# Patient Record
Sex: FEMALE | Race: WHITE | NOT HISPANIC OR LATINO | Employment: UNEMPLOYED | URBAN - NONMETROPOLITAN AREA
[De-identification: names, ages, dates, MRNs, and addresses within clinical notes are randomized per-mention and may not be internally consistent; named-entity substitution may affect disease eponyms.]

---

## 2017-01-10 ENCOUNTER — OFFICE VISIT (OUTPATIENT)
Dept: OBSTETRICS AND GYNECOLOGY | Facility: CLINIC | Age: 27
End: 2017-01-10

## 2017-01-10 VITALS
SYSTOLIC BLOOD PRESSURE: 124 MMHG | HEIGHT: 65 IN | DIASTOLIC BLOOD PRESSURE: 82 MMHG | WEIGHT: 249 LBS | BODY MASS INDEX: 41.48 KG/M2

## 2017-01-10 DIAGNOSIS — N91.1 AMENORRHEA, SECONDARY: Primary | ICD-10-CM

## 2017-01-10 PROCEDURE — 99202 OFFICE O/P NEW SF 15 MIN: CPT | Performed by: OBSTETRICS & GYNECOLOGY

## 2017-01-10 RX ORDER — MEDROXYPROGESTERONE ACETATE 10 MG/1
10 TABLET ORAL DAILY
Qty: 10 TABLET | Refills: 0 | Status: SHIPPED | OUTPATIENT
Start: 2017-01-10 | End: 2017-02-14 | Stop reason: SDUPTHER

## 2017-01-10 NOTE — LETTER
January 10, 2017     Patient: Jerrica Marquez   YOB: 1990   Date of Visit: 1/10/2017       To Whom It May Concern:    It is my medical opinion that Jerrica Marquez may return to work on 1/11/2017.           Sincerely,        Jayne Thibodeaux MD

## 2017-01-10 NOTE — PROGRESS NOTES
"Subjective     Chief Complaint   Patient presents with   • Gynecologic Exam     irregular bleeding       Jerrica Marquez is a 26 y.o. G0 presents today for irregular bleeding and desire for fertility.  Patient states she has always had irregular periods and at times goes several months without one.  She states that she went from May until November without a period, followed by a heavy period in the month of November.  She states she was seen in the ER for bleeding and was started on Provera that stopped her bleeding.  Patient states that she was told \"she might have PCOS.\"  Never been pregnant but has been trying for almost a full year without success.     No history of abnl paps or STDs.   has never fathered a child.      Past Medical History   Diagnosis Date   • Abdominal pain      generalized cramping after eating wheat type products   • Acquired hypothyroidism      Secondary to Hashimoto's thyroiditis   • Dysmenorrhea    • Endogenous obesity    • Excessive or frequent menstruation    • Hashimoto's thyroiditis    • Headache    • Myopia    • Vitamin D deficiency      Past Surgical History   Procedure Laterality Date   • Injection of medication  08/14/2013     Depo Provera (medroxyprogesterone acetate)   • Injection of medication  03/13/2012     Kenalog x2   • Injection of medication  09/17/2015     Toradol   • Injection of medication  09/17/2015     Zofran     Social History     Social History   • Marital status: Single     Spouse name: N/A   • Number of children: N/A   • Years of education: N/A     Occupational History   • Not on file.     Social History Main Topics   • Smoking status: Never Smoker   • Smokeless tobacco: Never Used   • Alcohol use No   • Drug use: No   • Sexual activity: Yes     Partners: Male     Other Topics Concern   • Not on file     Social History Narrative     Family History   Problem Relation Age of Onset   • Asthma Other    • Hypertension Other    • Other Other      Thyroid " "Disorder, Depressive Disorder, Smoking Tobacco, Anxiety   • Rheum arthritis Mother    • Hypertension Father    • Heart disease Maternal Grandfather    • Hypertension Maternal Grandfather        Review of Systems - comprehensive ROS preformed and all negative except for mood swings, cold intolerance, heat intolerance.     Objective   Visit Vitals   • /82 (BP Location: Left arm, Patient Position: Sitting, Cuff Size: Adult)   • Ht 65\" (165.1 cm)   • Wt 249 lb (113 kg)   • BMI 41.44 kg/m2       Physical Exam  General:   Appears stated age, AAOx3, NAD   Heart: RRR no m/r/g   Lungs: CTAB   Abdomen: Soft, nttp, no masses palpated   Extremities: No CT or edema bilaterally    Neurologic: CN II - XII grossly intact     TVUS (12/16):  The uterus is normal in position, shape and size.  The uterus measures 8.67 cm in length by 4.24 cm AP by 5.35 cm  transverse  Endometrium thickened to 18.6 mm.      Both ovaries are visualized and are unremarkable.  Doppler and color Doppler demonstrate flow in each ovary  No free fluid.    Assessment/Plan     ASSESSMENT  1. Amenorrhea, secondary        PLAN  1.  Amenorrhea  - Discussed that this was most likely PCOS   - TVUS results reviewed and discussed with patient  - Encourage patient to follow with endocrine for latest TSH level.   - Discussed that given the desire for fertility will do Day#3 labs following a provera withdrawal bleed  - Encourage patient about weight loss, discussed that even a loss of 10% of current weight can increase fecundity   - RTC in 4 weeks.      Orders Placed This Encounter   Procedures   • Follicle Stimulating Hormone   • Luteinizing Hormone   • Estradiol   • Prolactin           New Medications Ordered This Visit   Medications   • medroxyPROGESTERone (PROVERA) 10 MG tablet     Sig: Take 1 tablet by mouth Daily.     Dispense:  10 tablet     Refill:  0       Jayne BYRNE Friday, MD  1/10/2017           "

## 2017-01-10 NOTE — MR AVS SNAPSHOT
[facility]  [phone]                    Jerrica Marquez   1/10/2017 9:00 AM   Office Visit    Dept Phone:  [phone]   Encounter #:  [id number]    Provider:  Jayne Thibodeaux MD   Department:  [facility]                Your Full Care Plan              Today's Medication Changes          These changes are accurate as of: 1/10/17  9:41 AM.  If you have any questions, ask your nurse or doctor.               Stop taking medication(s)listed here:     amoxicillin 500 MG capsule   Commonly known as:  AMOXIL                Where to Get Your Medications      These medications were sent to [facility]/pharmacy #[id number] - [place], [place] - [address] AT [address]AR [facility] - [phone]  - [phone]   [address], [facility] [address]     Phone:  [phone]     medroxyPROGESTERone 10 MG tablet                  Your Updated Medication List          This list is accurate as of: 1/10/17  9:41 AM.  Always use your most recent med list.                levothyroxine 125 MCG tablet   Commonly known as:  SYNTHROID, LEVOTHROID   Take 1 tablet by mouth daily.       medroxyPROGESTERone 10 MG tablet   Commonly known as:  PROVERA   Take 1 tablet by mouth Daily.       norethindrone-ethinyl estradiol 0.4-35 MG-MCG per tablet   Commonly known as:  OVCON   Take 3 tabs po for three days, then 2 tabs po for 3 days then 1 daily       PRENATAL VITAMIN PO               You Were Diagnosed With        Codes Comments    Amenorrhea, secondary    -  Primary ICD-10-CM: N91.1  ICD-9-CM: 626.0       Instructions     None    Patient Instructions History      Upcoming Appointments     Visit Type Date Time Department    NEW GYNECOLOGICAL 1/10/2017  9:00 AM [facility]    OFFICE VISIT 1/16/2017 10:45 AM [facility] 4TH    FOLLOW UP 2/8/2017  9:00 AM [facility]    OFFICE VISIT 2/14/2017  9:45 AM [facility]      MyChart Signup     Our records indicate that you  "have an active [facility] [employer] account.    You can view your After Visit Summary by going to [employer] and logging in with your [employer] username and password.  If you don't have a [employer] username and password but a parent or guardian has access to your record, the parent or guardian should login with their own [employer] username and password and access your record to view the After Visit Summary.    If you have questions, you can email [email or web address] or call [phone] to talk to our [employer] staff.  Remember, [employer] is NOT to be used for urgent needs.  For medical emergencies, dial 911.               Other Info from Your Visit           Your Appointments     Jan 16, 2017 10:45 AM CST   Office Visit with Leann Duff MD   [facility] (--)    [address]  [address] [facility] [address] [phone]           Arrive 15 minutes prior to appointment.            Feb 08, 2017  9:00 AM CST   Follow Up with REBECA Blancas   [facility] ENDOCRINOLOGY (--)    [address]  [address] [facility] [address] [phone]           Arrive 15 minutes prior to appointment.            Feb 14, 2017  9:45 AM CST   Office Visit with Jayne Thibodeaux MD   [facility] (--)    [address] [place] [address] [address]  [address] [facility] [address] [phone]           Arrive 15 minutes prior to appointment.              Allergies     Cefzil [Cefprozil]  Hives    Celexa [Citalopram]      Patient states that she is not allergic to this med. It causes fatigue      Reason for Visit     Gynecologic Exam irregular bleeding      Vital Signs     Blood Pressure Height Weight Body Mass Index Smoking Status       124/82 (BP Location: Left arm, Patient Position: Sitting, Cuff Size: Adult) 65\" (165.1 cm) 249 lb (113 kg) 41.44 kg/m2 Never Smoker       Problems and Diagnoses Noted  "    Amenorrhea, secondary    -  Primary

## 2017-02-03 ENCOUNTER — LAB (OUTPATIENT)
Dept: LAB | Facility: HOSPITAL | Age: 27
End: 2017-02-03

## 2017-02-03 DIAGNOSIS — N91.1 AMENORRHEA, SECONDARY: ICD-10-CM

## 2017-02-03 LAB
FSH SERPL-ACNC: 3 MIU/ML
LH SERPL-ACNC: 0.73 MIU/ML

## 2017-02-03 PROCEDURE — 83001 ASSAY OF GONADOTROPIN (FSH): CPT | Performed by: OBSTETRICS & GYNECOLOGY

## 2017-02-03 PROCEDURE — 84146 ASSAY OF PROLACTIN: CPT | Performed by: OBSTETRICS & GYNECOLOGY

## 2017-02-03 PROCEDURE — 82670 ASSAY OF TOTAL ESTRADIOL: CPT | Performed by: OBSTETRICS & GYNECOLOGY

## 2017-02-03 PROCEDURE — 36415 COLL VENOUS BLD VENIPUNCTURE: CPT

## 2017-02-03 PROCEDURE — 83002 ASSAY OF GONADOTROPIN (LH): CPT | Performed by: OBSTETRICS & GYNECOLOGY

## 2017-02-04 LAB
ESTRADIOL SERPL HS-MCNC: 25.7 PG/ML
PROLACTIN SERPL-MCNC: 16.2 NG/ML (ref 4.8–23.3)

## 2017-02-14 ENCOUNTER — OFFICE VISIT (OUTPATIENT)
Dept: OBSTETRICS AND GYNECOLOGY | Facility: CLINIC | Age: 27
End: 2017-02-14

## 2017-02-14 VITALS
SYSTOLIC BLOOD PRESSURE: 114 MMHG | BODY MASS INDEX: 41.32 KG/M2 | HEIGHT: 65 IN | DIASTOLIC BLOOD PRESSURE: 77 MMHG | WEIGHT: 248 LBS

## 2017-02-14 DIAGNOSIS — N97.9 INFERTILITY, FEMALE: Primary | ICD-10-CM

## 2017-02-14 PROCEDURE — 99212 OFFICE O/P EST SF 10 MIN: CPT | Performed by: OBSTETRICS & GYNECOLOGY

## 2017-02-14 RX ORDER — MEDROXYPROGESTERONE ACETATE 10 MG/1
10 TABLET ORAL DAILY
Qty: 10 TABLET | Refills: 0 | Status: SHIPPED | OUTPATIENT
Start: 2017-02-14 | End: 2017-05-26

## 2017-02-15 NOTE — PROGRESS NOTES
"Subjective     Chief Complaint   Patient presents with   • Follow-up       Jerrica Marquez is a 26 y.o. G0 presents today for follow-up.  Patient was seen on 1/10 with complaints of infertility, amenorrhea/irregular bleeding.  Patient has been trying for a full year with no success in conceiving.  Had a period on 1/19, states it was light and lasted 5 days.  This period was following a provera withdrawal bleed.      No changes to PMH, family or social history since last visit.  No new medications or allergies. Had wisdom teeth out on 1/18.    Objective   Visit Vitals   • /77   • Ht 65\" (165.1 cm)   • Wt 248 lb (112 kg)   • BMI 41.27 kg/m2       Physical Exam  General:   Appears stated age, AAOx3, NAD   Neurologic: CN II - XII grossly intact       Assessment/Plan     ASSESSMENT  1. Infertility, female        PLAN  1.  Infertility  - Given the diagnosis of anovulatory bleeding/PCOS and desire to conceive discuss the use of Clomid and potential benefits.  Patient interested  - Discussed risk of mood swings, hot flashes, and potential for multiples with the use of Clomid.  Patient verbalized understanding  - Discussed doing withdrawal bleed with Provera, followed by Clomid on Day 5-9, with progesterone level on Day 21.  - Discussed doing only 2 rounds at each dose up to a max dose of 150mg.   - Will have patient follow up after first round to evaluate how patient tolerated  - Asked to scheduled follow up with Endocrine at the end of the month    Orders Placed This Encounter   Procedures   • Progesterone           New Medications Ordered This Visit   Medications   • medroxyPROGESTERone (PROVERA) 10 MG tablet     Sig: Take 1 tablet by mouth Daily.     Dispense:  10 tablet     Refill:  0   • clomiPHENE (CLOMID) 50 MG tablet     Sig: Take 1 tablet by mouth Daily. Please take on Day 5-9 of your cycle     Dispense:  5 tablet     Refill:  1       Jayne Thibodeaux MD  2/14/2017           "

## 2017-02-17 DIAGNOSIS — E06.3 HASHIMOTO'S THYROIDITIS: Primary | ICD-10-CM

## 2017-02-21 ENCOUNTER — OFFICE VISIT (OUTPATIENT)
Dept: ENDOCRINOLOGY | Facility: CLINIC | Age: 27
End: 2017-02-21

## 2017-02-21 ENCOUNTER — LAB (OUTPATIENT)
Dept: LAB | Facility: HOSPITAL | Age: 27
End: 2017-02-21

## 2017-02-21 VITALS — HEART RATE: 99 BPM | HEIGHT: 65 IN | SYSTOLIC BLOOD PRESSURE: 126 MMHG | DIASTOLIC BLOOD PRESSURE: 80 MMHG

## 2017-02-21 DIAGNOSIS — E06.3 HASHIMOTO'S THYROIDITIS: Primary | ICD-10-CM

## 2017-02-21 DIAGNOSIS — E55.9 VITAMIN D DEFICIENCY: ICD-10-CM

## 2017-02-21 DIAGNOSIS — N97.9 INFERTILITY, FEMALE: ICD-10-CM

## 2017-02-21 LAB
ALBUMIN SERPL-MCNC: 4.1 G/DL (ref 3.4–4.8)
ALBUMIN/GLOB SERPL: 1.3 G/DL (ref 1.1–1.8)
ALP SERPL-CCNC: 85 U/L (ref 38–126)
ALT SERPL W P-5'-P-CCNC: 29 U/L (ref 9–52)
ANION GAP SERPL CALCULATED.3IONS-SCNC: 11 MMOL/L (ref 5–15)
AST SERPL-CCNC: 33 U/L (ref 14–36)
BASOPHILS # BLD AUTO: 0.01 10*3/MM3 (ref 0–0.2)
BASOPHILS NFR BLD AUTO: 0.2 % (ref 0–2)
BILIRUB SERPL-MCNC: 0.3 MG/DL (ref 0.2–1.3)
BUN BLD-MCNC: 9 MG/DL (ref 7–21)
BUN/CREAT SERPL: 12 (ref 7–25)
CALCIUM SPEC-SCNC: 9 MG/DL (ref 8.4–10.2)
CHLORIDE SERPL-SCNC: 106 MMOL/L (ref 95–110)
CO2 SERPL-SCNC: 24 MMOL/L (ref 22–31)
CREAT BLD-MCNC: 0.75 MG/DL (ref 0.5–1)
DEPRECATED RDW RBC AUTO: 40.9 FL (ref 36.4–46.3)
EOSINOPHIL # BLD AUTO: 0.09 10*3/MM3 (ref 0–0.7)
EOSINOPHIL NFR BLD AUTO: 1.7 % (ref 0–7)
ERYTHROCYTE [DISTWIDTH] IN BLOOD BY AUTOMATED COUNT: 14.5 % (ref 11.5–14.5)
GFR SERPL CREATININE-BSD FRML MDRD: 94 ML/MIN/1.73 (ref 71–165)
GLOBULIN UR ELPH-MCNC: 3.2 GM/DL (ref 2.3–3.5)
GLUCOSE BLD-MCNC: 134 MG/DL (ref 60–100)
HCT VFR BLD AUTO: 32.9 % (ref 35–45)
HGB BLD-MCNC: 10.5 G/DL (ref 12–15.5)
IMM GRANULOCYTES # BLD: 0.01 10*3/MM3 (ref 0–0.02)
IMM GRANULOCYTES NFR BLD: 0.2 % (ref 0–0.5)
LYMPHOCYTES # BLD AUTO: 1.5 10*3/MM3 (ref 0.6–4.2)
LYMPHOCYTES NFR BLD AUTO: 27.9 % (ref 10–50)
MCH RBC QN AUTO: 24.8 PG (ref 26.5–34)
MCHC RBC AUTO-ENTMCNC: 31.9 G/DL (ref 31.4–36)
MCV RBC AUTO: 77.6 FL (ref 80–98)
MONOCYTES # BLD AUTO: 0.39 10*3/MM3 (ref 0–0.9)
MONOCYTES NFR BLD AUTO: 7.3 % (ref 0–12)
NEUTROPHILS # BLD AUTO: 3.37 10*3/MM3 (ref 2–8.6)
NEUTROPHILS NFR BLD AUTO: 62.7 % (ref 37–80)
PLATELET # BLD AUTO: 329 10*3/MM3 (ref 150–450)
PMV BLD AUTO: 9.5 FL (ref 8–12)
POTASSIUM BLD-SCNC: 4.1 MMOL/L (ref 3.5–5.1)
PROT SERPL-MCNC: 7.3 G/DL (ref 6.3–8.6)
RBC # BLD AUTO: 4.24 10*6/MM3 (ref 3.77–5.16)
SODIUM BLD-SCNC: 141 MMOL/L (ref 137–145)
TSH SERPL DL<=0.05 MIU/L-ACNC: 3.96 MIU/ML (ref 0.46–4.68)
WBC NRBC COR # BLD: 5.37 10*3/MM3 (ref 3.2–9.8)

## 2017-02-21 PROCEDURE — 99213 OFFICE O/P EST LOW 20 MIN: CPT | Performed by: NURSE PRACTITIONER

## 2017-02-21 PROCEDURE — 84443 ASSAY THYROID STIM HORMONE: CPT | Performed by: NURSE PRACTITIONER

## 2017-02-21 PROCEDURE — 80053 COMPREHEN METABOLIC PANEL: CPT | Performed by: NURSE PRACTITIONER

## 2017-02-21 PROCEDURE — 85025 COMPLETE CBC W/AUTO DIFF WBC: CPT | Performed by: NURSE PRACTITIONER

## 2017-02-21 PROCEDURE — 36415 COLL VENOUS BLD VENIPUNCTURE: CPT | Performed by: NURSE PRACTITIONER

## 2017-02-21 PROCEDURE — 84144 ASSAY OF PROGESTERONE: CPT | Performed by: OBSTETRICS & GYNECOLOGY

## 2017-02-21 RX ORDER — LEVOTHYROXINE SODIUM 125 MCG
125 TABLET ORAL DAILY
Qty: 30 TABLET | Refills: 11 | Status: SHIPPED | OUTPATIENT
Start: 2017-02-21 | End: 2017-05-26

## 2017-02-21 NOTE — PROGRESS NOTES
Subjective    Jerrica Marquez is a 26 y.o. female. she is here today for follow-up.    History of Present Illness         26 year old comes for follow up      reason - hypothyroidism,due to Hashimoto's     timing - constant     quality - not controlled     severity - moderate     symptoms - 60 + lb weight gain, fatigue, hair loss, constipation, arthralgias     alleviating - levothyroxine 100 mcgs daily and on this dose TSH is 5    Currently trying to become pregnant     --     obesity   despite exercise  following 1800 calorie diet     --     h o vit D Deficiency, not on supplements       Evaluation history:  TSH   Date Value Ref Range Status   12/16/2016 6.41 (H) 0.46 - 4.68 uIU/ml Final     FREE T4   Date Value Ref Range Status   12/16/2016 0.84 0.78 - 2.19 ng/dl Final       Current medications:  Current Outpatient Prescriptions   Medication Sig Dispense Refill   • clomiPHENE (CLOMID) 50 MG tablet Take 1 tablet by mouth Daily. Please take on Day 5-9 of your cycle 5 tablet 1   • levothyroxine (SYNTHROID, LEVOTHROID) 125 MCG tablet Take 1 tablet by mouth daily. 30 tablet 11   • medroxyPROGESTERone (PROVERA) 10 MG tablet Take 1 tablet by mouth Daily. 10 tablet 0   • Prenatal Vit-Fe Fumarate-FA (PRENATAL VITAMIN PO) Take  by mouth Daily.       No current facility-administered medications for this visit.        The following portions of the patient's history were reviewed and updated as appropriate:   Past Medical History   Diagnosis Date   • Abdominal pain      generalized cramping after eating wheat type products   • Acquired hypothyroidism      Secondary to Hashimoto's thyroiditis   • Dysmenorrhea    • Endogenous obesity    • Excessive or frequent menstruation    • Hashimoto's thyroiditis    • Headache    • Myopia    • Vitamin D deficiency      Past Surgical History   Procedure Laterality Date   • Injection of medication  08/14/2013     Depo Provera (medroxyprogesterone acetate)   • Injection of medication   03/13/2012     Kenalog x2   • Injection of medication  09/17/2015     Toradol   • Injection of medication  09/17/2015     Zofran     Family History   Problem Relation Age of Onset   • Asthma Other    • Hypertension Other    • Other Other      Thyroid Disorder, Depressive Disorder, Smoking Tobacco, Anxiety   • Rheum arthritis Mother    • Hypertension Father    • Heart disease Maternal Grandfather    • Hypertension Maternal Grandfather      OB History     No data available        Allergies   Allergen Reactions   • Cefzil [Cefprozil] Hives   • Celexa [Citalopram]      Patient states that she is not allergic to this med. It causes fatigue     Social History     Social History   • Marital status: Single     Spouse name: N/A   • Number of children: N/A   • Years of education: N/A     Social History Main Topics   • Smoking status: Never Smoker   • Smokeless tobacco: Never Used   • Alcohol use No   • Drug use: No   • Sexual activity: Yes     Partners: Male     Other Topics Concern   • None     Social History Narrative       Review of Systems  Review of Systems   Constitutional: Negative for activity change, appetite change, chills, diaphoresis and fatigue.   HENT: Negative for congestion, dental problem, drooling, ear discharge, ear pain, facial swelling, sneezing, sore throat, tinnitus, trouble swallowing and voice change.    Eyes: Negative for photophobia, pain, discharge, redness, itching and visual disturbance.   Respiratory: Negative for apnea, cough, choking, chest tightness and shortness of breath.    Cardiovascular: Negative for chest pain, palpitations and leg swelling.   Gastrointestinal: Negative for abdominal distention, abdominal pain, constipation, diarrhea, nausea and vomiting.   Endocrine: Negative for cold intolerance, heat intolerance, polydipsia, polyphagia and polyuria.   Genitourinary: Negative for difficulty urinating, dysuria, frequency, hematuria and urgency.   Musculoskeletal: Negative for  "arthralgias, back pain, gait problem, joint swelling, myalgias, neck pain and neck stiffness.   Skin: Negative for color change, pallor, rash and wound.   Allergic/Immunologic: Negative for environmental allergies, food allergies and immunocompromised state.   Neurological: Negative for dizziness, tremors, facial asymmetry, weakness, light-headedness, numbness and headaches.   Hematological: Negative for adenopathy. Does not bruise/bleed easily.   Psychiatric/Behavioral: Negative for agitation, behavioral problems, confusion, decreased concentration, dysphoric mood and sleep disturbance.        Objective      Visit Vitals   • /80 (BP Location: Right arm, Patient Position: Sitting, Cuff Size: Adult)   • Pulse 99   • Ht 65\" (165.1 cm)     Physical Exam   Constitutional: She is oriented to person, place, and time. She appears well-developed and well-nourished. No distress.   HENT:   Head: Normocephalic and atraumatic.   Right Ear: External ear normal.   Left Ear: External ear normal.   Nose: Nose normal.   Eyes: Conjunctivae and EOM are normal. Pupils are equal, round, and reactive to light.   Neck: Normal range of motion. Neck supple. No tracheal deviation present. No thyromegaly present.   Cardiovascular: Normal rate, regular rhythm and normal heart sounds.    No murmur heard.  Pulmonary/Chest: Effort normal and breath sounds normal. No respiratory distress. She has no wheezes.   Abdominal: Soft. Bowel sounds are normal. There is no tenderness. There is no rebound and no guarding.   Musculoskeletal: Normal range of motion. She exhibits no edema, tenderness or deformity.   Neurological: She is alert and oriented to person, place, and time. No cranial nerve deficit.   Skin: Skin is warm and dry. No rash noted.   Psychiatric: She has a normal mood and affect. Her behavior is normal. Judgment and thought content normal.       Lab Review  Lab Results   Component Value Date    TSH 6.41 (H) 12/16/2016     Lab Results "   Component Value Date    FREET4 0.84 12/16/2016        Assessment/Plan      1. Hashimoto's thyroiditis    2. Vitamin D deficiency    . This diagnosis was discussed and reviewed with the patient including the advantages of drug therapy.     1. Orders placed during this encounter include:  No orders of the defined types were placed in this encounter.      Medications prescribed:  Outpatient Encounter Prescriptions as of 2/21/2017   Medication Sig Dispense Refill   • clomiPHENE (CLOMID) 50 MG tablet Take 1 tablet by mouth Daily. Please take on Day 5-9 of your cycle 5 tablet 1   • levothyroxine (SYNTHROID, LEVOTHROID) 125 MCG tablet Take 1 tablet by mouth daily. 30 tablet 11   • medroxyPROGESTERone (PROVERA) 10 MG tablet Take 1 tablet by mouth Daily. 10 tablet 0   • Prenatal Vit-Fe Fumarate-FA (PRENATAL VITAMIN PO) Take  by mouth Daily.       No facility-administered encounter medications on file as of 2/21/2017.            Plan Details  Hypothyroidism        on Levothyroxine 125 mcgs one daily     June 2015     TSH - 2.07  no change in dosage     ( previouly on levothyroxine and cytomel did not like the cytomel)     repeat labs today     Will call with results       Variability TSH -- submit for brand name synthroid     When she becomes pregnant she is to call the office immediately      ===     belviq 10 mg may be a possibility but too expensive - not taking --     ===                 ====     Vit D Def , start 50 th q 2 w     Was taking     She ran out about one month ago      ====     B12 nl      --------------     stomach pain after eating breads and other foods     will check for celiac --negative                        4. Return in about 6 months (around 8/21/2017) for Recheck.

## 2017-02-22 LAB — PROGEST SERPL-MCNC: <0.1 NG/ML

## 2017-02-23 DIAGNOSIS — N97.9 FEMALE INFERTILITY: Primary | ICD-10-CM

## 2017-03-17 ENCOUNTER — LAB (OUTPATIENT)
Dept: LAB | Facility: HOSPITAL | Age: 27
End: 2017-03-17

## 2017-03-17 DIAGNOSIS — N97.9 FEMALE INFERTILITY: ICD-10-CM

## 2017-03-17 PROCEDURE — 36415 COLL VENOUS BLD VENIPUNCTURE: CPT

## 2017-03-17 PROCEDURE — 84144 ASSAY OF PROGESTERONE: CPT | Performed by: OBSTETRICS & GYNECOLOGY

## 2017-03-18 LAB — PROGEST SERPL-MCNC: 6.3 NG/ML

## 2017-03-21 ENCOUNTER — OFFICE VISIT (OUTPATIENT)
Dept: OBSTETRICS AND GYNECOLOGY | Facility: CLINIC | Age: 27
End: 2017-03-21

## 2017-03-21 VITALS
DIASTOLIC BLOOD PRESSURE: 76 MMHG | BODY MASS INDEX: 41.65 KG/M2 | WEIGHT: 250 LBS | HEIGHT: 65 IN | SYSTOLIC BLOOD PRESSURE: 127 MMHG

## 2017-03-21 DIAGNOSIS — N97.9 INFERTILITY, FEMALE: Primary | ICD-10-CM

## 2017-03-21 PROCEDURE — 99212 OFFICE O/P EST SF 10 MIN: CPT | Performed by: OBSTETRICS & GYNECOLOGY

## 2017-03-21 NOTE — PROGRESS NOTES
"Subjective     Chief Complaint   Patient presents with   • Follow-up       Jerrica Marquez is a 26 y.o. presents today for follow up after first trial of Clomid.  States she felt a little emotional during the first cycle, however, it okay.  No bleeding since.  No nausea or breast tenderness.  States they did to time intercourse.  No pelvic pain or discharge.     No changes to PMH, PSH, family or social history since last visit.  No new medications or allergies.       Objective   Visit Vitals   • /76   • Ht 65\" (165.1 cm)   • Wt 250 lb (113 kg)   • BMI 41.6 kg/m2       Physical Exam  General:   Appears stated age, AAOx3, NAD   Neurologic: CN II - XII grossly intact       Assessment/Plan     ASSESSMENT  1. Infertility, female        PLAN  1. Infertility, female  - First cycle of Clomid successful, tolerated with minimal side effects, did timed intercourse  - Will take UPT in 4 weeks, if not pregnant, will do second cycle  - RTC in 4 weeks.      Jayne Thibodeaux MD  3/21/2017           "

## 2017-04-18 ENCOUNTER — APPOINTMENT (OUTPATIENT)
Dept: LAB | Facility: HOSPITAL | Age: 27
End: 2017-04-18

## 2017-04-18 DIAGNOSIS — N97.9 INFERTILITY, FEMALE: Primary | ICD-10-CM

## 2017-04-18 PROCEDURE — 84144 ASSAY OF PROGESTERONE: CPT | Performed by: OBSTETRICS & GYNECOLOGY

## 2017-04-19 LAB — PROGEST SERPL-MCNC: 1 NG/ML

## 2017-05-23 ENCOUNTER — TELEPHONE (OUTPATIENT)
Dept: OBSTETRICS AND GYNECOLOGY | Facility: CLINIC | Age: 27
End: 2017-05-23

## 2017-05-23 ENCOUNTER — LAB (OUTPATIENT)
Dept: LAB | Facility: HOSPITAL | Age: 27
End: 2017-05-23

## 2017-05-23 DIAGNOSIS — Z32.01 POSITIVE URINE PREGNANCY TEST: Primary | ICD-10-CM

## 2017-05-23 DIAGNOSIS — Z32.01 POSITIVE URINE PREGNANCY TEST: ICD-10-CM

## 2017-05-23 LAB — HCG INTACT+B SERPL-ACNC: ABNORMAL MIU/ML

## 2017-05-23 PROCEDURE — 36415 COLL VENOUS BLD VENIPUNCTURE: CPT

## 2017-05-23 PROCEDURE — 84702 CHORIONIC GONADOTROPIN TEST: CPT | Performed by: OBSTETRICS & GYNECOLOGY

## 2017-05-26 ENCOUNTER — INITIAL PRENATAL (OUTPATIENT)
Dept: OBSTETRICS AND GYNECOLOGY | Facility: CLINIC | Age: 27
End: 2017-05-26

## 2017-05-26 ENCOUNTER — APPOINTMENT (OUTPATIENT)
Dept: LAB | Facility: HOSPITAL | Age: 27
End: 2017-05-26

## 2017-05-26 VITALS — DIASTOLIC BLOOD PRESSURE: 71 MMHG | WEIGHT: 249 LBS | BODY MASS INDEX: 41.44 KG/M2 | SYSTOLIC BLOOD PRESSURE: 129 MMHG

## 2017-05-26 DIAGNOSIS — Z3A.01 7 WEEKS GESTATION OF PREGNANCY: ICD-10-CM

## 2017-05-26 DIAGNOSIS — E03.9 HYPOTHYROIDISM IN PREGNANCY, ANTEPARTUM, FIRST TRIMESTER: Primary | ICD-10-CM

## 2017-05-26 DIAGNOSIS — O99.281 HYPOTHYROIDISM IN PREGNANCY, ANTEPARTUM, FIRST TRIMESTER: Primary | ICD-10-CM

## 2017-05-26 LAB
ABO GROUP BLD: NORMAL
BASOPHILS # BLD AUTO: 0.01 10*3/MM3 (ref 0–0.2)
BASOPHILS NFR BLD AUTO: 0.1 % (ref 0–2)
BLD GP AB SCN SERPL QL: NEGATIVE
DEPRECATED RDW RBC AUTO: 45.5 FL (ref 36.4–46.3)
EOSINOPHIL # BLD AUTO: 0.05 10*3/MM3 (ref 0–0.7)
EOSINOPHIL NFR BLD AUTO: 0.5 % (ref 0–7)
ERYTHROCYTE [DISTWIDTH] IN BLOOD BY AUTOMATED COUNT: 16.6 % (ref 11.5–14.5)
HBV SURFACE AG SERPL QL IA: NEGATIVE
HCT VFR BLD AUTO: 34.5 % (ref 35–45)
HCV AB SER DONR QL: NEGATIVE
HGB BLD-MCNC: 11.4 G/DL (ref 12–15.5)
HIV1+2 AB SER QL: NEGATIVE
IMM GRANULOCYTES # BLD: 0.02 10*3/MM3 (ref 0–0.02)
IMM GRANULOCYTES NFR BLD: 0.2 % (ref 0–0.5)
LYMPHOCYTES # BLD AUTO: 1.88 10*3/MM3 (ref 0.6–4.2)
LYMPHOCYTES NFR BLD AUTO: 19 % (ref 10–50)
Lab: NORMAL
MCH RBC QN AUTO: 24.9 PG (ref 26.5–34)
MCHC RBC AUTO-ENTMCNC: 33 G/DL (ref 31.4–36)
MCV RBC AUTO: 75.5 FL (ref 80–98)
MONOCYTES # BLD AUTO: 0.64 10*3/MM3 (ref 0–0.9)
MONOCYTES NFR BLD AUTO: 6.5 % (ref 0–12)
NEUTROPHILS # BLD AUTO: 7.29 10*3/MM3 (ref 2–8.6)
NEUTROPHILS NFR BLD AUTO: 73.7 % (ref 37–80)
PLATELET # BLD AUTO: 320 10*3/MM3 (ref 150–450)
PMV BLD AUTO: 9.8 FL (ref 8–12)
RBC # BLD AUTO: 4.57 10*6/MM3 (ref 3.77–5.16)
RH BLD: POSITIVE
RUBV IGG SER QL: ABNORMAL
RUBV IGG SER-ACNC: 45.2 IU/ML (ref 0–9.9)
WBC NRBC COR # BLD: 9.89 10*3/MM3 (ref 3.2–9.8)

## 2017-05-26 PROCEDURE — 86901 BLOOD TYPING SEROLOGIC RH(D): CPT | Performed by: OBSTETRICS & GYNECOLOGY

## 2017-05-26 PROCEDURE — 87491 CHLMYD TRACH DNA AMP PROBE: CPT | Performed by: OBSTETRICS & GYNECOLOGY

## 2017-05-26 PROCEDURE — 87340 HEPATITIS B SURFACE AG IA: CPT | Performed by: OBSTETRICS & GYNECOLOGY

## 2017-05-26 PROCEDURE — 36415 COLL VENOUS BLD VENIPUNCTURE: CPT | Performed by: OBSTETRICS & GYNECOLOGY

## 2017-05-26 PROCEDURE — 86850 RBC ANTIBODY SCREEN: CPT | Performed by: OBSTETRICS & GYNECOLOGY

## 2017-05-26 PROCEDURE — 86592 SYPHILIS TEST NON-TREP QUAL: CPT | Performed by: OBSTETRICS & GYNECOLOGY

## 2017-05-26 PROCEDURE — 0502F SUBSEQUENT PRENATAL CARE: CPT | Performed by: OBSTETRICS & GYNECOLOGY

## 2017-05-26 PROCEDURE — 86900 BLOOD TYPING SEROLOGIC ABO: CPT | Performed by: OBSTETRICS & GYNECOLOGY

## 2017-05-26 PROCEDURE — G0432 EIA HIV-1/HIV-2 SCREEN: HCPCS | Performed by: OBSTETRICS & GYNECOLOGY

## 2017-05-26 PROCEDURE — 85025 COMPLETE CBC W/AUTO DIFF WBC: CPT | Performed by: OBSTETRICS & GYNECOLOGY

## 2017-05-26 PROCEDURE — 86803 HEPATITIS C AB TEST: CPT | Performed by: OBSTETRICS & GYNECOLOGY

## 2017-05-26 PROCEDURE — 87086 URINE CULTURE/COLONY COUNT: CPT | Performed by: OBSTETRICS & GYNECOLOGY

## 2017-05-26 PROCEDURE — 87591 N.GONORRHOEAE DNA AMP PROB: CPT | Performed by: OBSTETRICS & GYNECOLOGY

## 2017-05-26 RX ORDER — PROMETHAZINE HYDROCHLORIDE 12.5 MG/1
12.5 TABLET ORAL EVERY 6 HOURS PRN
Qty: 30 TABLET | Refills: 2 | Status: SHIPPED | OUTPATIENT
Start: 2017-05-26 | End: 2018-01-08

## 2017-05-27 LAB — BACTERIA SPEC AEROBE CULT: NORMAL

## 2017-05-28 LAB
C TRACH RRNA CVX QL NAA+PROBE: NOT DETECTED
N GONORRHOEA RRNA SPEC QL NAA+PROBE: NOT DETECTED

## 2017-05-31 LAB — RPR SER QL: NORMAL

## 2017-06-05 ENCOUNTER — TELEPHONE (OUTPATIENT)
Dept: ENDOCRINOLOGY | Facility: CLINIC | Age: 27
End: 2017-06-05

## 2017-06-05 DIAGNOSIS — E06.3 HASHIMOTO'S THYROIDITIS: Primary | ICD-10-CM

## 2017-06-05 NOTE — TELEPHONE ENCOUNTER
----- Message from REBECA Blancas sent at 6/5/2017 10:11 AM CDT -----  She needs to go to the lab and have a TSH drawn  ----- Message -----     From: Ana Cristina Frank     Sent: 6/5/2017   8:48 AM       To: REBECA Blancas    PT WANTED TO LET YOU KNOW SHE IS ABOUT 9 WEEKS PREGNANT.  [phone]

## 2017-06-06 ENCOUNTER — APPOINTMENT (OUTPATIENT)
Dept: LAB | Facility: HOSPITAL | Age: 27
End: 2017-06-06

## 2017-06-06 LAB — TSH SERPL DL<=0.05 MIU/L-ACNC: 2.63 MIU/ML (ref 0.46–4.68)

## 2017-06-06 PROCEDURE — 36415 COLL VENOUS BLD VENIPUNCTURE: CPT | Performed by: NURSE PRACTITIONER

## 2017-06-06 PROCEDURE — 84443 ASSAY THYROID STIM HORMONE: CPT | Performed by: NURSE PRACTITIONER

## 2017-06-07 ENCOUNTER — TELEPHONE (OUTPATIENT)
Dept: ENDOCRINOLOGY | Facility: CLINIC | Age: 27
End: 2017-06-07

## 2017-06-07 NOTE — TELEPHONE ENCOUNTER
----- Message from REBECA Blancas sent at 6/7/2017  8:15 AM CDT -----  Call patient with result--let her know TSH is 2.6 needs to be 2.5 or less during 1st trimester pregnancy -- keep current dosage but take and extra 1/2 a pill one day a week all other days take 1

## 2017-06-23 ENCOUNTER — ROUTINE PRENATAL (OUTPATIENT)
Dept: OBSTETRICS AND GYNECOLOGY | Facility: CLINIC | Age: 27
End: 2017-06-23

## 2017-06-23 VITALS — WEIGHT: 249 LBS | BODY MASS INDEX: 41.44 KG/M2 | SYSTOLIC BLOOD PRESSURE: 144 MMHG | DIASTOLIC BLOOD PRESSURE: 87 MMHG

## 2017-06-23 DIAGNOSIS — Z3A.11 11 WEEKS GESTATION OF PREGNANCY: ICD-10-CM

## 2017-06-23 DIAGNOSIS — E03.9 HYPOTHYROIDISM AFFECTING PREGNANCY IN FIRST TRIMESTER: Primary | ICD-10-CM

## 2017-06-23 DIAGNOSIS — O99.281 HYPOTHYROIDISM AFFECTING PREGNANCY IN FIRST TRIMESTER: Primary | ICD-10-CM

## 2017-06-23 PROCEDURE — 99212 OFFICE O/P EST SF 10 MIN: CPT | Performed by: OBSTETRICS & GYNECOLOGY

## 2017-06-23 NOTE — PROGRESS NOTES
CC:  visit    HPI:  Still having some morning sickness, improved with phenergan.  Feels like she is able to keep food down.  No vaginal bleeding or cramping.  Sees endocrine in August     PE - see vital  BSS - active fetus with +cardiac activity     A/P: 25 yo  @ 11w3d by 7w sono presents for  visit.   1. Continue routine prenatal care  - Encouraged PNV  - SAB precautions  - O+, RI, HIV neg, RPR NR, HbsAg neg, GC/CT neg/neg  - Will discuss genetic testing in 2nd trimester  - RTC in 4 weeks    2. Hypothyroidism  - TSH 2.6 on   - Currently on 125mcg daily with one extra pill a week  - Will repeat in 4 weeks given change in medications    Jayne BYRNE Friday

## 2017-07-21 ENCOUNTER — APPOINTMENT (OUTPATIENT)
Dept: LAB | Facility: HOSPITAL | Age: 27
End: 2017-07-21

## 2017-07-21 ENCOUNTER — ROUTINE PRENATAL (OUTPATIENT)
Dept: OBSTETRICS AND GYNECOLOGY | Facility: CLINIC | Age: 27
End: 2017-07-21

## 2017-07-21 VITALS — DIASTOLIC BLOOD PRESSURE: 85 MMHG | BODY MASS INDEX: 41.44 KG/M2 | WEIGHT: 249 LBS | SYSTOLIC BLOOD PRESSURE: 136 MMHG

## 2017-07-21 DIAGNOSIS — Z3A.15 15 WEEKS GESTATION OF PREGNANCY: ICD-10-CM

## 2017-07-21 DIAGNOSIS — E03.9 HYPOTHYROIDISM AFFECTING PREGNANCY IN SECOND TRIMESTER: Primary | ICD-10-CM

## 2017-07-21 DIAGNOSIS — O99.282 HYPOTHYROIDISM AFFECTING PREGNANCY IN SECOND TRIMESTER: Primary | ICD-10-CM

## 2017-07-21 DIAGNOSIS — Z13.89 ENCOUNTER FOR ROUTINE SCREENING FOR MALFORMATION USING ULTRASONICS: ICD-10-CM

## 2017-07-21 LAB
T4 FREE SERPL-MCNC: 1.01 NG/DL (ref 0.78–2.19)
TSH SERPL DL<=0.05 MIU/L-ACNC: 3.37 MIU/ML (ref 0.46–4.68)

## 2017-07-21 PROCEDURE — 84702 CHORIONIC GONADOTROPIN TEST: CPT | Performed by: OBSTETRICS & GYNECOLOGY

## 2017-07-21 PROCEDURE — 99212 OFFICE O/P EST SF 10 MIN: CPT | Performed by: OBSTETRICS & GYNECOLOGY

## 2017-07-21 PROCEDURE — 36415 COLL VENOUS BLD VENIPUNCTURE: CPT | Performed by: OBSTETRICS & GYNECOLOGY

## 2017-07-21 PROCEDURE — 82105 ALPHA-FETOPROTEIN SERUM: CPT | Performed by: OBSTETRICS & GYNECOLOGY

## 2017-07-21 PROCEDURE — 84439 ASSAY OF FREE THYROXINE: CPT | Performed by: OBSTETRICS & GYNECOLOGY

## 2017-07-21 PROCEDURE — 86336 INHIBIN A: CPT | Performed by: OBSTETRICS & GYNECOLOGY

## 2017-07-21 PROCEDURE — 84443 ASSAY THYROID STIM HORMONE: CPT | Performed by: OBSTETRICS & GYNECOLOGY

## 2017-07-21 PROCEDURE — 82677 ASSAY OF ESTRIOL: CPT | Performed by: OBSTETRICS & GYNECOLOGY

## 2017-07-23 NOTE — PROGRESS NOTES
CC:  visit    HPI:  Doing well, had some cramping on the left side, but not consistent.  No LOF or vb.  Hasn't felt movement yet.  Nausea has significantly improved     PE - see vitals    A/P: 27 yo  @ 15w3d by 7w orquidea presents for  visit.   1. Continue routine prenatal care  - Encouraged PNV  - PTL precautions  - O+, RI, HIV neg, RPR NR, HbsAg neg, GC/CT neg/neg  - Quad screen today   - Discussed supportive measures for cramping during pregnancy; rest, tylenol, warm baths.    - RTC in 3-4 weeks, will do anatomy at that time.      2. Hypothyroidism  - TSH 2.6 on   - Currently on 125mcg daily with one extra pill a week  - Repeat TSH/FT4 today      Jayne P. Friday

## 2017-07-25 LAB
2ND TRIMESTER 4 SCREEN SERPL-IMP: NORMAL
AFP ADJ MOM SERPL: 0.99
AFP INTERP SERPL-IMP: NORMAL
AFP SERPL-MCNC: 17.2 NG/ML
AGE AT DELIVERY: 26.5 YEARS
FET TS 18 RISK FROM MAT AGE: NORMAL
FET TS 21 RISK FROM MAT AGE: 954
GA METHOD: NORMAL
GA: 15.3 WEEKS
HCG ADJ MOM SERPL: 1.97
HCG SERPL-ACNC: NORMAL MIU/ML
IDDM PATIENT QL: YES
INHIBIN A ADJ MOM SERPL: 1.95
INHIBIN A SERPL-MCNC: 279.21 PG/ML
LABORATORY COMMENT REPORT: NORMAL
MULTIPLE PREGNANCY: NO
NEURAL TUBE DEFECT RISK FETUS: 3864 %
RESULT: NORMAL
TS 18 RISK FETUS: NORMAL
TS 21 RISK FETUS: 1018
U ESTRIOL ADJ MOM SERPL: 1.39
U ESTRIOL SERPL-MCNC: 0.8 NG/ML

## 2017-08-04 RX ORDER — LEVOTHYROXINE SODIUM 0.12 MG/1
TABLET ORAL
Qty: 40 TABLET | Refills: 11 | Status: SHIPPED | OUTPATIENT
Start: 2017-08-04 | End: 2018-08-07 | Stop reason: DRUGHIGH

## 2017-08-09 ENCOUNTER — ROUTINE PRENATAL (OUTPATIENT)
Dept: OBSTETRICS AND GYNECOLOGY | Facility: CLINIC | Age: 27
End: 2017-08-09

## 2017-08-09 ENCOUNTER — APPOINTMENT (OUTPATIENT)
Dept: LAB | Facility: HOSPITAL | Age: 27
End: 2017-08-09

## 2017-08-09 VITALS — DIASTOLIC BLOOD PRESSURE: 82 MMHG | BODY MASS INDEX: 41.6 KG/M2 | WEIGHT: 250 LBS | SYSTOLIC BLOOD PRESSURE: 138 MMHG

## 2017-08-09 DIAGNOSIS — Z3A.18 18 WEEKS GESTATION OF PREGNANCY: ICD-10-CM

## 2017-08-09 DIAGNOSIS — O99.282 HYPOTHYROIDISM AFFECTING PREGNANCY IN SECOND TRIMESTER: Primary | ICD-10-CM

## 2017-08-09 DIAGNOSIS — E03.9 HYPOTHYROIDISM AFFECTING PREGNANCY IN SECOND TRIMESTER: Primary | ICD-10-CM

## 2017-08-09 DIAGNOSIS — R82.90 CLOUDY URINE: ICD-10-CM

## 2017-08-09 DIAGNOSIS — O35.BXX0 ECHOGENIC FOCUS OF HEART OF FETUS AFFECTING ANTEPARTUM CARE OF MOTHER, NOT APPLICABLE OR UNSPECIFIED FETUS: ICD-10-CM

## 2017-08-09 LAB
AMPHET+METHAMPHET UR QL: NEGATIVE
BARBITURATES UR QL SCN: NEGATIVE
BENZODIAZ UR QL SCN: NEGATIVE
BILIRUB UR QL STRIP: NEGATIVE
CANNABINOIDS SERPL QL: NEGATIVE
CLARITY UR: CLEAR
COCAINE UR QL: NEGATIVE
COLOR UR: YELLOW
GLUCOSE UR STRIP-MCNC: NEGATIVE MG/DL
HGB UR QL STRIP.AUTO: NEGATIVE
KETONES UR QL STRIP: NEGATIVE
LEUKOCYTE ESTERASE UR QL STRIP.AUTO: NEGATIVE
METHADONE UR QL SCN: NEGATIVE
NITRITE UR QL STRIP: NEGATIVE
OPIATES UR QL: NEGATIVE
OXYCODONE UR QL SCN: NEGATIVE
PH UR STRIP.AUTO: 7 [PH] (ref 5–9)
PROT UR QL STRIP: NEGATIVE
SP GR UR STRIP: 1.01 (ref 1–1.03)
UROBILINOGEN UR QL STRIP: NORMAL

## 2017-08-09 PROCEDURE — 80307 DRUG TEST PRSMV CHEM ANLYZR: CPT | Performed by: OBSTETRICS & GYNECOLOGY

## 2017-08-09 PROCEDURE — 99212 OFFICE O/P EST SF 10 MIN: CPT | Performed by: NURSE PRACTITIONER

## 2017-08-09 PROCEDURE — 81003 URINALYSIS AUTO W/O SCOPE: CPT | Performed by: OBSTETRICS & GYNECOLOGY

## 2017-08-09 NOTE — PROGRESS NOTES
. Hx and anatomy scan reviewed. Pt denies N/V/D/C/vaginal bleeding. Patient currently taking 125mcg daily and increased to 2 extra pills a week per Dr. Friday secondary to hypothyroidism in pregnancy. Pt needs f/u u/s at 32 weeks for echogenic focus of left ventricle. RTC in 4 weeks with ruby juarez.

## 2017-08-22 ENCOUNTER — LAB (OUTPATIENT)
Dept: LAB | Facility: HOSPITAL | Age: 27
End: 2017-08-22

## 2017-08-22 ENCOUNTER — OFFICE VISIT (OUTPATIENT)
Dept: ENDOCRINOLOGY | Facility: CLINIC | Age: 27
End: 2017-08-22

## 2017-08-22 VITALS
HEIGHT: 65 IN | HEART RATE: 93 BPM | WEIGHT: 249 LBS | SYSTOLIC BLOOD PRESSURE: 138 MMHG | DIASTOLIC BLOOD PRESSURE: 70 MMHG | BODY MASS INDEX: 41.48 KG/M2

## 2017-08-22 DIAGNOSIS — E06.3 HASHIMOTO'S THYROIDITIS: ICD-10-CM

## 2017-08-22 DIAGNOSIS — O99.282 HYPOTHYROIDISM AFFECTING PREGNANCY IN SECOND TRIMESTER: ICD-10-CM

## 2017-08-22 DIAGNOSIS — E06.3 HASHIMOTO'S THYROIDITIS: Primary | ICD-10-CM

## 2017-08-22 DIAGNOSIS — E03.9 HYPOTHYROIDISM AFFECTING PREGNANCY IN SECOND TRIMESTER: ICD-10-CM

## 2017-08-22 LAB — TSH SERPL DL<=0.05 MIU/L-ACNC: 0.54 MIU/ML (ref 0.46–4.68)

## 2017-08-22 PROCEDURE — 36415 COLL VENOUS BLD VENIPUNCTURE: CPT | Performed by: NURSE PRACTITIONER

## 2017-08-22 PROCEDURE — 84443 ASSAY THYROID STIM HORMONE: CPT | Performed by: NURSE PRACTITIONER

## 2017-08-22 PROCEDURE — 99213 OFFICE O/P EST LOW 20 MIN: CPT | Performed by: NURSE PRACTITIONER

## 2017-08-23 ENCOUNTER — TELEPHONE (OUTPATIENT)
Dept: ENDOCRINOLOGY | Facility: CLINIC | Age: 27
End: 2017-08-23

## 2017-08-23 NOTE — TELEPHONE ENCOUNTER
----- Message from REBECA Blancas sent at 8/23/2017  7:57 AM CDT -----  Call patient with result--TSH is 0.54 so at goal keep current regimen of thyroid medication

## 2017-09-06 ENCOUNTER — ROUTINE PRENATAL (OUTPATIENT)
Dept: OBSTETRICS AND GYNECOLOGY | Facility: CLINIC | Age: 27
End: 2017-09-06

## 2017-09-06 VITALS — DIASTOLIC BLOOD PRESSURE: 78 MMHG | WEIGHT: 251 LBS | BODY MASS INDEX: 41.77 KG/M2 | SYSTOLIC BLOOD PRESSURE: 136 MMHG

## 2017-09-06 DIAGNOSIS — Z3A.22 22 WEEKS GESTATION OF PREGNANCY: ICD-10-CM

## 2017-09-06 DIAGNOSIS — O99.282 HYPOTHYROIDISM AFFECTING PREGNANCY IN SECOND TRIMESTER: ICD-10-CM

## 2017-09-06 DIAGNOSIS — E03.9 HYPOTHYROIDISM AFFECTING PREGNANCY IN SECOND TRIMESTER: ICD-10-CM

## 2017-09-06 DIAGNOSIS — Z36.9 ANTENATAL SCREENING ENCOUNTER: Primary | ICD-10-CM

## 2017-09-06 PROCEDURE — 99212 OFFICE O/P EST SF 10 MIN: CPT | Performed by: ADVANCED PRACTICE MIDWIFE

## 2017-09-06 NOTE — PROGRESS NOTES
CC:  visit, history reviewed see history tabs. Patient stated her PCP will collect TSH.     ROS: Negative leaking fluid from the vagina, swelling in her legs, headache, visual changes, low back pain and heartburn      Educated on:1 hour glucose test.     A/Plan: f/u in 4 week/s   Jerrica was seen today for routine prenatal visit.    Diagnoses and all orders for this visit:     screening encounter  -     CBC & Differential; Future  -     Glucose, Post 50 Gm Glucola; Future    Hypothyroidism affecting pregnancy in second trimester    22 weeks gestation of pregnancy

## 2017-09-20 ENCOUNTER — LAB (OUTPATIENT)
Dept: LAB | Facility: HOSPITAL | Age: 27
End: 2017-09-20

## 2017-09-20 DIAGNOSIS — E06.3 HASHIMOTO'S THYROIDITIS: ICD-10-CM

## 2017-09-20 LAB — TSH SERPL DL<=0.05 MIU/L-ACNC: 0.42 MIU/ML (ref 0.46–4.68)

## 2017-09-20 PROCEDURE — 36415 COLL VENOUS BLD VENIPUNCTURE: CPT

## 2017-09-20 PROCEDURE — 84443 ASSAY THYROID STIM HORMONE: CPT | Performed by: NURSE PRACTITIONER

## 2017-09-21 ENCOUNTER — TELEPHONE (OUTPATIENT)
Dept: ENDOCRINOLOGY | Facility: CLINIC | Age: 27
End: 2017-09-21

## 2017-09-21 NOTE — TELEPHONE ENCOUNTER
----- Message from REBECA Blancas sent at 9/21/2017 12:56 PM CDT -----  Call patient with result-- ok she needs to just take 1 extra 1 day not 2 days

## 2017-10-04 ENCOUNTER — ROUTINE PRENATAL (OUTPATIENT)
Dept: OBSTETRICS AND GYNECOLOGY | Facility: CLINIC | Age: 27
End: 2017-10-04

## 2017-10-04 ENCOUNTER — LAB (OUTPATIENT)
Dept: LAB | Facility: HOSPITAL | Age: 27
End: 2017-10-04

## 2017-10-04 ENCOUNTER — RESULTS ENCOUNTER (OUTPATIENT)
Dept: OBSTETRICS AND GYNECOLOGY | Facility: CLINIC | Age: 27
End: 2017-10-04

## 2017-10-04 VITALS — WEIGHT: 259 LBS | DIASTOLIC BLOOD PRESSURE: 87 MMHG | BODY MASS INDEX: 43.1 KG/M2 | SYSTOLIC BLOOD PRESSURE: 142 MMHG

## 2017-10-04 DIAGNOSIS — Z36.9 PRENATAL SCREENING ENCOUNTER: ICD-10-CM

## 2017-10-04 DIAGNOSIS — Z3A.26 26 WEEKS GESTATION OF PREGNANCY: ICD-10-CM

## 2017-10-04 DIAGNOSIS — O35.BXX1 ECHOGENIC FOCUS OF HEART OF FETUS AFFECTING ANTEPARTUM CARE OF MOTHER, FETUS 1: Primary | ICD-10-CM

## 2017-10-04 DIAGNOSIS — O99.810 ABNORMAL GLUCOSE TOLERANCE IN PREGNANCY: Primary | ICD-10-CM

## 2017-10-04 DIAGNOSIS — O99.282 HYPOTHYROIDISM AFFECTING PREGNANCY IN SECOND TRIMESTER: ICD-10-CM

## 2017-10-04 DIAGNOSIS — E03.9 HYPOTHYROIDISM AFFECTING PREGNANCY IN SECOND TRIMESTER: ICD-10-CM

## 2017-10-04 DIAGNOSIS — Z36.9 ANTENATAL SCREENING ENCOUNTER: ICD-10-CM

## 2017-10-04 DIAGNOSIS — O36.62X1: ICD-10-CM

## 2017-10-04 LAB
BASOPHILS # BLD AUTO: 0.02 10*3/MM3 (ref 0–0.2)
BASOPHILS NFR BLD AUTO: 0.2 % (ref 0–2)
DEPRECATED RDW RBC AUTO: 40.3 FL (ref 36.4–46.3)
EOSINOPHIL # BLD AUTO: 0.04 10*3/MM3 (ref 0–0.7)
EOSINOPHIL NFR BLD AUTO: 0.3 % (ref 0–7)
ERYTHROCYTE [DISTWIDTH] IN BLOOD BY AUTOMATED COUNT: 13.9 % (ref 11.5–14.5)
GLUCOSE 1H P 100 G GLC PO SERPL-MCNC: 144 MG/DL (ref 60–140)
HCT VFR BLD AUTO: 31.2 % (ref 35–45)
HGB BLD-MCNC: 10.1 G/DL (ref 12–15.5)
IMM GRANULOCYTES # BLD: 0.14 10*3/MM3 (ref 0–0.02)
IMM GRANULOCYTES NFR BLD: 1.2 % (ref 0–0.5)
LYMPHOCYTES # BLD AUTO: 1.39 10*3/MM3 (ref 0.6–4.2)
LYMPHOCYTES NFR BLD AUTO: 12.2 % (ref 10–50)
MCH RBC QN AUTO: 26 PG (ref 26.5–34)
MCHC RBC AUTO-ENTMCNC: 32.4 G/DL (ref 31.4–36)
MCV RBC AUTO: 80.2 FL (ref 80–98)
MONOCYTES # BLD AUTO: 0.62 10*3/MM3 (ref 0–0.9)
MONOCYTES NFR BLD AUTO: 5.4 % (ref 0–12)
NEUTROPHILS # BLD AUTO: 9.23 10*3/MM3 (ref 2–8.6)
NEUTROPHILS NFR BLD AUTO: 80.7 % (ref 37–80)
PLATELET # BLD AUTO: 220 10*3/MM3 (ref 150–450)
PMV BLD AUTO: 9.7 FL (ref 8–12)
RBC # BLD AUTO: 3.89 10*6/MM3 (ref 3.77–5.16)
WBC NRBC COR # BLD: 11.44 10*3/MM3 (ref 3.2–9.8)

## 2017-10-04 PROCEDURE — 85025 COMPLETE CBC W/AUTO DIFF WBC: CPT | Performed by: ADVANCED PRACTICE MIDWIFE

## 2017-10-04 PROCEDURE — 36415 COLL VENOUS BLD VENIPUNCTURE: CPT | Performed by: ADVANCED PRACTICE MIDWIFE

## 2017-10-04 PROCEDURE — 82950 GLUCOSE TEST: CPT | Performed by: ADVANCED PRACTICE MIDWIFE

## 2017-10-04 PROCEDURE — 99213 OFFICE O/P EST LOW 20 MIN: CPT | Performed by: NURSE PRACTITIONER

## 2017-10-04 RX ORDER — DOXYCYCLINE HYCLATE 50 MG/1
324 CAPSULE, GELATIN COATED ORAL
Qty: 90 TABLET | Refills: 8 | Status: SHIPPED | OUTPATIENT
Start: 2017-10-04 | End: 2018-01-08

## 2017-10-04 RX ORDER — DOCUSATE SODIUM 100 MG/1
100 CAPSULE, LIQUID FILLED ORAL DAILY PRN
Qty: 30 CAPSULE | Refills: 10 | Status: SHIPPED | OUTPATIENT
Start: 2017-10-04 | End: 2018-01-08

## 2017-10-04 NOTE — PROGRESS NOTES
Chief Complaint   Patient presents with   • Routine Prenatal Visit     1 hour GTT       The patient complains of the following: No complaints    ROS  Vaginal bleeding: No   Nausea: No   Diarrhea: No   Constipation: No   Other:      Lab Results   Component Value Date    ABO O 2017    RH Positive 2017    ABSCRN Negative 2017       Specific topics discussed at today's visit: labor signs and symptoms, swelling and preregistration form completed. Taking 125mcg of levothyroxine daily and 1 day per week will take 2 per Beto Aquino.  Tests done today: GCT  CBC  Tests to be done at the next visit: none  Will need f/u anatomy for cardiac echogenic focus. Also added EFW for S>D.    Jerrica was seen today for routine prenatal visit.    Diagnoses and all orders for this visit:    Echogenic focus of heart of fetus affecting antepartum care of mother, fetus 1  -     US Ob Follow Up Transabdominal Approach; Future    Large for dates affecting management of mother, second trimester, fetus 1  -     US Ob Follow Up Transabdominal Approach; Future    26 weeks gestation of pregnancy    Hypothyroidism affecting pregnancy in second trimester    Prenatal screening encounter

## 2017-10-05 ENCOUNTER — LAB (OUTPATIENT)
Dept: LAB | Facility: HOSPITAL | Age: 27
End: 2017-10-05

## 2017-10-05 DIAGNOSIS — O99.810 ABNORMAL GLUCOSE TOLERANCE IN PREGNANCY: ICD-10-CM

## 2017-10-05 LAB
GLUCOSE 1H P 100 G GLC PO SERPL-MCNC: 144 MG/DL (ref 60–140)
GLUCOSE 2H P 100 G GLC PO SERPL-MCNC: 146 MG/DL (ref 50–399)
GLUCOSE 3H P 100 G GLC PO SERPL-MCNC: 129 MG/DL (ref 50–400)
GLUCOSE P FAST SERPL-MCNC: 89 MG/DL (ref 60–110)

## 2017-10-05 PROCEDURE — 82952 GTT-ADDED SAMPLES: CPT | Performed by: ADVANCED PRACTICE MIDWIFE

## 2017-10-05 PROCEDURE — 82951 GLUCOSE TOLERANCE TEST (GTT): CPT | Performed by: ADVANCED PRACTICE MIDWIFE

## 2017-10-24 ENCOUNTER — TELEPHONE (OUTPATIENT)
Dept: ENDOCRINOLOGY | Facility: CLINIC | Age: 27
End: 2017-10-24

## 2017-10-24 ENCOUNTER — LAB (OUTPATIENT)
Dept: LAB | Facility: HOSPITAL | Age: 27
End: 2017-10-24

## 2017-10-24 DIAGNOSIS — E06.3 HASHIMOTO'S THYROIDITIS: ICD-10-CM

## 2017-10-24 LAB — TSH SERPL DL<=0.05 MIU/L-ACNC: 0.96 MIU/ML (ref 0.46–4.68)

## 2017-10-24 PROCEDURE — 84443 ASSAY THYROID STIM HORMONE: CPT | Performed by: NURSE PRACTITIONER

## 2017-10-24 PROCEDURE — 36415 COLL VENOUS BLD VENIPUNCTURE: CPT

## 2017-10-24 NOTE — TELEPHONE ENCOUNTER
----- Message from REBECA Blancas sent at 10/24/2017  3:40 PM CDT -----  Call patient with result--thyroid level is normal no change in dosage

## 2017-11-01 ENCOUNTER — APPOINTMENT (OUTPATIENT)
Dept: LAB | Facility: HOSPITAL | Age: 27
End: 2017-11-01

## 2017-11-01 ENCOUNTER — HOSPITAL ENCOUNTER (OUTPATIENT)
Facility: HOSPITAL | Age: 27
Discharge: HOME | End: 2017-11-02
Attending: OBSTETRICS & GYNECOLOGY | Admitting: OBSTETRICS & GYNECOLOGY

## 2017-11-01 ENCOUNTER — ROUTINE PRENATAL (OUTPATIENT)
Dept: OBSTETRICS AND GYNECOLOGY | Facility: CLINIC | Age: 27
End: 2017-11-01

## 2017-11-01 VITALS — DIASTOLIC BLOOD PRESSURE: 91 MMHG | WEIGHT: 262 LBS | BODY MASS INDEX: 43.6 KG/M2 | SYSTOLIC BLOOD PRESSURE: 147 MMHG

## 2017-11-01 DIAGNOSIS — E03.9 HYPOTHYROIDISM AFFECTING PREGNANCY IN SECOND TRIMESTER: Primary | ICD-10-CM

## 2017-11-01 DIAGNOSIS — O16.3 HIGH BLOOD PRESSURE AFFECTING PREGNANCY IN THIRD TRIMESTER, ANTEPARTUM: Primary | ICD-10-CM

## 2017-11-01 DIAGNOSIS — O99.283 HYPOTHYROIDISM AFFECTING PREGNANCY IN THIRD TRIMESTER: ICD-10-CM

## 2017-11-01 DIAGNOSIS — O26.843 SIGNIFICANT DISCREPANCY BETWEEN UTERINE SIZE AND CLINICAL DATES, ANTEPARTUM, THIRD TRIMESTER: ICD-10-CM

## 2017-11-01 DIAGNOSIS — O99.282 HYPOTHYROIDISM AFFECTING PREGNANCY IN SECOND TRIMESTER: Primary | ICD-10-CM

## 2017-11-01 DIAGNOSIS — E03.9 HYPOTHYROIDISM AFFECTING PREGNANCY IN THIRD TRIMESTER: ICD-10-CM

## 2017-11-01 DIAGNOSIS — Z3A.30 30 WEEKS GESTATION OF PREGNANCY: ICD-10-CM

## 2017-11-01 LAB
ALBUMIN SERPL-MCNC: 3.4 G/DL (ref 3.4–4.8)
ALBUMIN/GLOB SERPL: 1 G/DL (ref 1.1–1.8)
ALP SERPL-CCNC: 101 U/L (ref 38–126)
ALT SERPL W P-5'-P-CCNC: 26 U/L (ref 9–52)
ANION GAP SERPL CALCULATED.3IONS-SCNC: 9 MMOL/L (ref 5–15)
AST SERPL-CCNC: 20 U/L (ref 14–36)
BASOPHILS # BLD AUTO: 0.01 10*3/MM3 (ref 0–0.2)
BASOPHILS NFR BLD AUTO: 0.1 % (ref 0–2)
BILIRUB SERPL-MCNC: 0.2 MG/DL (ref 0.2–1.3)
BUN BLD-MCNC: 6 MG/DL (ref 7–21)
BUN/CREAT SERPL: 10 (ref 7–25)
CALCIUM SPEC-SCNC: 8.7 MG/DL (ref 8.4–10.2)
CHLORIDE SERPL-SCNC: 106 MMOL/L (ref 95–110)
CO2 SERPL-SCNC: 24 MMOL/L (ref 22–31)
CREAT BLD-MCNC: 0.6 MG/DL (ref 0.5–1)
DEPRECATED RDW RBC AUTO: 44.5 FL (ref 36.4–46.3)
EOSINOPHIL # BLD AUTO: 0.05 10*3/MM3 (ref 0–0.7)
EOSINOPHIL NFR BLD AUTO: 0.4 % (ref 0–7)
ERYTHROCYTE [DISTWIDTH] IN BLOOD BY AUTOMATED COUNT: 15.5 % (ref 11.5–14.5)
GFR SERPL CREATININE-BSD FRML MDRD: 121 ML/MIN/1.73 (ref 71–165)
GLOBULIN UR ELPH-MCNC: 3.4 GM/DL (ref 2.3–3.5)
GLUCOSE BLD-MCNC: 85 MG/DL (ref 60–100)
HCT VFR BLD AUTO: 33.4 % (ref 35–45)
HGB BLD-MCNC: 10.9 G/DL (ref 12–15.5)
IMM GRANULOCYTES # BLD: 0.1 10*3/MM3 (ref 0–0.02)
IMM GRANULOCYTES NFR BLD: 0.8 % (ref 0–0.5)
LYMPHOCYTES # BLD AUTO: 1.57 10*3/MM3 (ref 0.6–4.2)
LYMPHOCYTES NFR BLD AUTO: 13.2 % (ref 10–50)
MCH RBC QN AUTO: 26 PG (ref 26.5–34)
MCHC RBC AUTO-ENTMCNC: 32.6 G/DL (ref 31.4–36)
MCV RBC AUTO: 79.7 FL (ref 80–98)
MONOCYTES # BLD AUTO: 1.03 10*3/MM3 (ref 0–0.9)
MONOCYTES NFR BLD AUTO: 8.6 % (ref 0–12)
NEUTROPHILS # BLD AUTO: 9.16 10*3/MM3 (ref 2–8.6)
NEUTROPHILS NFR BLD AUTO: 76.9 % (ref 37–80)
PLATELET # BLD AUTO: 181 10*3/MM3 (ref 150–450)
PMV BLD AUTO: 9.5 FL (ref 8–12)
POTASSIUM BLD-SCNC: 4.2 MMOL/L (ref 3.5–5.1)
PROT SERPL-MCNC: 6.8 G/DL (ref 6.3–8.6)
RBC # BLD AUTO: 4.19 10*6/MM3 (ref 3.77–5.16)
SODIUM BLD-SCNC: 139 MMOL/L (ref 137–145)
WBC NRBC COR # BLD: 11.92 10*3/MM3 (ref 3.2–9.8)

## 2017-11-01 PROCEDURE — 59025 FETAL NON-STRESS TEST: CPT

## 2017-11-01 PROCEDURE — 80053 COMPREHEN METABOLIC PANEL: CPT | Performed by: OBSTETRICS & GYNECOLOGY

## 2017-11-01 PROCEDURE — 36415 COLL VENOUS BLD VENIPUNCTURE: CPT | Performed by: OBSTETRICS & GYNECOLOGY

## 2017-11-01 PROCEDURE — 99212 OFFICE O/P EST SF 10 MIN: CPT | Performed by: OBSTETRICS & GYNECOLOGY

## 2017-11-01 PROCEDURE — 85025 COMPLETE CBC W/AUTO DIFF WBC: CPT | Performed by: OBSTETRICS & GYNECOLOGY

## 2017-11-01 RX ORDER — LEVOTHYROXINE SODIUM 0.12 MG/1
250 TABLET ORAL
Status: DISCONTINUED | OUTPATIENT
Start: 2017-11-02 | End: 2017-11-02 | Stop reason: HOSPADM

## 2017-11-01 NOTE — NON STRESS TEST
Jerrica Marquez, a  at 30w1d with an TERESITA of 2018, by Ultrasound, was seen at [facility] LABOR DELIVERY for a nonstress test.    Chief Complaint   Patient presents with   • Hypertension     c/o high blood pressure at home. slight headache and some nausea for past few days       Interpretation A  Nonstress Test Interpretation A: Reactive (17 : Olivia Chapa RN)  Comments A: reveiwed with RANJIT Felix RN (17 : Olivia Chapa, HARITHA)

## 2017-11-01 NOTE — PROGRESS NOTES
CC:  visit    HPI:  States she has noticed that her BPs have started to creep up, has been taking BPs at home, never had higher than 150s/90s.  Denies HA, CP, SOB, RUQ pain, or changes vision.  Does have occasional dizzy spells, improves with rest.      +FM, no LOF or vb. Occasional BH.  Tried compression stockings for swelling, not really a fan.   Has been elevating at nighttime.     PE - see vitals; repeat /90  Ext - trace edema bilaterally     A/P: 25 yo  @ 30w1d by 7w orquidea presents for  visit.   1. Continue routine prenatal care  - Encouraged PNV  - PTL precautions  - O+, RI, HIV neg, RPR NR, HbsAg neg, GC/CT neg/neg, elevated 1 hr and normal 3 hour  - Quad screen negative    - RTC in 1 week      2. Hypothyroidism  - TSH 0.96 on 10/24, managed by endocrine  - Currently on 125mcg daily with one extra pill a week    3. S>D  - IG on 11/15    4. gHTN  - Repeat BP was 146/90  - Asymptomatic  - Discussed option of 24 hr UTP in house vs at home, patient would like to complete at home.    - Will do CBC and CMP today, 24 hour UTP given  - PreE precautions given      Jayne BYRNE Friday

## 2017-11-02 VITALS
SYSTOLIC BLOOD PRESSURE: 142 MMHG | DIASTOLIC BLOOD PRESSURE: 89 MMHG | TEMPERATURE: 98.4 F | RESPIRATION RATE: 16 BRPM | OXYGEN SATURATION: 98 % | HEART RATE: 95 BPM

## 2017-11-02 LAB
ALBUMIN SERPL-MCNC: 3.1 G/DL (ref 3.4–4.8)
ALBUMIN/GLOB SERPL: 1 G/DL (ref 1.1–1.8)
ALP SERPL-CCNC: 93 U/L (ref 38–126)
ALT SERPL W P-5'-P-CCNC: 26 U/L (ref 9–52)
ANION GAP SERPL CALCULATED.3IONS-SCNC: 11 MMOL/L (ref 5–15)
AST SERPL-CCNC: 19 U/L (ref 14–36)
BASOPHILS # BLD AUTO: 0.01 10*3/MM3 (ref 0–0.2)
BASOPHILS NFR BLD AUTO: 0.1 % (ref 0–2)
BILIRUB SERPL-MCNC: 0.2 MG/DL (ref 0.2–1.3)
BUN BLD-MCNC: 7 MG/DL (ref 7–21)
BUN/CREAT SERPL: 11.7 (ref 7–25)
CALCIUM SPEC-SCNC: 8.6 MG/DL (ref 8.4–10.2)
CHLORIDE SERPL-SCNC: 105 MMOL/L (ref 95–110)
CO2 SERPL-SCNC: 22 MMOL/L (ref 22–31)
COLLECT DURATION TIME UR: 24 HRS
CREAT BLD-MCNC: 0.6 MG/DL (ref 0.5–1)
DEPRECATED RDW RBC AUTO: 46.7 FL (ref 36.4–46.3)
EOSINOPHIL # BLD AUTO: 0.05 10*3/MM3 (ref 0–0.7)
EOSINOPHIL NFR BLD AUTO: 0.5 % (ref 0–7)
ERYTHROCYTE [DISTWIDTH] IN BLOOD BY AUTOMATED COUNT: 15.8 % (ref 11.5–14.5)
GFR SERPL CREATININE-BSD FRML MDRD: 121 ML/MIN/1.73 (ref 71–165)
GLOBULIN UR ELPH-MCNC: 3.1 GM/DL (ref 2.3–3.5)
GLUCOSE BLD-MCNC: 143 MG/DL (ref 60–100)
HCT VFR BLD AUTO: 32.2 % (ref 35–45)
HGB BLD-MCNC: 10.6 G/DL (ref 12–15.5)
IMM GRANULOCYTES # BLD: 0.12 10*3/MM3 (ref 0–0.02)
IMM GRANULOCYTES NFR BLD: 1.2 % (ref 0–0.5)
LYMPHOCYTES # BLD AUTO: 1.48 10*3/MM3 (ref 0.6–4.2)
LYMPHOCYTES NFR BLD AUTO: 14.6 % (ref 10–50)
MCH RBC QN AUTO: 26.8 PG (ref 26.5–34)
MCHC RBC AUTO-ENTMCNC: 32.9 G/DL (ref 31.4–36)
MCV RBC AUTO: 81.5 FL (ref 80–98)
MONOCYTES # BLD AUTO: 0.57 10*3/MM3 (ref 0–0.9)
MONOCYTES NFR BLD AUTO: 5.6 % (ref 0–12)
NEUTROPHILS # BLD AUTO: 7.91 10*3/MM3 (ref 2–8.6)
NEUTROPHILS NFR BLD AUTO: 78 % (ref 37–80)
PLATELET # BLD AUTO: 195 10*3/MM3 (ref 150–450)
PMV BLD AUTO: 9.5 FL (ref 8–12)
POTASSIUM BLD-SCNC: 3.9 MMOL/L (ref 3.5–5.1)
PROT 24H UR-MRATE: 400.4 MG/24HOURS (ref 0–230)
PROT SERPL-MCNC: 6.2 G/DL (ref 6.3–8.6)
PROT UR-MCNC: 18.2 MG/DL
RBC # BLD AUTO: 3.95 10*6/MM3 (ref 3.77–5.16)
SODIUM BLD-SCNC: 138 MMOL/L (ref 137–145)
SPECIMEN VOL 24H UR: 2200 ML
WBC NRBC COR # BLD: 10.14 10*3/MM3 (ref 3.2–9.8)

## 2017-11-02 PROCEDURE — 59025 FETAL NON-STRESS TEST: CPT

## 2017-11-02 PROCEDURE — 80053 COMPREHEN METABOLIC PANEL: CPT | Performed by: OBSTETRICS & GYNECOLOGY

## 2017-11-02 PROCEDURE — 81050 URINALYSIS VOLUME MEASURE: CPT | Performed by: OBSTETRICS & GYNECOLOGY

## 2017-11-02 PROCEDURE — 85025 COMPLETE CBC W/AUTO DIFF WBC: CPT | Performed by: OBSTETRICS & GYNECOLOGY

## 2017-11-02 PROCEDURE — 84156 ASSAY OF PROTEIN URINE: CPT | Performed by: OBSTETRICS & GYNECOLOGY

## 2017-11-02 PROCEDURE — G0463 HOSPITAL OUTPT CLINIC VISIT: HCPCS

## 2017-11-02 RX ADMIN — LEVOTHYROXINE SODIUM 250 MCG: 125 TABLET ORAL at 07:57

## 2017-11-02 NOTE — NON STRESS TEST
Jerrica Marquez, a  at 30w2d with an TERESITA of 2018, by Ultrasound, was seen at [facility] LABOR DELIVERY for a nonstress test.    Chief Complaint   Patient presents with   • Hypertension     c/o high blood pressure at home. slight headache and some nausea for past few days       Interpretation A  Nonstress Test Interpretation A: Reactive (17 1530 : Elana Felix, HARITHA)  Comments A: reviewed with RANJIT Bonds RN (17 1530 : Elaan Felix, HARITHA)

## 2017-11-02 NOTE — NON STRESS TEST
Jerrica Marquez, a  at 30w2d with an TERESITA of 2018, by Ultrasound, was seen at [facility] LABOR DELIVERY for a nonstress test.    Chief Complaint   Patient presents with   • Hypertension     c/o high blood pressure at home. slight headache and some nausea for past few days       Interpretation A  Nonstress Test Interpretation A: Reactive (17 0559 : Purnima Conrad, RN)  Comments A: reviewed with hilda MG (17 0559 : Purnima Conrad, HARITHA)

## 2017-11-03 NOTE — DISCHARGE INSTR - APPOINTMENTS
Return to hospital for regular painful contractions, bleeding, decreased fetal movement, or water breaks.     Follow preeclampsia precautions in the attachment.     Will most likely be seen in office 2x per week per Dr. Ingram orders.     Follow up within the next 4-5 days per Dr. Ingram orders.     Call office or on call OB with any questions.

## 2017-11-08 ENCOUNTER — ROUTINE PRENATAL (OUTPATIENT)
Dept: OBSTETRICS AND GYNECOLOGY | Facility: CLINIC | Age: 27
End: 2017-11-08

## 2017-11-08 VITALS — DIASTOLIC BLOOD PRESSURE: 85 MMHG | BODY MASS INDEX: 43.6 KG/M2 | SYSTOLIC BLOOD PRESSURE: 136 MMHG | WEIGHT: 262 LBS

## 2017-11-08 DIAGNOSIS — E03.9 HYPOTHYROIDISM AFFECTING PREGNANCY IN THIRD TRIMESTER: Primary | ICD-10-CM

## 2017-11-08 DIAGNOSIS — O36.63X1 LARGE FOR DATES AFFECTING MANAGEMENT OF MOTHER, THIRD TRIMESTER, FETUS 1: ICD-10-CM

## 2017-11-08 DIAGNOSIS — Z3A.31 31 WEEKS GESTATION OF PREGNANCY: ICD-10-CM

## 2017-11-08 DIAGNOSIS — O99.283 HYPOTHYROIDISM AFFECTING PREGNANCY IN THIRD TRIMESTER: Primary | ICD-10-CM

## 2017-11-08 DIAGNOSIS — O14.03 MILD PREECLAMPSIA, THIRD TRIMESTER: ICD-10-CM

## 2017-11-08 PROCEDURE — 99212 OFFICE O/P EST SF 10 MIN: CPT | Performed by: OBSTETRICS & GYNECOLOGY

## 2017-11-08 NOTE — PROGRESS NOTES
CC:  visit    HPI:  Still having occasional dizzy spells, states that when her BP is up; highest is 160s top number but after resting always comes down to normal.  No HA, changes in vision, or RUQ pain.  Baby moving well.  No LOF or vb.  Occasional ctx.      PE - see vitals    A/P: 25 yo  @ 31w1d by 7w orquidea presents for  visit.   1. Continue routine prenatal care  - Encouraged PNV  - PTL precautions  - O+, RI, HIV neg, RPR NR, HbsAg neg, GC/CT neg/neg, elevated 1 hr and normal 3 hour  - Quad screen negative    - RTC in 1 week      2. Hypothyroidism  - TSH 0.96 on 10/24, managed by endocrine  - Currently on 125mcg daily with one extra pill a week     3. S>D  - IG on 11/15     4. PreE without severe features  - Based on BPs and 24UPT of 400mg  - Asymptomatic  - Precautions given  - Will start  testing next week with IOL at 37 weeks      Jayne CATY Friday

## 2017-11-15 ENCOUNTER — APPOINTMENT (OUTPATIENT)
Dept: LAB | Facility: HOSPITAL | Age: 27
End: 2017-11-15

## 2017-11-15 ENCOUNTER — ROUTINE PRENATAL (OUTPATIENT)
Dept: OBSTETRICS AND GYNECOLOGY | Facility: CLINIC | Age: 27
End: 2017-11-15

## 2017-11-15 VITALS — BODY MASS INDEX: 43.77 KG/M2 | SYSTOLIC BLOOD PRESSURE: 126 MMHG | WEIGHT: 263 LBS | DIASTOLIC BLOOD PRESSURE: 79 MMHG

## 2017-11-15 DIAGNOSIS — O36.62X1: ICD-10-CM

## 2017-11-15 DIAGNOSIS — E03.9 HYPOTHYROIDISM AFFECTING PREGNANCY IN THIRD TRIMESTER: ICD-10-CM

## 2017-11-15 DIAGNOSIS — O99.283 HYPOTHYROIDISM AFFECTING PREGNANCY IN THIRD TRIMESTER: ICD-10-CM

## 2017-11-15 DIAGNOSIS — Z3A.32 32 WEEKS GESTATION OF PREGNANCY: ICD-10-CM

## 2017-11-15 DIAGNOSIS — O14.93 PRE-ECLAMPSIA IN THIRD TRIMESTER: Primary | ICD-10-CM

## 2017-11-15 LAB
ALBUMIN SERPL-MCNC: 3.5 G/DL (ref 3.4–4.8)
ALBUMIN/GLOB SERPL: 1 G/DL (ref 1.1–1.8)
ALP SERPL-CCNC: 120 U/L (ref 38–126)
ALT SERPL W P-5'-P-CCNC: 27 U/L (ref 9–52)
ANION GAP SERPL CALCULATED.3IONS-SCNC: 11 MMOL/L (ref 5–15)
AST SERPL-CCNC: 23 U/L (ref 14–36)
BILIRUB SERPL-MCNC: 0.4 MG/DL (ref 0.2–1.3)
BUN BLD-MCNC: 6 MG/DL (ref 7–21)
BUN/CREAT SERPL: 10 (ref 7–25)
CALCIUM SPEC-SCNC: 9 MG/DL (ref 8.4–10.2)
CHLORIDE SERPL-SCNC: 104 MMOL/L (ref 95–110)
CO2 SERPL-SCNC: 22 MMOL/L (ref 22–31)
CREAT BLD-MCNC: 0.6 MG/DL (ref 0.5–1)
GFR SERPL CREATININE-BSD FRML MDRD: 121 ML/MIN/1.73 (ref 71–165)
GLOBULIN UR ELPH-MCNC: 3.4 GM/DL (ref 2.3–3.5)
GLUCOSE BLD-MCNC: 90 MG/DL (ref 60–100)
POTASSIUM BLD-SCNC: 3.7 MMOL/L (ref 3.5–5.1)
PROT SERPL-MCNC: 6.9 G/DL (ref 6.3–8.6)
SODIUM BLD-SCNC: 137 MMOL/L (ref 137–145)

## 2017-11-15 PROCEDURE — 80053 COMPREHEN METABOLIC PANEL: CPT | Performed by: OBSTETRICS & GYNECOLOGY

## 2017-11-15 PROCEDURE — 36415 COLL VENOUS BLD VENIPUNCTURE: CPT | Performed by: OBSTETRICS & GYNECOLOGY

## 2017-11-15 PROCEDURE — 99212 OFFICE O/P EST SF 10 MIN: CPT | Performed by: OBSTETRICS & GYNECOLOGY

## 2017-11-15 NOTE — PROGRESS NOTES
CC:  visit    HPI:  Has had some pain in the right side of her abdomen, only last an hour at most.  Improves with rest. No cramping or ctx.  +FM.  No LOF or vb.   States BPs have been good at home and not elevated like normal.     PE -see vitals  IG - EFW 2152g (63%tile), FABRIZIO 17.6, EIF resolved    A/P: 27 yo  @ 32w1d by 7w orquidea presents for  visit.   1. Continue routine prenatal care  - Encouraged PNV  - PTL precautions  - O+, RI, HIV neg, RPR NR, HbsAg neg, GC/CT neg/neg, elevated 1 hr and normal 3 hour  - Quad screen negative    - RTC in 1 week      2. Hypothyroidism  - TSH 0.96 on 10/24, managed by endocrine  - Currently on 125mcg daily with one extra pill a week      3. S>D  - IG 63%tile      4. PreE without severe features  - Based on BPs and 24UPT of 400mg  - Precautions given, NST next visit  - CMP today based on RUQ pain   - IOL scheduled for 37 weeks ()

## 2017-11-22 ENCOUNTER — ROUTINE PRENATAL (OUTPATIENT)
Dept: OBSTETRICS AND GYNECOLOGY | Facility: CLINIC | Age: 27
End: 2017-11-22

## 2017-11-22 ENCOUNTER — TELEPHONE (OUTPATIENT)
Dept: ENDOCRINOLOGY | Facility: CLINIC | Age: 27
End: 2017-11-22

## 2017-11-22 ENCOUNTER — LAB (OUTPATIENT)
Dept: LAB | Facility: HOSPITAL | Age: 27
End: 2017-11-22

## 2017-11-22 VITALS — BODY MASS INDEX: 44.43 KG/M2 | WEIGHT: 267 LBS | SYSTOLIC BLOOD PRESSURE: 123 MMHG | DIASTOLIC BLOOD PRESSURE: 74 MMHG

## 2017-11-22 DIAGNOSIS — E03.9 HYPOTHYROIDISM AFFECTING PREGNANCY IN SECOND TRIMESTER: Primary | ICD-10-CM

## 2017-11-22 DIAGNOSIS — O99.282 HYPOTHYROIDISM AFFECTING PREGNANCY IN SECOND TRIMESTER: Primary | ICD-10-CM

## 2017-11-22 DIAGNOSIS — O14.93 PREECLAMPSIA, THIRD TRIMESTER: ICD-10-CM

## 2017-11-22 DIAGNOSIS — Z3A.33 33 WEEKS GESTATION OF PREGNANCY: ICD-10-CM

## 2017-11-22 DIAGNOSIS — E06.3 HASHIMOTO'S THYROIDITIS: ICD-10-CM

## 2017-11-22 LAB — TSH SERPL DL<=0.05 MIU/L-ACNC: 1.71 MIU/ML (ref 0.46–4.68)

## 2017-11-22 PROCEDURE — 36415 COLL VENOUS BLD VENIPUNCTURE: CPT

## 2017-11-22 PROCEDURE — 99212 OFFICE O/P EST SF 10 MIN: CPT | Performed by: OBSTETRICS & GYNECOLOGY

## 2017-11-22 PROCEDURE — 84443 ASSAY THYROID STIM HORMONE: CPT

## 2017-11-22 NOTE — TELEPHONE ENCOUNTER
----- Message from REBECA Blancas sent at 11/22/2017  2:31 PM CST -----  Call patient with result---thyroid at goal keep same dosage

## 2017-11-22 NOTE — PROGRESS NOTES
CC:  visit    HPI:  Doing okay, had a stressful week.  A [place] fell [place] their property on , feels like Monday was stressful.  Had headaches that day but nothing since.  No RUQ pain, changes in vision, SOB, or CP.  +FM.  No cramping or ctx.  No LOF or vb.     PE - see vitals  NST - reactive NST    A/P: 27 yo  @ 33w1d by 7w cinthyao presents for  visit.   1. Continue routine prenatal care  - Encouraged PNV  - PTL precautions  - O+, RI, HIV neg, RPR NR, HbsAg neg, GC/CT neg/neg, elevated 1 hr and normal 3 hour  - Quad screen negative    - RTC in 1 week      2. Hypothyroidism  - TSH 0.96 on 10/24, managed by endocrine  - Currently on 125mcg daily with one extra pill a week      3. S>D  - IG 63%tile      4. PreE without severe features  - Based on BPs and 24UPT of 400mg  - Precautions given  - NST twice weekly starting next week  - IOL scheduled for 37 weeks ()    Jayne BYRNE Friday

## 2017-11-29 ENCOUNTER — ROUTINE PRENATAL (OUTPATIENT)
Dept: OBSTETRICS AND GYNECOLOGY | Facility: CLINIC | Age: 27
End: 2017-11-29

## 2017-11-29 VITALS — BODY MASS INDEX: 45.76 KG/M2 | WEIGHT: 275 LBS | SYSTOLIC BLOOD PRESSURE: 119 MMHG | DIASTOLIC BLOOD PRESSURE: 73 MMHG

## 2017-11-29 DIAGNOSIS — O14.03 MILD PREECLAMPSIA, THIRD TRIMESTER: ICD-10-CM

## 2017-11-29 DIAGNOSIS — E03.9 HYPOTHYROIDISM AFFECTING PREGNANCY IN SECOND TRIMESTER: Primary | ICD-10-CM

## 2017-11-29 DIAGNOSIS — O26.843 SIGNIFICANT DISCREPANCY BETWEEN UTERINE SIZE AND CLINICAL DATES, ANTEPARTUM, THIRD TRIMESTER: ICD-10-CM

## 2017-11-29 DIAGNOSIS — Z3A.34 34 WEEKS GESTATION OF PREGNANCY: ICD-10-CM

## 2017-11-29 DIAGNOSIS — O99.282 HYPOTHYROIDISM AFFECTING PREGNANCY IN SECOND TRIMESTER: Primary | ICD-10-CM

## 2017-11-29 PROCEDURE — 99212 OFFICE O/P EST SF 10 MIN: CPT | Performed by: OBSTETRICS & GYNECOLOGY

## 2017-11-29 NOTE — PROGRESS NOTES
CC:  visit    HPI: Doing well, still having some dizzy spells, notices BP at home are higher then these spells.  Nothing regular.  Mild HA on  but resolved with tylenol.  No RUQ pain.  Occasional BH but nothing regular.  +FM.  No LOF or vb.      PE - see vitals  NST- 135 baseline, +accels, no decels, moderate variability, category 1 strip    A/P: 25 yo  @ 34w1d by 7w orquidea presents for  visit.   1. Continue routine prenatal care  - Encouraged PNV  - PTL precautions  - O+, RI, HIV neg, RPR NR, HbsAg neg, GC/CT neg/neg, elevated 1 hr and normal 3 hour  - Quad screen negative    - RTC in 6 days for NST      2. Hypothyroidism  - TSH 0.96 on 10/24, managed by endocrine  - Currently on 125mcg daily with one extra pill a week      3. S>D  - IG 63%tile      4. PreE without severe features  - Based on BPs and 24UTP of 400mg  - Precautions given  - NST twice weekly, IG with BPP in 2 weeks  - IOL scheduled for 37 weeks ()     Jayne BYRNE Friday

## 2017-12-02 ENCOUNTER — HOSPITAL ENCOUNTER (OUTPATIENT)
Facility: HOSPITAL | Age: 27
Discharge: HOME | End: 2017-12-02
Attending: OBSTETRICS & GYNECOLOGY | Admitting: OBSTETRICS & GYNECOLOGY

## 2017-12-02 VITALS — DIASTOLIC BLOOD PRESSURE: 66 MMHG | HEART RATE: 83 BPM | SYSTOLIC BLOOD PRESSURE: 133 MMHG

## 2017-12-02 LAB
BACTERIA UR QL AUTO: ABNORMAL /HPF
BILIRUB UR QL STRIP: NEGATIVE
CLARITY UR: ABNORMAL
COLOR UR: YELLOW
GLUCOSE UR STRIP-MCNC: NEGATIVE MG/DL
HGB UR QL STRIP.AUTO: ABNORMAL
HYALINE CASTS UR QL AUTO: ABNORMAL /LPF
KETONES UR QL STRIP: NEGATIVE
LEUKOCYTE ESTERASE UR QL STRIP.AUTO: ABNORMAL
NITRITE UR QL STRIP: NEGATIVE
PH UR STRIP.AUTO: 6.5 [PH] (ref 5–9)
PROT UR QL STRIP: NEGATIVE
RBC # UR: ABNORMAL /HPF
REF LAB TEST METHOD: ABNORMAL
SP GR UR STRIP: 1.01 (ref 1–1.03)
SQUAMOUS #/AREA URNS HPF: ABNORMAL /HPF
UROBILINOGEN UR QL STRIP: ABNORMAL
WBC UR QL AUTO: ABNORMAL /HPF

## 2017-12-02 PROCEDURE — G0463 HOSPITAL OUTPT CLINIC VISIT: HCPCS

## 2017-12-02 PROCEDURE — 59025 FETAL NON-STRESS TEST: CPT

## 2017-12-02 PROCEDURE — 81001 URINALYSIS AUTO W/SCOPE: CPT | Performed by: OBSTETRICS & GYNECOLOGY

## 2017-12-02 RX ORDER — ACETAMINOPHEN 500 MG
500 TABLET ORAL EVERY 6 HOURS PRN
COMMUNITY
End: 2018-01-08

## 2017-12-03 PROCEDURE — 59025 FETAL NON-STRESS TEST: CPT

## 2017-12-03 PROCEDURE — G0463 HOSPITAL OUTPT CLINIC VISIT: HCPCS

## 2017-12-03 NOTE — DISCHARGE INSTR - ACTIVITY
Return or call provider for strong regular contractions which are 3-5 minutes apart after comfort measures.  Comfort measures include warm bath or shower, resting and drinking large glass of water.  Also return for leakage of fluids, vaginal bleeding or decreased fetal movements.  Drink plenty of water, keep next scheduled appointment and take all medications as prescribed.

## 2017-12-03 NOTE — NON STRESS TEST
Jerrica Marquez, a  at 34w5d with an TERESITA of 2018, by Ultrasound, was seen at [facility] LABOR DELIVERY for a nonstress test.    Chief Complaint   Patient presents with   • Non-stress Test     pt states having bad headache, took tylenol, laid down and blood pressure 200/112, 30 min later 190/108, 150/100 after laying on side another 30 min.  Increased again after getting up to go to bathroom.         Interpretation A  Nonstress Test Interpretation A: Reactive (17 : Mihaela Crawford RN)  Comments A: reviewed with Bebe MG (17 : Mihaela Crawford RN)        Pts bp was monitored, SVE 0, pt feels UC but are not painful.

## 2017-12-04 ENCOUNTER — APPOINTMENT (OUTPATIENT)
Dept: LAB | Facility: HOSPITAL | Age: 27
End: 2017-12-04

## 2017-12-04 ENCOUNTER — ROUTINE PRENATAL (OUTPATIENT)
Dept: OBSTETRICS AND GYNECOLOGY | Facility: CLINIC | Age: 27
End: 2017-12-04

## 2017-12-04 VITALS — DIASTOLIC BLOOD PRESSURE: 93 MMHG | WEIGHT: 270 LBS | SYSTOLIC BLOOD PRESSURE: 135 MMHG | BODY MASS INDEX: 44.93 KG/M2

## 2017-12-04 DIAGNOSIS — O14.03 MILD PREECLAMPSIA, THIRD TRIMESTER: Primary | ICD-10-CM

## 2017-12-04 DIAGNOSIS — O99.282 HYPOTHYROIDISM AFFECTING PREGNANCY IN SECOND TRIMESTER: ICD-10-CM

## 2017-12-04 DIAGNOSIS — Z3A.34 34 WEEKS GESTATION OF PREGNANCY: ICD-10-CM

## 2017-12-04 DIAGNOSIS — E03.9 HYPOTHYROIDISM AFFECTING PREGNANCY IN SECOND TRIMESTER: ICD-10-CM

## 2017-12-04 LAB
ALBUMIN SERPL-MCNC: 3.2 G/DL (ref 3.4–4.8)
ALBUMIN/GLOB SERPL: 0.9 G/DL (ref 1.1–1.8)
ALP SERPL-CCNC: 152 U/L (ref 38–126)
ALT SERPL W P-5'-P-CCNC: 26 U/L (ref 9–52)
ANION GAP SERPL CALCULATED.3IONS-SCNC: 9 MMOL/L (ref 5–15)
AST SERPL-CCNC: 26 U/L (ref 14–36)
BASOPHILS # BLD AUTO: 0.02 10*3/MM3 (ref 0–0.2)
BASOPHILS NFR BLD AUTO: 0.2 % (ref 0–2)
BILIRUB SERPL-MCNC: 0.5 MG/DL (ref 0.2–1.3)
BUN BLD-MCNC: 7 MG/DL (ref 7–21)
BUN/CREAT SERPL: 11.1 (ref 7–25)
CALCIUM SPEC-SCNC: 8.8 MG/DL (ref 8.4–10.2)
CHLORIDE SERPL-SCNC: 104 MMOL/L (ref 95–110)
CO2 SERPL-SCNC: 24 MMOL/L (ref 22–31)
CREAT BLD-MCNC: 0.63 MG/DL (ref 0.5–1)
DEPRECATED RDW RBC AUTO: 47 FL (ref 36.4–46.3)
EOSINOPHIL # BLD AUTO: 0.14 10*3/MM3 (ref 0–0.7)
EOSINOPHIL NFR BLD AUTO: 1.5 % (ref 0–7)
ERYTHROCYTE [DISTWIDTH] IN BLOOD BY AUTOMATED COUNT: 15.8 % (ref 11.5–14.5)
GFR SERPL CREATININE-BSD FRML MDRD: 114 ML/MIN/1.73 (ref 60–165)
GLOBULIN UR ELPH-MCNC: 3.4 GM/DL (ref 2.3–3.5)
GLUCOSE BLD-MCNC: 87 MG/DL (ref 60–100)
HCT VFR BLD AUTO: 35.8 % (ref 35–45)
HGB BLD-MCNC: 12 G/DL (ref 12–15.5)
IMM GRANULOCYTES # BLD: 0.09 10*3/MM3 (ref 0–0.02)
IMM GRANULOCYTES NFR BLD: 1 % (ref 0–0.5)
LYMPHOCYTES # BLD AUTO: 1.36 10*3/MM3 (ref 0.6–4.2)
LYMPHOCYTES NFR BLD AUTO: 14.9 % (ref 10–50)
MCH RBC QN AUTO: 27 PG (ref 26.5–34)
MCHC RBC AUTO-ENTMCNC: 33.5 G/DL (ref 31.4–36)
MCV RBC AUTO: 80.4 FL (ref 80–98)
MONOCYTES # BLD AUTO: 0.75 10*3/MM3 (ref 0–0.9)
MONOCYTES NFR BLD AUTO: 8.2 % (ref 0–12)
NEUTROPHILS # BLD AUTO: 6.76 10*3/MM3 (ref 2–8.6)
NEUTROPHILS NFR BLD AUTO: 74.2 % (ref 37–80)
PLATELET # BLD AUTO: 218 10*3/MM3 (ref 150–450)
PMV BLD AUTO: 9.4 FL (ref 8–12)
POTASSIUM BLD-SCNC: 3.7 MMOL/L (ref 3.5–5.1)
PROT SERPL-MCNC: 6.6 G/DL (ref 6.3–8.6)
RBC # BLD AUTO: 4.45 10*6/MM3 (ref 3.77–5.16)
SODIUM BLD-SCNC: 137 MMOL/L (ref 137–145)
WBC NRBC COR # BLD: 9.12 10*3/MM3 (ref 3.2–9.8)

## 2017-12-04 PROCEDURE — 80053 COMPREHEN METABOLIC PANEL: CPT | Performed by: OBSTETRICS & GYNECOLOGY

## 2017-12-04 PROCEDURE — 99212 OFFICE O/P EST SF 10 MIN: CPT | Performed by: OBSTETRICS & GYNECOLOGY

## 2017-12-04 PROCEDURE — 36415 COLL VENOUS BLD VENIPUNCTURE: CPT | Performed by: OBSTETRICS & GYNECOLOGY

## 2017-12-04 PROCEDURE — 85025 COMPLETE CBC W/AUTO DIFF WBC: CPT | Performed by: OBSTETRICS & GYNECOLOGY

## 2017-12-04 NOTE — PROGRESS NOTES
CC:  visit    HPI:  Doing well.  Was seen on L&D over weekend for elevated BPs, states her BPs were normal on L&D and UA negative for protein, was discharge home.  Patient states BPs at home have been high sometimes but always comes down.  Mild headache at times, but improves with tylenol, right now is a 2/10, however, didn't get much sleep due to baby.      +FM.  No LOF or vb.  Occasional ctx but nothing regular.      PE - see vitals  NST - 130 baseline, +accels, no decels, moderate variability, category 1 strip reactive    A/P: 27 yo  @ 34w6d by 7w orquidea presents for  visit.   1. Continue routine prenatal care  - Encouraged PNV  - PTL precautions  - O+, RI, HIV neg, RPR NR, HbsAg neg, GC/CT neg/neg, elevated 1 hr and normal 3 hour  - Quad screen negative    - RTC in 2 days for NST      2. Hypothyroidism  - TSH 0.96 on 10/24, managed by endocrine  - Currently on 125mcg daily with one extra pill a week      3. S>D  - IG 63%tile      4. PreE without severe features  - Based on BPs and 24UTP of 400mg  - Precautions given; will repeat CBC and CMP today   - NST twice weekly, IG with BPP in 2 weeks  - IOL scheduled for 37 weeks ()      Jayne P. Friday

## 2017-12-05 ENCOUNTER — OFFICE VISIT (OUTPATIENT)
Dept: ENDOCRINOLOGY | Facility: CLINIC | Age: 27
End: 2017-12-05

## 2017-12-05 VITALS
HEIGHT: 55 IN | BODY MASS INDEX: 62.48 KG/M2 | SYSTOLIC BLOOD PRESSURE: 126 MMHG | HEART RATE: 98 BPM | DIASTOLIC BLOOD PRESSURE: 80 MMHG | WEIGHT: 270 LBS

## 2017-12-05 DIAGNOSIS — E55.9 VITAMIN D DEFICIENCY: ICD-10-CM

## 2017-12-05 DIAGNOSIS — E06.3 HASHIMOTO'S THYROIDITIS: Primary | ICD-10-CM

## 2017-12-05 PROCEDURE — 99213 OFFICE O/P EST LOW 20 MIN: CPT | Performed by: NURSE PRACTITIONER

## 2017-12-05 NOTE — PROGRESS NOTES
Subjective    Jerrica Marquez is a 27 y.o. female. she is here today for follow-up.    History of Present Illness       26 year old comes for follow up      reason - hypothyroidism,due to Hashimoto's     timing - constant     quality - not controlled     severity - moderate     symptoms - 60 + lb weight gain, fatigue, hair loss, constipation, arthralgias     alleviating - levothyroxine 100 mcgs daily and on this dose TSH is 5     Currently pregnant 35 weeks     --     obesity   despite exercise  following 1800 calorie diet     --     h o vit D Deficiency, not on supplements                Evaluation history:  TSH   Date Value Ref Range Status   2017 1.710 0.460 - 4.680 mIU/mL Final     Free T4   Date Value Ref Range Status   2017 1.01 0.78 - 2.19 ng/dL Final       Current medications:  Current Outpatient Prescriptions   Medication Sig Dispense Refill   • levothyroxine (SYNTHROID) 125 MCG tablet Take one tablet daily with one extra tablet on Monday and Thursday. 40 tablet 11   • Prenatal Vit-Fe Fumarate-FA (PRENATAL VITAMIN PO) Take  by mouth Daily.       No current facility-administered medications for this visit.        The following portions of the patient's history were reviewed and updated as appropriate:   Past Medical History:   Diagnosis Date   • Abdominal pain     generalized cramping after eating wheat type products   • Acquired hypothyroidism     Secondary to Hashimoto's thyroiditis   • Dysmenorrhea    • Endogenous obesity    • Excessive or frequent menstruation    • Hashimoto's thyroiditis    • Headache    • Hypertension 2017    gestational hypertension   • Myopia    • Vitamin D deficiency      Past Surgical History:   Procedure Laterality Date   •  SECTION N/A 2017    Procedure:  SECTION PRIMARY;  Surgeon: Jayne Thibodeaux MD;  Location: [facility] LABOR DELIVERY;  Service:    • INJECTION OF MEDICATION  2013    Depo Provera (medroxyprogesterone acetate)   •  INJECTION OF MEDICATION  2012    Kenalog x2   • INJECTION OF MEDICATION  2015    Toradol   • INJECTION OF MEDICATION  2015    Zofran   • WISDOM TOOTH EXTRACTION       Family History   Problem Relation Age of Onset   • Asthma Other    • Hypertension Other    • Other Other      Thyroid Disorder, Depressive Disorder, Smoking Tobacco, Anxiety   • Rheum arthritis Mother    • Hypertension Father    • Heart disease Maternal Grandfather    • Hypertension Maternal Grandfather      OB History      Para Term  AB Living    1 1 1   1    SAB TAB Ectopic Multiple Live Births       0 1        Allergies   Allergen Reactions   • Cefzil [Cefprozil] Hives   • Celexa [Citalopram]      Patient states that she is not allergic to this med. It causes fatigue   • Influenza Vaccines      Migraines/ vomiting has reaction 2015- not in past      Social History     Social History   • Marital status:      Spouse name: N/A   • Number of children: N/A   • Years of education: N/A     Social History Main Topics   • Smoking status: Never Smoker   • Smokeless tobacco: Never Used   • Alcohol use No   • Drug use: No   • Sexual activity: Yes     Partners: Male     Other Topics Concern   • None     Social History Narrative       Review of Systems  Review of Systems   Constitutional: Negative for activity change, appetite change, diaphoresis and fatigue.   HENT: Negative for congestion, dental problem, facial swelling, sneezing, sore throat, tinnitus, trouble swallowing and voice change.    Eyes: Negative for photophobia, pain, discharge, redness, itching and visual disturbance.   Respiratory: Negative for apnea, cough, choking, chest tightness and shortness of breath.    Cardiovascular: Negative for chest pain, palpitations and leg swelling.   Gastrointestinal: Negative for abdominal distention, abdominal pain, constipation, diarrhea, nausea and vomiting.   Endocrine: Negative for cold intolerance, heat intolerance,  "polydipsia, polyphagia and polyuria.   Genitourinary: Negative for difficulty urinating, dysuria, frequency, hematuria and urgency.   Musculoskeletal: Negative for arthralgias, back pain, gait problem, joint swelling, myalgias, neck pain and neck stiffness.   Skin: Negative for color change, pallor, rash and wound.   Allergic/Immunologic: Negative for environmental allergies.   Neurological: Negative for dizziness, tremors, weakness, light-headedness, numbness and headaches.   Hematological: Negative for adenopathy. Does not bruise/bleed easily.   Psychiatric/Behavioral: Negative for agitation, behavioral problems, confusion and sleep disturbance.        Objective    /80 (BP Location: Right arm, Patient Position: Sitting, Cuff Size: Adult)  Pulse 98  Ht 65 cm (25.59\")  Wt 122 kg (270 lb)  LMP 03/25/2017  .86 kg/m2  Physical Exam   Constitutional: She is oriented to person, place, and time. She appears well-developed and well-nourished. No distress.   HENT:   Head: Normocephalic and atraumatic.   Right Ear: External ear normal.   Left Ear: External ear normal.   Nose: Nose normal.   Eyes: Conjunctivae and EOM are normal. Pupils are equal, round, and reactive to light.   Neck: Normal range of motion. Neck supple. No tracheal deviation present. No thyromegaly present.   Cardiovascular: Normal rate, regular rhythm and normal heart sounds.    No murmur heard.  Pulmonary/Chest: Effort normal and breath sounds normal. No respiratory distress. She has no wheezes.   Abdominal: Soft. Bowel sounds are normal. There is no tenderness. There is no rebound and no guarding.   Musculoskeletal: Normal range of motion. She exhibits no edema, tenderness or deformity.   Neurological: She is alert and oriented to person, place, and time. No cranial nerve deficit.   Skin: Skin is warm and dry. No rash noted.   Psychiatric: She has a normal mood and affect. Her behavior is normal. Judgment and thought content normal. "       Lab Review  Lab Results   Component Value Date    TSH 1.710 11/22/2017     Lab Results   Component Value Date    FREET4 1.01 07/21/2017        Assessment/Plan      1. Hashimoto's thyroiditis    2. Vitamin D deficiency    . This diagnosis was discussed and reviewed with the patient including the advantages of drug therapy.     1. Orders placed during this encounter include:  No orders of the defined types were placed in this encounter.      Medications prescribed:  Outpatient Encounter Prescriptions as of 12/5/2017   Medication Sig Dispense Refill   • levothyroxine (SYNTHROID) 125 MCG tablet Take one tablet daily with one extra tablet on Monday and Thursday. 40 tablet 11   • Prenatal Vit-Fe Fumarate-FA (PRENATAL VITAMIN PO) Take  by mouth Daily.     • [DISCONTINUED] acetaminophen (TYLENOL) 500 MG tablet Take 500 mg by mouth Every 6 (Six) Hours As Needed for Mild Pain .     • [DISCONTINUED] docusate sodium (COLACE) 100 MG capsule Take 1 capsule by mouth Daily As Needed for Constipation. 30 capsule 10   • [DISCONTINUED] ferrous gluconate (FERGON) 324 MG tablet Take 1 tablet by mouth 3 (Three) Times a Day With Meals. 90 tablet 8   • [DISCONTINUED] promethazine (PHENERGAN) 12.5 MG tablet Take 1 tablet by mouth Every 6 (Six) Hours As Needed for Nausea or Vomiting. 30 tablet 2     No facility-administered encounter medications on file as of 12/5/2017.      Plan Details  Hypothyroidism        on Levothyroxine 125 mcgs one daily     June 2015     TSH - 2.07  no change in dosage     ( previouly on levothyroxine and cytomel did not like the cytomel)     repeat labs today     Will call with results         Variability TSH -- submit for brand name synthroid      When she becomes pregnant she is to call the office immediately        Levothyroxine 125 mcg one daily except Monday and Thursday take 2 tablet      Lab Results   Component Value Date    TSH 1.710 11/22/2017     Levothyroxine 125 mcg one daily except Thursdays take  2           ====     Vit D Def , start 50 th q 2 w     Was taking     She ran out about one month ago      ====     B12 nl      --------------     stomach pain after eating breads and other foods     will check for celiac --negative            4. Return in about 4 months (around 4/5/2018) for Recheck.

## 2017-12-06 ENCOUNTER — ROUTINE PRENATAL (OUTPATIENT)
Dept: OBSTETRICS AND GYNECOLOGY | Facility: CLINIC | Age: 27
End: 2017-12-06

## 2017-12-06 VITALS — DIASTOLIC BLOOD PRESSURE: 85 MMHG | SYSTOLIC BLOOD PRESSURE: 135 MMHG | WEIGHT: 274 LBS | BODY MASS INDEX: 294.16 KG/M2

## 2017-12-06 DIAGNOSIS — Z3A.35 35 WEEKS GESTATION OF PREGNANCY: ICD-10-CM

## 2017-12-06 DIAGNOSIS — O99.283 HYPOTHYROIDISM AFFECTING PREGNANCY IN THIRD TRIMESTER: ICD-10-CM

## 2017-12-06 DIAGNOSIS — O14.03 MILD PREECLAMPSIA, THIRD TRIMESTER: Primary | ICD-10-CM

## 2017-12-06 DIAGNOSIS — E03.9 HYPOTHYROIDISM AFFECTING PREGNANCY IN THIRD TRIMESTER: ICD-10-CM

## 2017-12-06 PROCEDURE — 99212 OFFICE O/P EST SF 10 MIN: CPT | Performed by: OBSTETRICS & GYNECOLOGY

## 2017-12-06 NOTE — PROGRESS NOTES
visit    HPI - States she is actually feeling better today.  No HA.  Has been resting at home.  Occasional ctx, worse at night but still nothing regular. +FM.  No LOF or vb.      PE - see vitals  NST - 130 baseline, +accels, no decels, moderate variability, category 1 strip reactive     A/P: 25 yo  @ 35w1d by 7w orquidea presents for  visit.   1. Continue routine prenatal care  - Encouraged PNV  - PTL precautions  - O+, RI, HIV neg, RPR NR, HbsAg neg, GC/CT neg/neg, elevated 1 hr and normal 3 hour  - Quad screen negative    - RTC on Monday for NST      2. Hypothyroidism  - TSH 0.96 on 10/24, managed by endocrine  - Currently on 125mcg daily with one extra pill a week      3. S>D  - IG 63%tile      4. PreE without severe features  - Based on BPs and 24UTP of 400mg  - Precautions given; PIH lab wnl this week  - NST twice weekly, IG with BPP next week  - IOL scheduled for 37 weeks ()      Jayne BYRNE Friday

## 2017-12-11 ENCOUNTER — ROUTINE PRENATAL (OUTPATIENT)
Dept: OBSTETRICS AND GYNECOLOGY | Facility: CLINIC | Age: 27
End: 2017-12-11

## 2017-12-11 VITALS — BODY MASS INDEX: 295.23 KG/M2 | DIASTOLIC BLOOD PRESSURE: 86 MMHG | SYSTOLIC BLOOD PRESSURE: 141 MMHG | WEIGHT: 275 LBS

## 2017-12-11 DIAGNOSIS — O14.03 MILD PREECLAMPSIA, THIRD TRIMESTER: ICD-10-CM

## 2017-12-11 DIAGNOSIS — O99.283 HYPOTHYROIDISM AFFECTING PREGNANCY IN THIRD TRIMESTER: Primary | ICD-10-CM

## 2017-12-11 DIAGNOSIS — E03.9 HYPOTHYROIDISM AFFECTING PREGNANCY IN THIRD TRIMESTER: Primary | ICD-10-CM

## 2017-12-11 DIAGNOSIS — Z3A.35 35 WEEKS GESTATION OF PREGNANCY: ICD-10-CM

## 2017-12-11 PROCEDURE — 87653 STREP B DNA AMP PROBE: CPT | Performed by: OBSTETRICS & GYNECOLOGY

## 2017-12-11 PROCEDURE — 99212 OFFICE O/P EST SF 10 MIN: CPT | Performed by: OBSTETRICS & GYNECOLOGY

## 2017-12-12 LAB — GROUP B STREP, DNA: POSITIVE

## 2017-12-13 ENCOUNTER — ROUTINE PRENATAL (OUTPATIENT)
Dept: OBSTETRICS AND GYNECOLOGY | Facility: CLINIC | Age: 27
End: 2017-12-13

## 2017-12-13 VITALS — BODY MASS INDEX: 292.01 KG/M2 | DIASTOLIC BLOOD PRESSURE: 78 MMHG | SYSTOLIC BLOOD PRESSURE: 134 MMHG | WEIGHT: 272 LBS

## 2017-12-13 DIAGNOSIS — O99.213 OBESITY AFFECTING PREGNANCY IN THIRD TRIMESTER: ICD-10-CM

## 2017-12-13 DIAGNOSIS — Z3A.36 36 WEEKS GESTATION OF PREGNANCY: ICD-10-CM

## 2017-12-13 DIAGNOSIS — E03.9 HYPOTHYROIDISM AFFECTING PREGNANCY IN THIRD TRIMESTER: ICD-10-CM

## 2017-12-13 DIAGNOSIS — O36.63X1: Primary | ICD-10-CM

## 2017-12-13 DIAGNOSIS — O14.03 MILD PREECLAMPSIA, THIRD TRIMESTER: ICD-10-CM

## 2017-12-13 DIAGNOSIS — O99.283 HYPOTHYROIDISM AFFECTING PREGNANCY IN THIRD TRIMESTER: ICD-10-CM

## 2017-12-13 PROCEDURE — 99213 OFFICE O/P EST LOW 20 MIN: CPT | Performed by: NURSE PRACTITIONER

## 2017-12-13 NOTE — PROGRESS NOTES
visit     Getting HA's if BP is high, also now has nausea with HA. Is able to get BP back to normal range by lying down but can take up to 2 hours to get back to normal. Today her BP was 150s/110 with HA and nausea; was back to normal in about 1.5 hrs. Still having irregular ctx.  No LOF or vb.  +FM.       PE - see vitals      A/P: 25 yo  @ 36w1d by 7w orquidea presents for  visit.   1. Continue routine prenatal care  - Encouraged PNV  - PTL precautions  - O+, RI, HIV neg, RPR NR, HbsAg neg, GC/CT neg/neg, elevated 1 hr and normal 3 hour; GBS today  - Quad screen negative    - RTC on Monday for final  visit      2. Hypothyroidism  - TSH 1.710 (), managed by endocrine  - Currently on 125mcg daily with one extra pill a week      3. S>D  - IG 62% FABRIZIO- 18.9cm  -BPP       4. PreE without severe features  - Based on BPs and 24UTP of 400mg  - Precautions given  - IOL scheduled for 37 weeks ()    Diagnoses and all orders for this visit:    Large for dates complicating pregnancy in third trimester, antepartum, fetus 1    Hypothyroidism affecting pregnancy in third trimester    Mild preeclampsia, third trimester    36 weeks gestation of pregnancy    Obesity affecting pregnancy in third trimester

## 2017-12-18 ENCOUNTER — ROUTINE PRENATAL (OUTPATIENT)
Dept: OBSTETRICS AND GYNECOLOGY | Facility: CLINIC | Age: 27
End: 2017-12-18

## 2017-12-18 VITALS — WEIGHT: 273 LBS | BODY MASS INDEX: 293.08 KG/M2 | SYSTOLIC BLOOD PRESSURE: 137 MMHG | DIASTOLIC BLOOD PRESSURE: 79 MMHG

## 2017-12-18 DIAGNOSIS — Z3A.36 36 WEEKS GESTATION OF PREGNANCY: ICD-10-CM

## 2017-12-18 DIAGNOSIS — O14.03 MILD PREECLAMPSIA, THIRD TRIMESTER: Primary | ICD-10-CM

## 2017-12-18 PROCEDURE — 99212 OFFICE O/P EST SF 10 MIN: CPT | Performed by: OBSTETRICS & GYNECOLOGY

## 2017-12-18 RX ORDER — DIPHENHYDRAMINE HYDROCHLORIDE 50 MG/ML
25 INJECTION INTRAMUSCULAR; INTRAVENOUS NIGHTLY PRN
Status: CANCELLED | OUTPATIENT
Start: 2017-12-18

## 2017-12-18 RX ORDER — CARBOPROST TROMETHAMINE 250 UG/ML
250 INJECTION, SOLUTION INTRAMUSCULAR AS NEEDED
Status: CANCELLED | OUTPATIENT
Start: 2017-12-18

## 2017-12-18 RX ORDER — IBUPROFEN 200 MG
600 TABLET ORAL EVERY 6 HOURS PRN
Status: CANCELLED | OUTPATIENT
Start: 2017-12-18

## 2017-12-18 RX ORDER — SODIUM CHLORIDE, SODIUM LACTATE, POTASSIUM CHLORIDE, CALCIUM CHLORIDE 600; 310; 30; 20 MG/100ML; MG/100ML; MG/100ML; MG/100ML
125 INJECTION, SOLUTION INTRAVENOUS CONTINUOUS
Status: CANCELLED | OUTPATIENT
Start: 2017-12-18

## 2017-12-18 RX ORDER — SODIUM CHLORIDE 0.9 % (FLUSH) 0.9 %
1-10 SYRINGE (ML) INJECTION AS NEEDED
Status: CANCELLED | OUTPATIENT
Start: 2017-12-18

## 2017-12-18 RX ORDER — HYDROCODONE BITARTRATE AND ACETAMINOPHEN 5; 325 MG/1; MG/1
1 TABLET ORAL EVERY 4 HOURS PRN
Status: CANCELLED | OUTPATIENT
Start: 2017-12-18 | End: 2017-12-28

## 2017-12-18 RX ORDER — OXYTOCIN/0.9 % SODIUM CHLORIDE 30/500 ML
2-20 PLASTIC BAG, INJECTION (ML) INTRAVENOUS
Status: CANCELLED | OUTPATIENT
Start: 2017-12-18

## 2017-12-18 RX ORDER — LIDOCAINE HYDROCHLORIDE 10 MG/ML
5 INJECTION, SOLUTION INFILTRATION; PERINEURAL AS NEEDED
Status: CANCELLED | OUTPATIENT
Start: 2017-12-18

## 2017-12-18 RX ORDER — METHYLERGONOVINE MALEATE 0.2 MG/ML
200 INJECTION INTRAVENOUS ONCE AS NEEDED
Status: CANCELLED | OUTPATIENT
Start: 2017-12-18

## 2017-12-18 RX ORDER — DIPHENHYDRAMINE HCL 25 MG
25 CAPSULE ORAL NIGHTLY PRN
Status: CANCELLED | OUTPATIENT
Start: 2017-12-18

## 2017-12-18 RX ORDER — HYDROCODONE BITARTRATE AND ACETAMINOPHEN 10; 325 MG/1; MG/1
2 TABLET ORAL EVERY 4 HOURS PRN
Status: CANCELLED | OUTPATIENT
Start: 2017-12-18 | End: 2017-12-28

## 2017-12-18 RX ORDER — MISOPROSTOL 100 UG/1
800 TABLET ORAL AS NEEDED
Status: CANCELLED | OUTPATIENT
Start: 2017-12-18

## 2017-12-19 ENCOUNTER — HOSPITAL ENCOUNTER (OUTPATIENT)
Dept: LABOR AND DELIVERY | Facility: HOSPITAL | Age: 27
Discharge: HOME | End: 2017-12-19

## 2017-12-19 ENCOUNTER — HOSPITAL ENCOUNTER (INPATIENT)
Facility: HOSPITAL | Age: 27
LOS: 4 days | Discharge: HOME | End: 2017-12-23
Attending: OBSTETRICS & GYNECOLOGY | Admitting: OBSTETRICS & GYNECOLOGY

## 2017-12-19 DIAGNOSIS — O14.03 MILD PREECLAMPSIA, THIRD TRIMESTER: ICD-10-CM

## 2017-12-19 DIAGNOSIS — O14.93 PRE-ECLAMPSIA IN THIRD TRIMESTER: ICD-10-CM

## 2017-12-19 DIAGNOSIS — O14.03 MILD PRE-ECLAMPSIA IN THIRD TRIMESTER: Primary | ICD-10-CM

## 2017-12-19 PROBLEM — O14.00 MILD PREECLAMPSIA: Status: ACTIVE | Noted: 2017-12-19

## 2017-12-19 LAB
ABO GROUP BLD: NORMAL
AMPHET+METHAMPHET UR QL: NEGATIVE
BARBITURATES UR QL SCN: NEGATIVE
BENZODIAZ UR QL SCN: NEGATIVE
BLD GP AB SCN SERPL QL: NEGATIVE
CANNABINOIDS SERPL QL: NEGATIVE
COCAINE UR QL: NEGATIVE
DEPRECATED RDW RBC AUTO: 44.9 FL (ref 36.4–46.3)
ERYTHROCYTE [DISTWIDTH] IN BLOOD BY AUTOMATED COUNT: 15.3 % (ref 11.5–14.5)
HCT VFR BLD AUTO: 35.5 % (ref 35–45)
HGB BLD-MCNC: 11.9 G/DL (ref 12–15.5)
Lab: NORMAL
MCH RBC QN AUTO: 26.6 PG (ref 26.5–34)
MCHC RBC AUTO-ENTMCNC: 33.5 G/DL (ref 31.4–36)
MCV RBC AUTO: 79.4 FL (ref 80–98)
METHADONE UR QL SCN: NEGATIVE
OPIATES UR QL: NEGATIVE
OXYCODONE UR QL SCN: NEGATIVE
PLATELET # BLD AUTO: 252 10*3/MM3 (ref 150–450)
PMV BLD AUTO: 9.7 FL (ref 8–12)
RBC # BLD AUTO: 4.47 10*6/MM3 (ref 3.77–5.16)
RH BLD: POSITIVE
WBC NRBC COR # BLD: 11.25 10*3/MM3 (ref 3.2–9.8)

## 2017-12-19 PROCEDURE — 80307 DRUG TEST PRSMV CHEM ANLYZR: CPT | Performed by: OBSTETRICS & GYNECOLOGY

## 2017-12-19 PROCEDURE — 85027 COMPLETE CBC AUTOMATED: CPT | Performed by: OBSTETRICS & GYNECOLOGY

## 2017-12-19 PROCEDURE — 86901 BLOOD TYPING SEROLOGIC RH(D): CPT | Performed by: OBSTETRICS & GYNECOLOGY

## 2017-12-19 PROCEDURE — 86850 RBC ANTIBODY SCREEN: CPT | Performed by: OBSTETRICS & GYNECOLOGY

## 2017-12-19 PROCEDURE — 86900 BLOOD TYPING SEROLOGIC ABO: CPT | Performed by: OBSTETRICS & GYNECOLOGY

## 2017-12-19 RX ORDER — LABETALOL HYDROCHLORIDE 5 MG/ML
20 INJECTION, SOLUTION INTRAVENOUS ONCE
Status: COMPLETED | OUTPATIENT
Start: 2017-12-19 | End: 2017-12-19

## 2017-12-19 RX ORDER — DIPHENHYDRAMINE HYDROCHLORIDE 50 MG/ML
25 INJECTION INTRAMUSCULAR; INTRAVENOUS NIGHTLY PRN
Status: DISCONTINUED | OUTPATIENT
Start: 2017-12-19 | End: 2017-12-21

## 2017-12-19 RX ORDER — SODIUM CHLORIDE, SODIUM LACTATE, POTASSIUM CHLORIDE, CALCIUM CHLORIDE 600; 310; 30; 20 MG/100ML; MG/100ML; MG/100ML; MG/100ML
INJECTION, SOLUTION INTRAVENOUS
Status: COMPLETED
Start: 2017-12-19 | End: 2017-12-19

## 2017-12-19 RX ORDER — OXYTOCIN/0.9 % SODIUM CHLORIDE 30/500 ML
2-20 PLASTIC BAG, INJECTION (ML) INTRAVENOUS
Status: DISCONTINUED | OUTPATIENT
Start: 2017-12-19 | End: 2017-12-19

## 2017-12-19 RX ORDER — LABETALOL 200 MG/1
200 TABLET, FILM COATED ORAL EVERY 12 HOURS SCHEDULED
Status: DISCONTINUED | OUTPATIENT
Start: 2017-12-19 | End: 2017-12-23 | Stop reason: HOSPADM

## 2017-12-19 RX ORDER — LIDOCAINE HYDROCHLORIDE 10 MG/ML
5 INJECTION, SOLUTION INFILTRATION; PERINEURAL AS NEEDED
Status: DISCONTINUED | OUTPATIENT
Start: 2017-12-19 | End: 2017-12-21

## 2017-12-19 RX ORDER — SODIUM CHLORIDE, SODIUM LACTATE, POTASSIUM CHLORIDE, CALCIUM CHLORIDE 600; 310; 30; 20 MG/100ML; MG/100ML; MG/100ML; MG/100ML
125 INJECTION, SOLUTION INTRAVENOUS CONTINUOUS
Status: DISCONTINUED | OUTPATIENT
Start: 2017-12-19 | End: 2017-12-21 | Stop reason: HOSPADM

## 2017-12-19 RX ORDER — SODIUM CHLORIDE 0.9 % (FLUSH) 0.9 %
1-10 SYRINGE (ML) INJECTION AS NEEDED
Status: DISCONTINUED | OUTPATIENT
Start: 2017-12-19 | End: 2017-12-21

## 2017-12-19 RX ORDER — DIPHENHYDRAMINE HCL 25 MG
25 CAPSULE ORAL NIGHTLY PRN
Status: DISCONTINUED | OUTPATIENT
Start: 2017-12-19 | End: 2017-12-21

## 2017-12-19 RX ORDER — OXYTOCIN/0.9 % SODIUM CHLORIDE 30/500 ML
2-20 PLASTIC BAG, INJECTION (ML) INTRAVENOUS
Status: DISCONTINUED | OUTPATIENT
Start: 2017-12-20 | End: 2017-12-21

## 2017-12-19 RX ADMIN — VANCOMYCIN HYDROCHLORIDE 1 G: 1 INJECTION, POWDER, LYOPHILIZED, FOR SOLUTION INTRAVENOUS at 19:01

## 2017-12-19 RX ADMIN — SODIUM CHLORIDE, POTASSIUM CHLORIDE, SODIUM LACTATE AND CALCIUM CHLORIDE 125 ML/HR: 600; 310; 30; 20 INJECTION, SOLUTION INTRAVENOUS at 19:23

## 2017-12-19 RX ADMIN — SODIUM CHLORIDE, POTASSIUM CHLORIDE, SODIUM LACTATE AND CALCIUM CHLORIDE 1000 ML: 600; 310; 30; 20 INJECTION, SOLUTION INTRAVENOUS at 17:05

## 2017-12-19 RX ADMIN — LABETALOL HYDROCHLORIDE 200 MG: 200 TABLET, FILM COATED ORAL at 19:10

## 2017-12-19 RX ADMIN — LABETALOL HYDROCHLORIDE 20 MG: 5 INJECTION, SOLUTION INTRAVENOUS at 18:58

## 2017-12-19 RX ADMIN — DINOPROSTONE 10 MG: 10 INSERT VAGINAL at 17:15

## 2017-12-19 NOTE — PROGRESS NOTES
visit    No headaches over the past few days.  No CP, SOB, RUQ pain, or changes in visions.  Has been having some contractions.  +FM.  No LOF or vb.  Scheduled for induction tomorrow.     PE - see vitals  NST - 130 baseline, +accels, no decels, moderate variability, reactive NST    A/P: 28 yo  @ 36w6d presents for  visit.   1. PreE without SF  - Scheduled for IOL tomorrow with cervidil.  Precautions given.      Jayne BYRNE Friday

## 2017-12-19 NOTE — H&P
History and physical    CC: IOL    HPI:  28 yo  @ 37w presents for IOL 2/2 PreE without SF.  Patient denies HA, CP, SOB, RUQ pain, and changes in vision.  Occasional ctx.  No LOF or vb. +FM.     Pregnancy complicated by PreE without SF and hypothyroidism.      OB History    Para Term  AB Living   1        SAB TAB Ectopic Multiple Live Births             # Outcome Date GA Lbr Ari/2nd Weight Sex Delivery Anes PTL Lv   1 Current                 Past Medical History:   Diagnosis Date   • Abdominal pain     generalized cramping after eating wheat type products   • Acquired hypothyroidism     Secondary to Hashimoto's thyroiditis   • Dysmenorrhea    • Endogenous obesity    • Excessive or frequent menstruation    • Hashimoto's thyroiditis    • Headache    • Hypertension 2017    gestational hypertension   • Myopia    • Vitamin D deficiency      Past Surgical History:   Procedure Laterality Date   • INJECTION OF MEDICATION  2013    Depo Provera (medroxyprogesterone acetate)   • INJECTION OF MEDICATION  2012    Kenalog x2   • INJECTION OF MEDICATION  2015    Toradol   • INJECTION OF MEDICATION  2015    Zofran   • WISDOM TOOTH EXTRACTION       Social History     Social History   • Marital status:      Spouse name: N/A   • Number of children: N/A   • Years of education: N/A     Occupational History   • Not on file.     Social History Main Topics   • Smoking status: Never Smoker   • Smokeless tobacco: Never Used   • Alcohol use No   • Drug use: No   • Sexual activity: Yes     Partners: Male     Other Topics Concern   • Not on file     Social History Narrative     Family History   Problem Relation Age of Onset   • Asthma Other    • Hypertension Other    • Other Other      Thyroid Disorder, Depressive Disorder, Smoking Tobacco, Anxiety   • Rheum arthritis Mother    • Hypertension Father    • Heart disease Maternal Grandfather    • Hypertension Maternal Grandfather      No  "current facility-administered medications on file prior to encounter.      Current Outpatient Prescriptions on File Prior to Encounter   Medication Sig   • acetaminophen (TYLENOL) 500 MG tablet Take 500 mg by mouth Every 6 (Six) Hours As Needed for Mild Pain .   • docusate sodium (COLACE) 100 MG capsule Take 1 capsule by mouth Daily As Needed for Constipation.   • ferrous gluconate (FERGON) 324 MG tablet Take 1 tablet by mouth 3 (Three) Times a Day With Meals.   • levothyroxine (SYNTHROID) 125 MCG tablet Take one tablet daily with one extra tablet on Monday and Thursday.   • Prenatal Vit-Fe Fumarate-FA (PRENATAL VITAMIN PO) Take  by mouth Daily.   • promethazine (PHENERGAN) 12.5 MG tablet Take 1 tablet by mouth Every 6 (Six) Hours As Needed for Nausea or Vomiting.     Allergies   Allergen Reactions   • Cefzil [Cefprozil] Hives   • Celexa [Citalopram]      Patient states that she is not allergic to this med. It causes fatigue   • Influenza Vaccines      Migraines/ vomiting has reaction - not in past      ROS - comprehensive ROS preformed and all negative    Vitals:    17 1619   BP: 176/86   BP Location: Right arm   Patient Position: Lying   Pulse: 108   Resp: 18   Temp: 98.3 °F (36.8 °C)   TempSrc: Oral   SpO2: 97%   Weight: 124 kg (273 lb)   Height: 165.1 cm (65\")     General - sitting in bed, AAOx3  Abd - soft, nttp, gravid  Ext - no CT bilaterally     FHT - 135 baseline, +accels, no decels, moderate variability, category 1 strip  [place] - irregular ctx    A/P: 28 yo  @ 37w admitted for IOL 2/2 PreE without SF.   1. IOL - will place cervidil, will remove 12 hours after placement or earlier.   2. PreE without SF - BP elevated on admission, will continue to cycle, if consistently severe range will start mag for SF and will start anti-hypertensives.   3. FHT category 1 strip  4. GBS positive - PCN allergic and no sensitivities run; will do vancomycin for ppx  5. Epidural prn    Jayne P. Friday   "

## 2017-12-20 ENCOUNTER — ANESTHESIA EVENT (OUTPATIENT)
Dept: LABOR AND DELIVERY | Facility: HOSPITAL | Age: 27
End: 2017-12-20

## 2017-12-20 ENCOUNTER — ANESTHESIA (OUTPATIENT)
Dept: LABOR AND DELIVERY | Facility: HOSPITAL | Age: 27
End: 2017-12-20

## 2017-12-20 LAB
ALBUMIN SERPL-MCNC: 3.2 G/DL (ref 3.4–4.8)
ALBUMIN/GLOB SERPL: 1 G/DL (ref 1.1–1.8)
ALP SERPL-CCNC: 164 U/L (ref 38–126)
ALT SERPL W P-5'-P-CCNC: 32 U/L (ref 9–52)
ANION GAP SERPL CALCULATED.3IONS-SCNC: 8 MMOL/L (ref 5–15)
AST SERPL-CCNC: 26 U/L (ref 14–36)
BILIRUB SERPL-MCNC: 0.6 MG/DL (ref 0.2–1.3)
BUN BLD-MCNC: 7 MG/DL (ref 7–21)
BUN/CREAT SERPL: 11.5 (ref 7–25)
CALCIUM SPEC-SCNC: 9.2 MG/DL (ref 8.4–10.2)
CHLORIDE SERPL-SCNC: 104 MMOL/L (ref 95–110)
CO2 SERPL-SCNC: 22 MMOL/L (ref 22–31)
CREAT BLD-MCNC: 0.61 MG/DL (ref 0.5–1)
GFR SERPL CREATININE-BSD FRML MDRD: 119 ML/MIN/1.73 (ref 71–165)
GLOBULIN UR ELPH-MCNC: 3.1 GM/DL (ref 2.3–3.5)
GLUCOSE BLD-MCNC: 77 MG/DL (ref 60–100)
POTASSIUM BLD-SCNC: 4.3 MMOL/L (ref 3.5–5.1)
PROT SERPL-MCNC: 6.3 G/DL (ref 6.3–8.6)
SODIUM BLD-SCNC: 134 MMOL/L (ref 137–145)

## 2017-12-20 PROCEDURE — 80053 COMPREHEN METABOLIC PANEL: CPT | Performed by: OBSTETRICS & GYNECOLOGY

## 2017-12-20 PROCEDURE — 3E0P7VZ INTRODUCTION OF HORMONE INTO FEMALE REPRODUCTIVE, VIA NATURAL OR ARTIFICIAL OPENING: ICD-10-PCS | Performed by: OBSTETRICS & GYNECOLOGY

## 2017-12-20 PROCEDURE — 51703 INSERT BLADDER CATH COMPLEX: CPT

## 2017-12-20 PROCEDURE — C1755 CATHETER, INTRASPINAL: HCPCS

## 2017-12-20 PROCEDURE — C1755 CATHETER, INTRASPINAL: HCPCS | Performed by: NURSE ANESTHETIST, CERTIFIED REGISTERED

## 2017-12-20 PROCEDURE — 25010000002 MAGNESIUM SULFATE PER 500 MG OF MAGNESIUM: Performed by: OBSTETRICS & GYNECOLOGY

## 2017-12-20 PROCEDURE — 25010000002 VANCOMYCIN PER 500 MG: Performed by: OBSTETRICS & GYNECOLOGY

## 2017-12-20 RX ORDER — SODIUM CHLORIDE 0.9 % (FLUSH) 0.9 %
SYRINGE (ML) INJECTION
Status: COMPLETED
Start: 2017-12-20 | End: 2017-12-20

## 2017-12-20 RX ORDER — SODIUM CHLORIDE, SODIUM LACTATE, POTASSIUM CHLORIDE, CALCIUM CHLORIDE 600; 310; 30; 20 MG/100ML; MG/100ML; MG/100ML; MG/100ML
INJECTION, SOLUTION INTRAVENOUS
Status: COMPLETED
Start: 2017-12-20 | End: 2017-12-20

## 2017-12-20 RX ORDER — MAGNESIUM SULFATE HEPTAHYDRATE 40 MG/ML
4 INJECTION, SOLUTION INTRAVENOUS ONCE
Status: COMPLETED | OUTPATIENT
Start: 2017-12-20 | End: 2017-12-20

## 2017-12-20 RX ORDER — LEVOTHYROXINE SODIUM 0.12 MG/1
125 TABLET ORAL
Status: DISCONTINUED | OUTPATIENT
Start: 2017-12-20 | End: 2017-12-23 | Stop reason: HOSPADM

## 2017-12-20 RX ADMIN — LABETALOL HYDROCHLORIDE 200 MG: 200 TABLET, FILM COATED ORAL at 18:12

## 2017-12-20 RX ADMIN — SODIUM CHLORIDE, POTASSIUM CHLORIDE, SODIUM LACTATE AND CALCIUM CHLORIDE 125 ML/HR: 600; 310; 30; 20 INJECTION, SOLUTION INTRAVENOUS at 19:48

## 2017-12-20 RX ADMIN — Medication 12 ML/HR: at 12:32

## 2017-12-20 RX ADMIN — MAGNESIUM SULFATE HEPTAHYDRATE 2 G/HR: 500 INJECTION, SOLUTION INTRAMUSCULAR; INTRAVENOUS at 20:40

## 2017-12-20 RX ADMIN — VANCOMYCIN HYDROCHLORIDE 1 G: 1 INJECTION, POWDER, LYOPHILIZED, FOR SOLUTION INTRAVENOUS at 07:01

## 2017-12-20 RX ADMIN — SODIUM CHLORIDE, POTASSIUM CHLORIDE, SODIUM LACTATE AND CALCIUM CHLORIDE 125 ML/HR: 600; 310; 30; 20 INJECTION, SOLUTION INTRAVENOUS at 17:54

## 2017-12-20 RX ADMIN — VANCOMYCIN HYDROCHLORIDE 1 G: 1 INJECTION, POWDER, LYOPHILIZED, FOR SOLUTION INTRAVENOUS at 18:50

## 2017-12-20 RX ADMIN — SODIUM CHLORIDE, POTASSIUM CHLORIDE, SODIUM LACTATE AND CALCIUM CHLORIDE 125 ML/HR: 600; 310; 30; 20 INJECTION, SOLUTION INTRAVENOUS at 05:53

## 2017-12-20 RX ADMIN — OXYTOCIN-SODIUM CHLORIDE 0.9% IV SOLN 30 UNIT/500ML 2 MILLI-UNITS/MIN: 30-0.9/5 SOLUTION at 06:26

## 2017-12-20 RX ADMIN — MAGNESIUM SULFATE HEPTAHYDRATE 4 G: 40 INJECTION, SOLUTION INTRAVENOUS at 20:00

## 2017-12-20 RX ADMIN — Medication 10 ML: at 05:56

## 2017-12-20 RX ADMIN — LEVOTHYROXINE SODIUM 125 MCG: 125 TABLET ORAL at 10:01

## 2017-12-20 RX ADMIN — LABETALOL HYDROCHLORIDE 200 MG: 200 TABLET, FILM COATED ORAL at 09:30

## 2017-12-20 NOTE — PROGRESS NOTES
Patient feels some contractions but nothing too painful.     Vitals:    17 0857 17 0912 17 0927 17 1005   BP: 134/63 140/66 146/68 139/81   BP Location:       Patient Position:       Pulse: 85 82 69 85   Resp:       Temp:       TempSrc:       SpO2:       Weight:       Height:         General - sitting up in bed, AAOx3    SVE 60/1-2 by nursing staff    FHT - 125 baseline, +accels, no decels, moderate vairability, category 1 strip  [place] - 4 ctx in 10 minutes    A/P: 26 yo  @ 37w1d admitted for IOL 2/2 PreE without SF.   1. IOL - s/p cervidil,  Pitocin currently at 20 with minimal change.  Discussed stopping pitocin and placing another cervidil vs rashid bulb and pitocin.  Patient would like to talk to  first.    2. PreE without SF - BPs mild range, PIH labs wnl  3. GBS positive - Vancomycin  4. Epiudral prn    IOL awaiting patient's decision    .      This document has been electronically signed by Jayne Thibodeaux MD on 2017 11:29 AM

## 2017-12-20 NOTE — PROGRESS NOTES
Patient states she was able to get some rest.  Mild contractions    Cervidil removed this AM.  Overnight, BPs elevated, received IV labetalol x 1 and started on Labetalol 200mg BID.  BPs normotensive to mild range.  Asympatomatic.     Vitals:    17 0641 17 0656 17 0711 17 0712   BP: 136/66 142/67 138/71 141/75   BP Location:       Patient Position:       Pulse: 76 77 81 71   Resp:       Temp:       TempSrc:       SpO2:       Weight:       Height:         General - sitting in bed, AAOx3    SVE preformed by nurse at time of cervidil removal - 1cm dilated    FHT - 130 baseline, +accels, no decels, moderate variability, category 1 strip  [place] - irregular ctx    A/P: 26 yo  @ 37w1 admitted for IOL 2/2 PreE without SF.   1. IOL - s/p cervidil, pitocin started.  Will titrate pitocin, will consider AROM when dilated 3-4cm.   2. PreE without SF- BPs elevated last night, started on oral anti-hypertensive.  If persistently elevated today will start magnesium.  Will send CMP this AM to elevated Cr and LFTs.    3. GBS positive - Vancomycin  4. Epidural prn    Continue with induction at this time.           This document has been electronically signed by Jayne Thibodeaux MD on 2017 7:29 AM

## 2017-12-20 NOTE — PROGRESS NOTES
Patient desired rashid bulb after epidural placement.     Epidural had to be placed twice.      SVE 70/2/-3 with arshid bulb placed to gravity     FHT - category 1 strip  [place] - actontractile    Will restart pitocin at 2, will continue to titrate.  Will consider AROM after rashid bulb falls out.            This document has been electronically signed by Jayne Thibodeaux MD on December 20, 2017 2:54 PM

## 2017-12-20 NOTE — ANESTHESIA PREPROCEDURE EVALUATION
Anesthesia Evaluation     Patient summary reviewed and Nursing notes reviewed          Airway   no difficulty expected  Dental      Pulmonary - normal exam   Cardiovascular - normal exam    (+) hypertension well controlled,       Neuro/Psych  GI/Hepatic/Renal/Endo    (+) obesity,  hypothyroidism,     Musculoskeletal     Abdominal   (+) obese,    Substance History      OB/GYN          Other                                        Anesthesia Plan    ASA 2 - emergent     epidural     Anesthetic plan and risks discussed with patient.    Plan discussed with CRNA.

## 2017-12-20 NOTE — ANESTHESIA PROCEDURE NOTES
Labor Epidural    Patient location during procedure: OB  Start Time: 12/20/2017 12:02 PM  Stop Time: 12/20/2017 12:31 PM  Indication:at surgeon's request  Preanesthetic Checklist  Completed: patient identified, site marked, surgical consent, pre-op evaluation, timeout performed, IV checked, risks and benefits discussed and monitors and equipment checked  Prep:  Pt Position:sitting  Sterile Tech:cap, gloves, gown, mask and sterile barrier  Prep:povidone-iodine 7.5% surgical scrub  Monitoring:blood pressure monitoring and continuous pulse oximetry  Epidural Block Procedure:  Approach:midline  Guidance:landmark technique  Location:L2-L3  Needle Type:ESO Solutionstead  Needle Gauge:18 G  Loss of Resistance Medium: saline  Paresthesia: none  Aspiration:negative  Test Dose:negative  Number of Attempts: 1  Post Assessment:  Dressing:occlusive dressing applied and secured with tape  Pt Tolerance:patient tolerated the procedure well with no apparent complications  Complications:no

## 2017-12-21 PROCEDURE — 25010000002 GENTAMICIN PER 80 MG: Performed by: OBSTETRICS & GYNECOLOGY

## 2017-12-21 PROCEDURE — 59514 CESAREAN DELIVERY ONLY: CPT | Performed by: OBSTETRICS & GYNECOLOGY

## 2017-12-21 PROCEDURE — 25010000002 PROPOFOL 10 MG/ML EMULSION: Performed by: NURSE ANESTHETIST, CERTIFIED REGISTERED

## 2017-12-21 PROCEDURE — 25010000002 FENTANYL CITRATE (PF) 100 MCG/2ML SOLUTION: Performed by: NURSE ANESTHETIST, CERTIFIED REGISTERED

## 2017-12-21 PROCEDURE — 25010000002 SUCCINYLCHOLINE PER 20 MG: Performed by: NURSE ANESTHETIST, CERTIFIED REGISTERED

## 2017-12-21 PROCEDURE — 25010000002 MAGNESIUM SULFATE PER 500 MG OF MAGNESIUM: Performed by: OBSTETRICS & GYNECOLOGY

## 2017-12-21 PROCEDURE — 88307 TISSUE EXAM BY PATHOLOGIST: CPT | Performed by: PATHOLOGY

## 2017-12-21 PROCEDURE — 94799 UNLISTED PULMONARY SVC/PX: CPT

## 2017-12-21 PROCEDURE — 88307 TISSUE EXAM BY PATHOLOGIST: CPT | Performed by: OBSTETRICS & GYNECOLOGY

## 2017-12-21 PROCEDURE — C1755 CATHETER, INTRASPINAL: HCPCS

## 2017-12-21 PROCEDURE — 25010000002 MORPHINE PER 10 MG: Performed by: NURSE ANESTHETIST, CERTIFIED REGISTERED

## 2017-12-21 PROCEDURE — 25010000002 ONDANSETRON PER 1 MG: Performed by: NURSE ANESTHETIST, CERTIFIED REGISTERED

## 2017-12-21 RX ORDER — FENTANYL CITRATE 50 UG/ML
INJECTION, SOLUTION INTRAMUSCULAR; INTRAVENOUS AS NEEDED
Status: DISCONTINUED | OUTPATIENT
Start: 2017-12-21 | End: 2017-12-21 | Stop reason: SURG

## 2017-12-21 RX ORDER — SODIUM CHLORIDE 0.9 % (FLUSH) 0.9 %
SYRINGE (ML) INJECTION
Status: DISPENSED
Start: 2017-12-21 | End: 2017-12-21

## 2017-12-21 RX ORDER — PROMETHAZINE HYDROCHLORIDE 12.5 MG/1
12.5 TABLET ORAL EVERY 6 HOURS PRN
Status: DISCONTINUED | OUTPATIENT
Start: 2017-12-21 | End: 2017-12-22 | Stop reason: HOSPADM

## 2017-12-21 RX ORDER — IBUPROFEN 600 MG/1
TABLET ORAL
Status: COMPLETED
Start: 2017-12-21 | End: 2017-12-21

## 2017-12-21 RX ORDER — PROMETHAZINE HYDROCHLORIDE 25 MG/ML
12.5 INJECTION, SOLUTION INTRAMUSCULAR; INTRAVENOUS EVERY 6 HOURS PRN
Status: DISCONTINUED | OUTPATIENT
Start: 2017-12-21 | End: 2017-12-22 | Stop reason: HOSPADM

## 2017-12-21 RX ORDER — ONDANSETRON 2 MG/ML
4 INJECTION INTRAMUSCULAR; INTRAVENOUS EVERY 6 HOURS PRN
Status: DISCONTINUED | OUTPATIENT
Start: 2017-12-21 | End: 2017-12-21 | Stop reason: SDUPTHER

## 2017-12-21 RX ORDER — NALOXONE HCL 0.4 MG/ML
0.2 VIAL (ML) INJECTION AS NEEDED
Status: DISCONTINUED | OUTPATIENT
Start: 2017-12-21 | End: 2017-12-23 | Stop reason: HOSPADM

## 2017-12-21 RX ORDER — DIPHENHYDRAMINE HYDROCHLORIDE 50 MG/ML
25 INJECTION INTRAMUSCULAR; INTRAVENOUS NIGHTLY PRN
Status: DISCONTINUED | OUTPATIENT
Start: 2017-12-21 | End: 2017-12-22 | Stop reason: HOSPADM

## 2017-12-21 RX ORDER — PROMETHAZINE HYDROCHLORIDE 12.5 MG/1
12.5 SUPPOSITORY RECTAL EVERY 6 HOURS PRN
Status: DISCONTINUED | OUTPATIENT
Start: 2017-12-21 | End: 2017-12-22 | Stop reason: HOSPADM

## 2017-12-21 RX ORDER — MISOPROSTOL 200 UG/1
800 TABLET ORAL ONCE
Status: COMPLETED | OUTPATIENT
Start: 2017-12-21 | End: 2017-12-21

## 2017-12-21 RX ORDER — EPHEDRINE SULFATE 50 MG/ML
5 INJECTION, SOLUTION INTRAVENOUS ONCE AS NEEDED
Status: DISCONTINUED | OUTPATIENT
Start: 2017-12-21 | End: 2017-12-23 | Stop reason: HOSPADM

## 2017-12-21 RX ORDER — SODIUM CHLORIDE 0.9 % (FLUSH) 0.9 %
1-10 SYRINGE (ML) INJECTION AS NEEDED
Status: DISCONTINUED | OUTPATIENT
Start: 2017-12-21 | End: 2017-12-23 | Stop reason: HOSPADM

## 2017-12-21 RX ORDER — ONDANSETRON 4 MG/1
4 TABLET, FILM COATED ORAL EVERY 6 HOURS PRN
Status: DISCONTINUED | OUTPATIENT
Start: 2017-12-21 | End: 2017-12-22 | Stop reason: HOSPADM

## 2017-12-21 RX ORDER — IBUPROFEN 600 MG/1
600 TABLET ORAL EVERY 6 HOURS PRN
Status: DISCONTINUED | OUTPATIENT
Start: 2017-12-21 | End: 2017-12-22 | Stop reason: HOSPADM

## 2017-12-21 RX ORDER — IBUPROFEN 600 MG/1
600 TABLET ORAL EVERY 6 HOURS PRN
Status: DISCONTINUED | OUTPATIENT
Start: 2017-12-21 | End: 2017-12-21

## 2017-12-21 RX ORDER — LANOLIN 100 %
OINTMENT (GRAM) TOPICAL
Status: COMPLETED
Start: 2017-12-21 | End: 2017-12-21

## 2017-12-21 RX ORDER — PRENATAL VIT/IRON FUM/FOLIC AC 27MG-0.8MG
1 TABLET ORAL DAILY
Status: DISCONTINUED | OUTPATIENT
Start: 2017-12-21 | End: 2017-12-23 | Stop reason: HOSPADM

## 2017-12-21 RX ORDER — TRISODIUM CITRATE DIHYDRATE AND CITRIC ACID MONOHYDRATE 500; 334 MG/5ML; MG/5ML
30 SOLUTION ORAL ONCE
Status: COMPLETED | OUTPATIENT
Start: 2017-12-21 | End: 2017-12-21

## 2017-12-21 RX ORDER — PRENATAL VIT/IRON FUM/FOLIC AC 27MG-0.8MG
TABLET ORAL
Status: COMPLETED
Start: 2017-12-21 | End: 2017-12-21

## 2017-12-21 RX ORDER — IBUPROFEN 600 MG/1
600 TABLET ORAL EVERY 8 HOURS PRN
Status: DISCONTINUED | OUTPATIENT
Start: 2017-12-21 | End: 2017-12-21

## 2017-12-21 RX ORDER — HYDROCODONE BITARTRATE AND ACETAMINOPHEN 10; 325 MG/1; MG/1
2 TABLET ORAL EVERY 4 HOURS PRN
Status: DISCONTINUED | OUTPATIENT
Start: 2017-12-21 | End: 2017-12-21 | Stop reason: SDUPTHER

## 2017-12-21 RX ORDER — OXYTOCIN/RINGER'S LACTATE 20/1000 ML
PLASTIC BAG, INJECTION (ML) INTRAVENOUS
Status: COMPLETED
Start: 2017-12-21 | End: 2017-12-21

## 2017-12-21 RX ORDER — LIDOCAINE HYDROCHLORIDE 10 MG/ML
INJECTION, SOLUTION INFILTRATION; PERINEURAL AS NEEDED
Status: DISCONTINUED | OUTPATIENT
Start: 2017-12-21 | End: 2017-12-21 | Stop reason: SURG

## 2017-12-21 RX ORDER — PROPOFOL 10 MG/ML
VIAL (ML) INTRAVENOUS AS NEEDED
Status: DISCONTINUED | OUTPATIENT
Start: 2017-12-21 | End: 2017-12-21 | Stop reason: SURG

## 2017-12-21 RX ORDER — DIPHENHYDRAMINE HCL 25 MG
25 CAPSULE ORAL NIGHTLY PRN
Status: DISCONTINUED | OUTPATIENT
Start: 2017-12-21 | End: 2017-12-22 | Stop reason: HOSPADM

## 2017-12-21 RX ORDER — HYDROCODONE BITARTRATE AND ACETAMINOPHEN 5; 325 MG/1; MG/1
1 TABLET ORAL EVERY 4 HOURS PRN
Status: DISCONTINUED | OUTPATIENT
Start: 2017-12-21 | End: 2017-12-21 | Stop reason: SDUPTHER

## 2017-12-21 RX ORDER — PROMETHAZINE HYDROCHLORIDE 25 MG/1
25 TABLET ORAL EVERY 6 HOURS PRN
Status: DISCONTINUED | OUTPATIENT
Start: 2017-12-21 | End: 2017-12-21 | Stop reason: SDUPTHER

## 2017-12-21 RX ORDER — LABETALOL HYDROCHLORIDE 5 MG/ML
5 INJECTION, SOLUTION INTRAVENOUS
Status: DISCONTINUED | OUTPATIENT
Start: 2017-12-21 | End: 2017-12-23 | Stop reason: HOSPADM

## 2017-12-21 RX ORDER — PROMETHAZINE HYDROCHLORIDE 25 MG/ML
12.5 INJECTION, SOLUTION INTRAMUSCULAR; INTRAVENOUS EVERY 6 HOURS PRN
Status: DISCONTINUED | OUTPATIENT
Start: 2017-12-21 | End: 2017-12-21 | Stop reason: SDUPTHER

## 2017-12-21 RX ORDER — ONDANSETRON 2 MG/ML
4 INJECTION INTRAMUSCULAR; INTRAVENOUS ONCE AS NEEDED
Status: DISCONTINUED | OUTPATIENT
Start: 2017-12-21 | End: 2017-12-23 | Stop reason: HOSPADM

## 2017-12-21 RX ORDER — CLINDAMYCIN PHOSPHATE 900 MG/50ML
900 INJECTION INTRAVENOUS ONCE
Status: COMPLETED | OUTPATIENT
Start: 2017-12-21 | End: 2017-12-21

## 2017-12-21 RX ORDER — OXYCODONE AND ACETAMINOPHEN 10; 325 MG/1; MG/1
1 TABLET ORAL EVERY 4 HOURS PRN
Status: DISCONTINUED | OUTPATIENT
Start: 2017-12-21 | End: 2017-12-23 | Stop reason: HOSPADM

## 2017-12-21 RX ORDER — FLUMAZENIL 0.1 MG/ML
0.2 INJECTION INTRAVENOUS AS NEEDED
Status: DISCONTINUED | OUTPATIENT
Start: 2017-12-21 | End: 2017-12-23 | Stop reason: HOSPADM

## 2017-12-21 RX ORDER — METHYLERGONOVINE MALEATE 0.2 MG/ML
200 INJECTION INTRAVENOUS ONCE AS NEEDED
Status: DISCONTINUED | OUTPATIENT
Start: 2017-12-21 | End: 2017-12-22 | Stop reason: HOSPADM

## 2017-12-21 RX ORDER — HYDROXYZINE HYDROCHLORIDE 25 MG/1
50 TABLET, FILM COATED ORAL EVERY 6 HOURS PRN
Status: DISCONTINUED | OUTPATIENT
Start: 2017-12-21 | End: 2017-12-23 | Stop reason: HOSPADM

## 2017-12-21 RX ORDER — LIDOCAINE HYDROCHLORIDE 20 MG/ML
INJECTION, SOLUTION INFILTRATION; PERINEURAL AS NEEDED
Status: DISCONTINUED | OUTPATIENT
Start: 2017-12-21 | End: 2017-12-21 | Stop reason: SURG

## 2017-12-21 RX ORDER — LANOLIN 100 %
OINTMENT (GRAM) TOPICAL
Status: DISCONTINUED | OUTPATIENT
Start: 2017-12-21 | End: 2017-12-23 | Stop reason: HOSPADM

## 2017-12-21 RX ORDER — ONDANSETRON 2 MG/ML
INJECTION INTRAMUSCULAR; INTRAVENOUS AS NEEDED
Status: DISCONTINUED | OUTPATIENT
Start: 2017-12-21 | End: 2017-12-21 | Stop reason: SURG

## 2017-12-21 RX ORDER — MISOPROSTOL 200 UG/1
800 TABLET ORAL AS NEEDED
Status: DISCONTINUED | OUTPATIENT
Start: 2017-12-21 | End: 2017-12-22 | Stop reason: HOSPADM

## 2017-12-21 RX ORDER — IBUPROFEN 600 MG/1
TABLET ORAL
Status: DISPENSED
Start: 2017-12-21 | End: 2017-12-22

## 2017-12-21 RX ORDER — ONDANSETRON 4 MG/1
4 TABLET, ORALLY DISINTEGRATING ORAL EVERY 6 HOURS PRN
Status: DISCONTINUED | OUTPATIENT
Start: 2017-12-21 | End: 2017-12-22 | Stop reason: HOSPADM

## 2017-12-21 RX ORDER — BISACODYL 10 MG
10 SUPPOSITORY, RECTAL RECTAL DAILY PRN
Status: DISCONTINUED | OUTPATIENT
Start: 2017-12-21 | End: 2017-12-23 | Stop reason: HOSPADM

## 2017-12-21 RX ORDER — ACETAMINOPHEN 650 MG/1
650 SUPPOSITORY RECTAL ONCE AS NEEDED
Status: DISCONTINUED | OUTPATIENT
Start: 2017-12-21 | End: 2017-12-23 | Stop reason: HOSPADM

## 2017-12-21 RX ORDER — CARBOPROST TROMETHAMINE 250 UG/ML
250 INJECTION, SOLUTION INTRAMUSCULAR AS NEEDED
Status: DISCONTINUED | OUTPATIENT
Start: 2017-12-21 | End: 2017-12-22 | Stop reason: HOSPADM

## 2017-12-21 RX ORDER — OXYTOCIN/RINGER'S LACTATE 20/1000 ML
1000 PLASTIC BAG, INJECTION (ML) INTRAVENOUS CONTINUOUS
Status: ACTIVE | OUTPATIENT
Start: 2017-12-21 | End: 2017-12-21

## 2017-12-21 RX ORDER — ACETAMINOPHEN 325 MG/1
650 TABLET ORAL ONCE AS NEEDED
Status: DISCONTINUED | OUTPATIENT
Start: 2017-12-21 | End: 2017-12-23 | Stop reason: HOSPADM

## 2017-12-21 RX ORDER — SUCCINYLCHOLINE CHLORIDE 20 MG/ML
INJECTION INTRAMUSCULAR; INTRAVENOUS AS NEEDED
Status: DISCONTINUED | OUTPATIENT
Start: 2017-12-21 | End: 2017-12-21 | Stop reason: SURG

## 2017-12-21 RX ORDER — PROPOFOL 10 MG/ML
VIAL (ML) INTRAVENOUS AS NEEDED
Status: DISCONTINUED | OUTPATIENT
Start: 2017-12-21 | End: 2017-12-21

## 2017-12-21 RX ORDER — OXYCODONE HYDROCHLORIDE AND ACETAMINOPHEN 5; 325 MG/1; MG/1
1 TABLET ORAL EVERY 4 HOURS PRN
Status: DISCONTINUED | OUTPATIENT
Start: 2017-12-21 | End: 2017-12-23 | Stop reason: HOSPADM

## 2017-12-21 RX ORDER — DOCUSATE SODIUM 100 MG/1
100 CAPSULE, LIQUID FILLED ORAL 2 TIMES DAILY PRN
Status: DISCONTINUED | OUTPATIENT
Start: 2017-12-21 | End: 2017-12-23 | Stop reason: HOSPADM

## 2017-12-21 RX ORDER — PROMETHAZINE HYDROCHLORIDE 12.5 MG/1
12.5 SUPPOSITORY RECTAL EVERY 6 HOURS PRN
Status: DISCONTINUED | OUTPATIENT
Start: 2017-12-21 | End: 2017-12-21 | Stop reason: SDUPTHER

## 2017-12-21 RX ORDER — OXYCODONE AND ACETAMINOPHEN 10; 325 MG/1; MG/1
1 TABLET ORAL EVERY 4 HOURS PRN
Status: DISCONTINUED | OUTPATIENT
Start: 2017-12-21 | End: 2017-12-22 | Stop reason: HOSPADM

## 2017-12-21 RX ORDER — MORPHINE SULFATE 1 MG/ML
INJECTION, SOLUTION EPIDURAL; INTRATHECAL; INTRAVENOUS AS NEEDED
Status: DISCONTINUED | OUTPATIENT
Start: 2017-12-21 | End: 2017-12-21 | Stop reason: SURG

## 2017-12-21 RX ORDER — ONDANSETRON 4 MG/1
4 TABLET, FILM COATED ORAL EVERY 8 HOURS PRN
Status: DISCONTINUED | OUTPATIENT
Start: 2017-12-21 | End: 2017-12-23 | Stop reason: HOSPADM

## 2017-12-21 RX ORDER — SODIUM CHLORIDE, SODIUM LACTATE, POTASSIUM CHLORIDE, CALCIUM CHLORIDE 600; 310; 30; 20 MG/100ML; MG/100ML; MG/100ML; MG/100ML
INJECTION, SOLUTION INTRAVENOUS CONTINUOUS PRN
Status: DISCONTINUED | OUTPATIENT
Start: 2017-12-21 | End: 2017-12-21 | Stop reason: SURG

## 2017-12-21 RX ORDER — ONDANSETRON 2 MG/ML
4 INJECTION INTRAMUSCULAR; INTRAVENOUS EVERY 6 HOURS PRN
Status: DISCONTINUED | OUTPATIENT
Start: 2017-12-21 | End: 2017-12-22 | Stop reason: HOSPADM

## 2017-12-21 RX ORDER — MISOPROSTOL 200 UG/1
800 TABLET ORAL ONCE
Status: DISCONTINUED | OUTPATIENT
Start: 2017-12-21 | End: 2017-12-23 | Stop reason: HOSPADM

## 2017-12-21 RX ADMIN — ONDANSETRON 4 MG: 2 INJECTION INTRAMUSCULAR; INTRAVENOUS at 02:20

## 2017-12-21 RX ADMIN — LIDOCAINE HYDROCHLORIDE 50 MG: 10 INJECTION, SOLUTION INFILTRATION; PERINEURAL at 01:54

## 2017-12-21 RX ADMIN — PRENATAL VIT W/ FE FUMARATE-FA TAB 27-0.8 MG 1 TABLET: 27-0.8 TAB at 09:04

## 2017-12-21 RX ADMIN — SODIUM CHLORIDE, POTASSIUM CHLORIDE, SODIUM LACTATE AND CALCIUM CHLORIDE: 600; 310; 30; 20 INJECTION, SOLUTION INTRAVENOUS at 01:33

## 2017-12-21 RX ADMIN — Medication: at 07:32

## 2017-12-21 RX ADMIN — GENTAMICIN SULFATE 80 MG: 40 INJECTION, SOLUTION INTRAMUSCULAR; INTRAVENOUS at 02:03

## 2017-12-21 RX ADMIN — LEVOTHYROXINE SODIUM 125 MCG: 125 TABLET ORAL at 07:31

## 2017-12-21 RX ADMIN — MAGNESIUM SULFATE HEPTAHYDRATE 2 G/HR: 500 INJECTION, SOLUTION INTRAMUSCULAR; INTRAVENOUS at 09:14

## 2017-12-21 RX ADMIN — SODIUM CHLORIDE, POTASSIUM CHLORIDE, SODIUM LACTATE AND CALCIUM CHLORIDE: 600; 310; 30; 20 INJECTION, SOLUTION INTRAVENOUS at 01:57

## 2017-12-21 RX ADMIN — IBUPROFEN 600 MG: 600 TABLET ORAL at 12:19

## 2017-12-21 RX ADMIN — Medication 4 MG: at 02:29

## 2017-12-21 RX ADMIN — FENTANYL CITRATE 100 MCG: 50 INJECTION, SOLUTION INTRAMUSCULAR; INTRAVENOUS at 02:06

## 2017-12-21 RX ADMIN — OXYTOCIN-SODIUM CHLORIDE 0.9% IV SOLN 30 UNIT/500ML 10 UNITS: 30-0.9/5 SOLUTION at 02:10

## 2017-12-21 RX ADMIN — IBUPROFEN 600 MG: 600 TABLET ORAL at 23:40

## 2017-12-21 RX ADMIN — LABETALOL HYDROCHLORIDE 200 MG: 200 TABLET, FILM COATED ORAL at 21:36

## 2017-12-21 RX ADMIN — Medication 20 UNITS: at 07:11

## 2017-12-21 RX ADMIN — SUCCINYLCHOLINE CHLORIDE 140 MG: 20 INJECTION, SOLUTION INTRAMUSCULAR; INTRAVENOUS at 01:54

## 2017-12-21 RX ADMIN — SODIUM CITRATE AND CITRIC ACID MONOHYDRATE 30 ML: 500; 334 SOLUTION ORAL at 01:27

## 2017-12-21 RX ADMIN — Medication 6 MG: at 02:40

## 2017-12-21 RX ADMIN — CLINDAMYCIN IN 5 PERCENT DEXTROSE 900 MG: 18 INJECTION, SOLUTION INTRAVENOUS at 02:03

## 2017-12-21 RX ADMIN — OXYTOCIN-SODIUM CHLORIDE 0.9% IV SOLN 30 UNIT/500ML 20 UNITS: 30-0.9/5 SOLUTION at 02:01

## 2017-12-21 RX ADMIN — MAGNESIUM SULFATE HEPTAHYDRATE 2 G/HR: 500 INJECTION, SOLUTION INTRAMUSCULAR; INTRAVENOUS at 21:36

## 2017-12-21 RX ADMIN — MISOPROSTOL 800 MCG: 200 TABLET ORAL at 02:42

## 2017-12-21 RX ADMIN — LABETALOL HYDROCHLORIDE 200 MG: 200 TABLET, FILM COATED ORAL at 09:04

## 2017-12-21 RX ADMIN — PROPOFOL 200 MG: 10 INJECTION, EMULSION INTRAVENOUS at 01:54

## 2017-12-21 RX ADMIN — OXYTOCIN 20 UNITS: 10 INJECTION INTRAVENOUS at 07:11

## 2017-12-21 RX ADMIN — LIDOCAINE HYDROCHLORIDE 18 ML: 20 INJECTION, SOLUTION INFILTRATION; PERINEURAL at 01:36

## 2017-12-21 NOTE — ADDENDUM NOTE
Addendum  created 12/21/17 1612 by Blanka Nassar, CRNA    Anesthesia Event edited, Anesthesia Intra LDAs edited, LDA properties accepted, Procedure Event Log accessed

## 2017-12-21 NOTE — ANESTHESIA POSTPROCEDURE EVALUATION
Patient: Jerrica Marquez    Procedure Summary     Date Anesthesia Start Anesthesia Stop Room / Location    12/20/17 1202 0247 (12/21/17)        Procedure Diagnosis Scheduled Providers Provider    LABOR ANALGESIA No diagnosis on file.  Jamal Guerra CRNA          Anesthesia Type: epidural  Last vitals  BP   178/74 (12/21/17 0253)   Temp   97.7 °F (36.5 °C) (12/21/17 0253)   Pulse   109 (12/21/17 0253)   Resp   18 (12/21/17 0253)     SpO2   94 % (12/21/17 0253)     Post Anesthesia Care and Evaluation    Patient location during evaluation: PACU  Patient participation: complete - patient participated  Level of consciousness: awake and awake and alert  Pain score: 2  Pain management: adequate  Airway patency: patent  Anesthetic complications: No anesthetic complications  PONV Status: none  Cardiovascular status: acceptable  Respiratory status: acceptable  Hydration status: acceptable  Post Neuraxial Block status: Motor and sensory function returned to baseline

## 2017-12-21 NOTE — PLAN OF CARE
Problem: Patient Care Overview (Adult)  Goal: Plan of Care Review  Outcome: Ongoing (interventions implemented as appropriate)   17 0612   Coping/Psychosocial Response Interventions   Plan Of Care Reviewed With patient   Patient Care Overview   Progress improving   Outcome Evaluation   Outcome Summary/Follow up Plan mag 2 g maintenance dose infusing, pain controlled with duramorph, SCDs on, rashid output adequate, LTV incision WDL, reflexes 2+, no clonus, visual disturbances or epigastric pain, bleeding small,      Goal: Adult Individualization and Mutuality  Outcome: Ongoing (interventions implemented as appropriate)    Goal: Discharge Needs Assessment  Outcome: Ongoing (interventions implemented as appropriate)      Problem: Anesthesia/Analgesia, Neuraxial (Obstetrics)  Goal: Signs and Symptoms of Listed Potential Problems Will be Absent or Manageable (Anesthesia/Analgesia, Neuraxial)  Outcome: Ongoing (interventions implemented as appropriate)      Problem: Fall Risk  (Adult,Obstetrics,Pediatric)  Goal: Identify Related Risk Factors and Signs and Symptoms  Outcome: Ongoing (interventions implemented as appropriate)    Goal: Absence of Maternal Fall  Outcome: Ongoing (interventions implemented as appropriate)    Goal: Absence of  Fall/Drop  Outcome: Ongoing (interventions implemented as appropriate)      Problem: Postpartum ( Delivery) (Adult)  Goal: Signs and Symptoms of Listed Potential Problems Will be Absent or Manageable (Postpartum)  Outcome: Ongoing (interventions implemented as appropriate)

## 2017-12-21 NOTE — ADDENDUM NOTE
Addendum  created 12/21/17 1615 by Blanka Nassar, CRNA    Anesthesia Intra LDAs edited, LDA properties accepted

## 2017-12-21 NOTE — ANESTHESIA PROCEDURE NOTES
Labor Epidural    Patient location during procedure: OB  Start Time: 12/20/2017 9:00 PM  Stop Time: 12/20/2017 9:14 PM  Preanesthetic Checklist  Completed: patient identified, site marked, surgical consent, pre-op evaluation, timeout performed, IV checked, risks and benefits discussed and monitors and equipment checked  Additional Notes  Old epidural not working epidural removed tip intact  Prep:  Pt Position:sitting  Sterile Tech:cap, gloves, gown, mask and sterile barrier  Epidural Block Procedure:  Approach:midline  Guidance:landmark technique  Location:L2-L3  Needle Type:Kathie  Paresthesia: none  Aspiration:negative  Test Dose:negative  Number of Attempts: 1  Post Assessment:  Dressing:occlusive dressing applied  Pt Tolerance:patient tolerated the procedure well with no apparent complications  Complications:no

## 2017-12-21 NOTE — PROGRESS NOTES
Rashid bulb still in placed, still to gravity.  Tug given, minimal give.      Patient states she feels just pressure but no contractions, but does notice the tightness.     Vitals:    17 1717 17 1730 17 1745 17 1759   BP: 155/77 162/81 164/87 170/91   BP Location:       Patient Position:       Pulse: 81 83 85 100   Resp:       Temp: 98.1 °F (36.7 °C)      TempSrc:       SpO2:       Weight:       Height:         General - sitting up in bed, AAOx3    SVE - rashid bulb in place  FHT - 135 baseline, +accels, no decels, moderate variability, category 1 strip  [place] - 4 ctx in 10 minutes    A/P: 26 yo  @ 37w1d admitted for IOL 2/2 PreE without SF.   1. PreE without SF - BPs have started to creep up, going to go ahead and given labetalol early now.  Discussed with patient the high possiblity of needing to start magnesium due to blood pressure.  Patient denies any symptoms.  Elevated in BPs might be due to discomfort, so will consult anesthesia for bolus.   2. IOL - pitocin at 16, FB in place  3. GBS positive  4. Epidural place    Continue to closely monitor.          This document has been electronically signed by Jayne Thibodeaux MD on 2017 6:15 PM

## 2017-12-21 NOTE — PROGRESS NOTES
Epidural replaced, finally comfortable.     Vitals:    12/20/17 2026 12/20/17 2031 12/20/17 2036 12/20/17 2053   BP: 141/72  157/78 135/64   BP Location:       Patient Position:       Pulse: 86 86 97 89   Resp:       Temp:       TempSrc:       SpO2:       Weight:       Height:           SROM'ed to clear fluid.  SVE 70/5-6/-3.      FHT category 1 strip.    [place] - 2-3 ctx in 10 minutes    Continue to titrate pitocin for adequate contraction pattern.  Continue Vanc for GBS positive.  Epidural in place.  FHT category 1 strip.  Continue magnesium.            This document has been electronically signed by Jayne Thibodeaux MD on December 20, 2017 9:31 PM

## 2017-12-21 NOTE — OP NOTE
SECTION DICTATION    Preoperative Diagnosis:   1. IUP 37w2d  2. Preeclampsia with severe features  3. Category 2 tracing  4. Arrest of dilation     Postoperative Diagnosis:   1. Same, delivered via PLTCS    Procedure: Primary Low Transverse  Section  Surgeon: Jayne Thibodeaux MD  Assist: Bindu Garzon  Anesthesia: General  EBL: 800 mL  Fluids: 500 mL    Findings: Live female infant in vertex presentation. Clear amniotic fluid. Weight of 7#12oz, Apgars 7/9. Normal uterus, tubes, ovaries bilaterally.    Indications: This is a 28 yo  @ 37w2d admitted for IOL secondary to preeclampsia with severe features.  Her induction started with cervidil, followed by pitocin, and rashid bulb.  Patient progress to 6cm, however, recurrent late decelerations were noted and were unresponsive to uterine resuscitative measures.  Given inability to dilate past 6 cm and continued category 2 tracing, the recommendation was made to proceed with  delivery.       A thorough consultation regarding the complications, risks, indications, benefits, failures, and alternatives were discussed with the patient including but not limited to bleeding, infection and injury to adjacent organs. The patient desired to proceed with the surgery. All questions and concerns were answered and addressed before proceeding to the operating room.    Description of Procedure:   After assuring informed consent, the patient was taken to the operating room with epidural in place.   The patient was placed in the dorsal, supine position with left lateral tilt. The abdomen was prepped and draped in sterile fashion and a Traxi device was used to lift the pannus. Time out was performed. Anesthesia was found to be inadequate, so the decision was made to proceed with under general anesthesia.     A Pfannenstiel skin incision was made with a scalpel and carried through to the level of the fascia. The fascial incision was extended bilaterally bluntly.  The rectus muscles were then dissected both bluntly superiorly from the fascia.    The rectus muscles were then  in the midline, and the peritoneum was then entered bluntly. The peritoneal incision was extended superiorly and inferiorly with good visualization of the bladder.    A bladder blade was then inserted, and with the bladder well out of the way of the anticipate hysterotomy, the lower uterine segment was incised in a transverse fashion with the scalpel and then extended bilaterally.    The bladder blade was removed.  There was difficultly in delivering the head.  The right rectus muscles were cut to provide more room, however, there was still a degree of difficulty in delivering.  Given continued difficulty the decision was made to use the Kiwi vacuum to aid in delivery.  After one pop-off and two applications, the infant's head was delivered. The nose and mouth were suctioned and after milking several times the cord was clamped and cut. The infant was handed off to the awaiting nursing staff. Cord segment and blood was collected.     The placenta was then removed manually. It was still delivered intact and a 3 vessel cord was identified. The uterus was exteriorized.  The uterus was cleared of all clots. The uterine incision was repaired in a single layer with 0-vicryl suture in a standard running locking fashion with excellent hemostasis achieved. A second layer of 0 vicryl was placed as an embricating layer.  There was some bleeding noted along the hysterotomy so using a 0 vicryl and figure of eight stitch, hemostasis was achieved.  The hysterotomy was inspected and a small hematoma along the right side of the hysterotomy was noted.  The hematoma was watched and noted to be stable and not expanding.  Normal Uterus and b/l tube and ovaries noted.  Pelvic gutters evacuated of all debris.  The uterus was returned to abdomen, and attention was turned back to the hysterotomy site, hemostatis remained  and hematoma remained stable.      The right rectus muscles were inspected and noted to be hemostatic, however, to unsure hemostasis Rufino was placed along the muscle.  Urine remained clear throughout the procedure.    The fascia was then re-approximated with 0 Vicryl in a running fashion. The subcutaneous tissue were irrigated and rendered hemostatic with Bovie cautery.  The space was then closed with running sutures of 2.0 plain. The skin was closed with 3-0 monocryl and a sterile dressing was applied.     The patient tolerated the procedure well. Needle, lap, and sponge counts were correct times two. Gentamycin and Clindamycin were given prior to the procedure, and a sterile dressing was placed over the incision.    Complications: none  Specimens: cord blood, cord gases, and placenta          This document has been electronically signed by Jayne Thibodeaux MD on December 21, 2017 2:42 AM

## 2017-12-21 NOTE — ADDENDUM NOTE
Addendum  created 12/21/17 1735 by Redd Finley MD    Anesthesia Intra LDAs edited, LDA properties accepted

## 2017-12-21 NOTE — PROGRESS NOTES
August bulb out.     SVE 70/4-5/-3, not engaged unable to AROM.     Will sit patient in high Michelle's and will reexamine in 1 hour or earlier if indicated.           This document has been electronically signed by Jayne Thibodeaux MD on December 20, 2017 6:45 PM

## 2017-12-21 NOTE — PROGRESS NOTES
POSTPARTUM PROGRESS NOTE  NAME: Jerrica Marquez  : 1990  MRN: [id number]      LOS: 2 days     Chief Complaint: No complaints at this time.     Subjective:     Interval History:  Pain well controlled with medications, (+) Flatus, (-) BM, lochia minimal, (+) voiding, (+) ambulation. Tolerating PO fluids well.    Objective:     Vital Signs  Temp:  [97.6 °F (36.4 °C)-98.7 °F (37.1 °C)] 98.5 °F (36.9 °C)  Heart Rate:  [] 100  Resp:  [18] 18  BP: (101-211)/() 101/59    Physical Exam  GEN: A&O x3, NAD  CVS: RRR, S1/S2, no m/g/r  LUNGS: CTAB, no wheezes, no rhonchi  ABD: Soft, tender  Fundus: firm below umbilicus, Dressing C/D/I.  EXT: Mild edema, no calf tenderness bilaterally.    Medication Review    Current Facility-Administered Medications:   •  acetaminophen (TYLENOL) tablet 650 mg, 650 mg, Oral, Once PRN **OR** acetaminophen (TYLENOL) suppository 650 mg, 650 mg, Rectal, Once PRN, Jamal Guerra CRNA  •  bisacodyl (DULCOLAX) suppository 10 mg, 10 mg, Rectal, Daily PRN, Jayne Thibodeaux MD  •  carboprost (HEMABATE) injection 250 mcg, 250 mcg, Intramuscular, PRN, Jayne Thibodeaux MD  •  diphenhydrAMINE (BENADRYL) capsule 25 mg, 25 mg, Oral, Nightly PRN **OR** diphenhydrAMINE (BENADRYL) injection 25 mg, 25 mg, Intravenous, Nightly PRN, Jayne Thibodeaux MD  •  docusate sodium (COLACE) capsule 100 mg, 100 mg, Oral, BID PRN, Jayne Thibodeaux MD  •  ePHEDrine injection 5 mg, 5 mg, Intravenous, Once PRN, Jamal Guerra CRNA  •  flumazenil (ROMAZICON) injection 0.2 mg, 0.2 mg, Intravenous, PRN, Jamal Guerra CRNA  •  HYDROmorphone (DILAUDID) injection 0.5 mg, 0.5 mg, Intravenous, Q5 Min PRN, Jamal Guerra CRNA  •  hydrOXYzine (ATARAX) tablet 50 mg, 50 mg, Oral, Q6H PRN, Jayne Thibodeaux MD  •  ibuprofen (ADVIL,MOTRIN) tablet 600 mg, 600 mg, Oral, Q6H PRN, Jayne Thibodeaux MD, 600 mg at 17 1219  •  labetalol (NORMODYNE) tablet 200 mg, 200 mg, Oral, Q12H, Rodolfo Ingram MD, 200 mg at  12/21/17 0904  •  labetalol (NORMODYNE,TRANDATE) injection 5 mg, 5 mg, Intravenous, Q5 Min PRN, Jamal Guerra CRNA  •  lanolin ointment, , Topical, Q1H PRN, Jayne Thibodeaux MD  •  levothyroxine (SYNTHROID, LEVOTHROID) tablet 125 mcg, 125 mcg, Oral, Q AM, Jayne Thibodeaux MD, 125 mcg at 12/21/17 0731  •  magnesium hydroxide (MILK OF MAGNESIA) suspension 2400 mg/10mL 10 mL, 10 mL, Oral, Daily PRN, Jayne Thibodeaux MD  •  magnesium sulfate 20 g in lactated ringers 500 mL (0.04 g/mL) IVPB, 2 g/hr, Intravenous, Continuous, Jayne Thibodeaux MD, Last Rate: 50 mL/hr at 12/21/17 0914, 2 g/hr at 12/21/17 0914  •  meperidine (DEMEROL) injection 12.5 mg, 12.5 mg, Intravenous, Q5 Min PRN, Jamal Guerra CRNA  •  methylergonovine (METHERGINE) injection 200 mcg, 200 mcg, Intramuscular, Once PRN, Jayne Thibodeaux MD  •  misoprostol (CYTOTEC) tablet 800 mcg, 800 mcg, Oral, Once, Jayne Thibodeaux MD  •  misoprostol (CYTOTEC) tablet 800 mcg, 800 mcg, Rectal, PRN, Jayne Thibodeaux MD  •  naloxone (NARCAN) injection 0.2 mg, 0.2 mg, Intravenous, PRN, Jamal Guerra CRNA  •  ondansetron (ZOFRAN) tablet 4 mg, 4 mg, Oral, Q6H PRN **OR** ondansetron ODT (ZOFRAN-ODT) disintegrating tablet 4 mg, 4 mg, Oral, Q6H PRN **OR** ondansetron (ZOFRAN) injection 4 mg, 4 mg, Intravenous, Q6H PRN, Jayne Thibodeaux MD  •  ondansetron (ZOFRAN) injection 4 mg, 4 mg, Intravenous, Once PRN, Jamal Guerra CRNA  •  ondansetron (ZOFRAN) tablet 4 mg, 4 mg, Oral, Q8H PRN, Jayne Thibodeaux MD  •  oxyCODONE-acetaminophen (PERCOCET)  MG per tablet 1 tablet, 1 tablet, Oral, Q4H PRN, Jayne Thibodeaux MD  •  oxyCODONE-acetaminophen (PERCOCET)  MG per tablet 1 tablet, 1 tablet, Oral, Q4H PRN, Jayne Thibodeaux MD  •  oxyCODONE-acetaminophen (PERCOCET) 5-325 MG per tablet 1 tablet, 1 tablet, Oral, Q4H PRN, Jayne Thibodeaux MD  •  prenatal vitamin 27-0.8 tablet 1 tablet, 1 tablet, Oral, Daily, Jayne Thibodeaux MD, 1 tablet at 12/21/17 0904  •  promethazine (PHENERGAN)  injection 12.5 mg, 12.5 mg, Intravenous, Q6H PRN **OR** promethazine (PHENERGAN) injection 12.5 mg, 12.5 mg, Intramuscular, Q6H PRN **OR** promethazine (PHENERGAN) suppository 12.5 mg, 12.5 mg, Rectal, Q6H PRN **OR** promethazine (PHENERGAN) tablet 12.5 mg, 12.5 mg, Oral, Q6H PRN, Jayne Thibodeaux MD  •  sodium chloride 0.9 % flush  - ADS Override Pull, , , ,   •  sodium chloride 0.9 % flush  - ADS Override Pull, , , ,   •  sodium chloride 0.9 % flush 1-10 mL, 1-10 mL, Intravenous, PRN, Jayne Thibodeaux MD     Diagnostic Data    Lab Results (last 24 hours)     Procedure Component Value Units Date/Time    Tissue Pathology Exam - Tissue, Placenta [[id number]] Collected:  17    Specimen:  Tissue from Placenta Updated:  17          I reviewed the patient's new clinical results.    Assessment/Plan:     Jerrica Marquez 27 y.o. POD #0 s/p Primary Low Transverse  Section secondary to preeclampsia with severe features.  1. Encourage ambulation  2. Continue routine postpartum care.  3. /59 on Labetalol 200mg BID. Continue Mg infusion.          This document has been electronically signed by Ramon Montanez MD on 2017 4:04 PM

## 2017-12-21 NOTE — PROGRESS NOTES
Patient uncomfortable.  Will consult anesthesia for pain control.  Will reexamine once comfortable, will AROM if possible.     Mag to be started for PreE with SF based off of BPs.           This document has been electronically signed by Jayne Thibodeaux MD on December 20, 2017 7:41 PM

## 2017-12-21 NOTE — PROGRESS NOTES
Category 2 tracing, unimproved with position changes.      Minimal change in SVE with 80/6/-3, small amount of caput noted.     Discussed with patient that given continue category 2 tracing and remote from delivery would recommend proceed with  for delivery, patient in agreement.     Discussed the benefits and risk. Discussed the risk of infection, pain, scarring, and potential damage to surrounding organs including but not limited to bladder, bowel, ureters, nerves, arteries, and veins. Discussed the risk of bleeding and potentially need for additional medications to stop/control bleeding. Discussed that if bleeding was unable to be controlled by medications or additional procedures, there is a possibility that a hysterectomy would need to be preformed.      Discussed with the patient the risk to any surgery in general, including but not limited to death, risk of anesthesia, for which patient will discuss more with anesthesia, the potential development of a blood clot, and the potential development of a urinary tract infection or need for prolonged use of a indwelling catheter.  Discussed recovery times and what to expect following surgery.      All questions and concerns were addressed. Consents to be signed.  Charge nurse, NICU, and anesthesia notified.           This document has been electronically signed by Jayne Thibodeaux MD on 2017 1:06 AM

## 2017-12-22 LAB
BASOPHILS # BLD AUTO: 0.01 10*3/MM3 (ref 0–0.2)
BASOPHILS NFR BLD AUTO: 0.1 % (ref 0–2)
DEPRECATED RDW RBC AUTO: 47.4 FL (ref 36.4–46.3)
EOSINOPHIL # BLD AUTO: 0.01 10*3/MM3 (ref 0–0.7)
EOSINOPHIL NFR BLD AUTO: 0.1 % (ref 0–7)
ERYTHROCYTE [DISTWIDTH] IN BLOOD BY AUTOMATED COUNT: 16.2 % (ref 11.5–14.5)
HCT VFR BLD AUTO: 28 % (ref 35–45)
HGB BLD-MCNC: 9 G/DL (ref 12–15.5)
IMM GRANULOCYTES # BLD: 0.05 10*3/MM3 (ref 0–0.02)
IMM GRANULOCYTES NFR BLD: 0.4 % (ref 0–0.5)
LAB AP CASE REPORT: NORMAL
LYMPHOCYTES # BLD AUTO: 1.21 10*3/MM3 (ref 0.6–4.2)
LYMPHOCYTES NFR BLD AUTO: 9.7 % (ref 10–50)
Lab: NORMAL
MCH RBC QN AUTO: 25.8 PG (ref 26.5–34)
MCHC RBC AUTO-ENTMCNC: 32.1 G/DL (ref 31.4–36)
MCV RBC AUTO: 80.2 FL (ref 80–98)
MONOCYTES # BLD AUTO: 1.28 10*3/MM3 (ref 0–0.9)
MONOCYTES NFR BLD AUTO: 10.3 % (ref 0–12)
NEUTROPHILS # BLD AUTO: 9.86 10*3/MM3 (ref 2–8.6)
NEUTROPHILS NFR BLD AUTO: 79.4 % (ref 37–80)
NRBC BLD MANUAL-RTO: 0 /100 WBC (ref 0–0)
PATH REPORT.FINAL DX SPEC: NORMAL
PATH REPORT.GROSS SPEC: NORMAL
PLATELET # BLD AUTO: 204 10*3/MM3 (ref 150–450)
PMV BLD AUTO: 9.6 FL (ref 8–12)
RBC # BLD AUTO: 3.49 10*6/MM3 (ref 3.77–5.16)
WBC NRBC COR # BLD: 12.42 10*3/MM3 (ref 3.2–9.8)

## 2017-12-22 PROCEDURE — 85025 COMPLETE CBC W/AUTO DIFF WBC: CPT | Performed by: OBSTETRICS & GYNECOLOGY

## 2017-12-22 PROCEDURE — 99232 SBSQ HOSP IP/OBS MODERATE 35: CPT | Performed by: OBSTETRICS & GYNECOLOGY

## 2017-12-22 RX ORDER — OXYCODONE AND ACETAMINOPHEN 10; 325 MG/1; MG/1
TABLET ORAL
Status: COMPLETED
Start: 2017-12-22 | End: 2017-12-22

## 2017-12-22 RX ADMIN — OXYCODONE HYDROCHLORIDE AND ACETAMINOPHEN 1 TABLET: 10; 325 TABLET ORAL at 01:50

## 2017-12-22 RX ADMIN — OXYCODONE HYDROCHLORIDE AND ACETAMINOPHEN 1 TABLET: 10; 325 TABLET ORAL at 09:55

## 2017-12-22 RX ADMIN — PRENATAL VIT W/ FE FUMARATE-FA TAB 27-0.8 MG 1 TABLET: 27-0.8 TAB at 09:55

## 2017-12-22 RX ADMIN — LABETALOL HYDROCHLORIDE 200 MG: 200 TABLET, FILM COATED ORAL at 09:55

## 2017-12-22 RX ADMIN — OXYCODONE HYDROCHLORIDE AND ACETAMINOPHEN 1 TABLET: 10; 325 TABLET ORAL at 22:46

## 2017-12-22 RX ADMIN — OXYCODONE HYDROCHLORIDE AND ACETAMINOPHEN 1 TABLET: 10; 325 TABLET ORAL at 17:58

## 2017-12-22 RX ADMIN — LABETALOL HYDROCHLORIDE 200 MG: 200 TABLET, FILM COATED ORAL at 20:52

## 2017-12-22 RX ADMIN — OXYCODONE HYDROCHLORIDE AND ACETAMINOPHEN 1 TABLET: 10; 325 TABLET ORAL at 13:58

## 2017-12-22 RX ADMIN — OXYCODONE HYDROCHLORIDE AND ACETAMINOPHEN 1 TABLET: 10; 325 TABLET ORAL at 05:52

## 2017-12-22 RX ADMIN — LEVOTHYROXINE SODIUM 125 MCG: 125 TABLET ORAL at 05:48

## 2017-12-22 NOTE — PROGRESS NOTES
POSTPARTUM PROGRESS NOTE  NAME: Jerrica Marquez  : 1990  MRN: [id number]      LOS: 3 days     Chief Complaint: No complaints at this time.     Subjective:     Interval History:  Pain well controlled with medications, (+) Flatus, (-) BM, lochia minimal, (+) voiding, (+) ambulation. Tolerating PO fluids well.    Objective:     Vital Signs  Temp:  [98.3 °F (36.8 °C)-98.5 °F (36.9 °C)] 98.5 °F (36.9 °C)  Heart Rate:  [] 93  Resp:  [16-20] 18  BP: (101-142)/(53-76) 119/57    Physical Exam  GEN: A&O x3, NAD  CVS: RRR, S1/S2, no m/g/r  LUNGS: CTAB, no wheezes, no rhonchi  ABD: Soft, tender  Fundus: firm below umbilicus, Dressing C/D/I.  EXT: Mild edema, no calf tenderness bilaterally.    Medication Review    Current Facility-Administered Medications:   •  acetaminophen (TYLENOL) tablet 650 mg, 650 mg, Oral, Once PRN **OR** acetaminophen (TYLENOL) suppository 650 mg, 650 mg, Rectal, Once PRN, Jamal Guerra CRNA  •  bisacodyl (DULCOLAX) suppository 10 mg, 10 mg, Rectal, Daily PRN, Jayne Thibodeaux MD  •  docusate sodium (COLACE) capsule 100 mg, 100 mg, Oral, BID PRN, Jayne Thibodeaux MD  •  ePHEDrine injection 5 mg, 5 mg, Intravenous, Once PRN, Jamal Guerra CRNA  •  flumazenil (ROMAZICON) injection 0.2 mg, 0.2 mg, Intravenous, PRN, Jamal Guerra CRNA  •  HYDROmorphone (DILAUDID) injection 0.5 mg, 0.5 mg, Intravenous, Q5 Min PRN, Jamal Guerra CRNA  •  hydrOXYzine (ATARAX) tablet 50 mg, 50 mg, Oral, Q6H PRN, Jayne Thibodeaux MD  •  labetalol (NORMODYNE) tablet 200 mg, 200 mg, Oral, Q12H, Rodolfo Ingram MD, 200 mg at 176  •  labetalol (NORMODYNE,TRANDATE) injection 5 mg, 5 mg, Intravenous, Q5 Min PRN, Jamal Guerra CRNA  •  lanolin ointment, , Topical, Q1H PRN, Jayne Thibodeaux MD  •  levothyroxine (SYNTHROID, LEVOTHROID) tablet 125 mcg, 125 mcg, Oral, Q AM, Jayne Thibodeaux MD, 125 mcg at 17 8755  •  magnesium hydroxide (MILK OF MAGNESIA) suspension 2400 mg/10mL 10 mL, 10 mL,  Oral, Daily PRN, Jayne Thibodeaux MD  •  magnesium sulfate 20 g in lactated ringers 500 mL (0.04 g/mL) IVPB, 2 g/hr, Intravenous, Continuous, Jayne Thibodeaux MD, Last Rate: 50 mL/hr at 12/22/17 0145, 2 g at 12/22/17 0145  •  meperidine (DEMEROL) injection 12.5 mg, 12.5 mg, Intravenous, Q5 Min PRN, Jamal Guerra CRNA  •  misoprostol (CYTOTEC) tablet 800 mcg, 800 mcg, Oral, Once, Jayne Thibodeaux MD  •  naloxone (NARCAN) injection 0.2 mg, 0.2 mg, Intravenous, PRN, Jamal Guerra CRNA  •  ondansetron (ZOFRAN) injection 4 mg, 4 mg, Intravenous, Once PRN, Jamal Guerra CRNA  •  ondansetron (ZOFRAN) tablet 4 mg, 4 mg, Oral, Q8H PRN, Jayne Thibodeaux MD  •  oxyCODONE-acetaminophen (PERCOCET)  MG per tablet 1 tablet, 1 tablet, Oral, Q4H PRN, Jayne Thibodeaux MD, 1 tablet at 12/22/17 0552  •  oxyCODONE-acetaminophen (PERCOCET) 5-325 MG per tablet 1 tablet, 1 tablet, Oral, Q4H PRN, Jayne Thibodeaux MD  •  prenatal vitamin 27-0.8 tablet 1 tablet, 1 tablet, Oral, Daily, Jayne Thibodeaux MD, 1 tablet at 12/21/17 0904  •  sodium chloride 0.9 % flush 1-10 mL, 1-10 mL, Intravenous, PRN, Jayne Thibodeaux MD     Diagnostic Data    Lab Results (last 24 hours)     Procedure Component Value Units Date/Time    Tissue Pathology Exam - Tissue, Placenta [[id number]] Collected:  12/21/17 0748    Specimen:  Tissue from Placenta Updated:  12/21/17 0810    CBC & Differential [[id number]] Collected:  12/22/17 0307    Specimen:  Blood Updated:  12/22/17 0328    Narrative:       The following orders were created for panel order CBC & Differential.  Procedure                               Abnormality         Status                     ---------                               -----------         ------                     CBC Auto Differential[[id number]]        Abnormal            Final result                 Please view results for these tests on the individual orders.    CBC Auto Differential [[id number]]  (Abnormal) Collected:  12/22/17 0301     Specimen:  Blood Updated:  17 0328     WBC 12.42 (H) 10*3/mm3      RBC 3.49 (L) 10*6/mm3      Hemoglobin 9.0 (L) g/dL      Hematocrit 28.0 (L) %      MCV 80.2 fL      MCH 25.8 (L) pg      MCHC 32.1 g/dL      RDW 16.2 (H) %      RDW-SD 47.4 (H) fl      MPV 9.6 fL      Platelets 204 10*3/mm3      Neutrophil % 79.4 %      Lymphocyte % 9.7 (L) %      Monocyte % 10.3 %      Eosinophil % 0.1 %      Basophil % 0.1 %      Immature Grans % 0.4 %      Neutrophils, Absolute 9.86 (H) 10*3/mm3      Lymphocytes, Absolute 1.21 10*3/mm3      Monocytes, Absolute 1.28 (H) 10*3/mm3      Eosinophils, Absolute 0.01 10*3/mm3      Basophils, Absolute 0.01 10*3/mm3      Immature Grans, Absolute 0.05 (H) 10*3/mm3      nRBC 0.0 /100 WBC           I reviewed the patient's new clinical results.    Assessment/Plan:     Jerrica Berryon 27 y.o. POD #1 s/p Primary Low Transverse  Section secondary to preeclampsia with severe features.  1. Encourage ambulation  2. Continue routine postpartum care.  3. /53 on Labetalol 200mg BID.          This document has been electronically signed by Ramon Montanez MD on 2017 6:22 AM

## 2017-12-22 NOTE — PLAN OF CARE
Problem: Patient Care Overview (Adult)  Goal: Plan of Care Review  Outcome: Ongoing (interventions implemented as appropriate)   12/22/17 0511   Coping/Psychosocial Response Interventions   Plan Of Care Reviewed With patient   Patient Care Overview   Progress improving   Outcome Evaluation   Outcome Summary/Follow up Plan vss stable, passing flatus, mag d/c,d at 0200, syncope episode at 0200 cbc collected hemoglobin 9 from 11.9 hemacrit 28 from 35.5 needs to void     Goal: Discharge Needs Assessment  Outcome: Ongoing (interventions implemented as appropriate)

## 2017-12-23 VITALS
DIASTOLIC BLOOD PRESSURE: 64 MMHG | RESPIRATION RATE: 20 BRPM | SYSTOLIC BLOOD PRESSURE: 125 MMHG | BODY MASS INDEX: 45.48 KG/M2 | OXYGEN SATURATION: 99 % | TEMPERATURE: 100 F | HEART RATE: 95 BPM | WEIGHT: 273 LBS | HEIGHT: 65 IN

## 2017-12-23 PROBLEM — O99.213 OBESITY AFFECTING PREGNANCY IN THIRD TRIMESTER: Status: RESOLVED | Noted: 2017-12-13 | Resolved: 2017-12-23

## 2017-12-23 PROBLEM — E03.9 HYPOTHYROIDISM AFFECTING PREGNANCY IN THIRD TRIMESTER: Status: RESOLVED | Noted: 2017-06-23 | Resolved: 2017-12-23

## 2017-12-23 PROBLEM — O36.63X1: Status: RESOLVED | Noted: 2017-10-04 | Resolved: 2017-12-23

## 2017-12-23 PROBLEM — O35.BXX1 ECHOGENIC FOCUS OF HEART OF FETUS AFFECTING ANTEPARTUM CARE OF MOTHER, FETUS 1: Status: RESOLVED | Noted: 2017-08-09 | Resolved: 2017-12-23

## 2017-12-23 PROBLEM — O14.03 MILD PREECLAMPSIA, THIRD TRIMESTER: Status: RESOLVED | Noted: 2017-12-04 | Resolved: 2017-12-23

## 2017-12-23 PROBLEM — O99.283 HYPOTHYROIDISM AFFECTING PREGNANCY IN THIRD TRIMESTER: Status: RESOLVED | Noted: 2017-06-23 | Resolved: 2017-12-23

## 2017-12-23 PROCEDURE — 99238 HOSP IP/OBS DSCHRG MGMT 30/<: CPT | Performed by: OBSTETRICS & GYNECOLOGY

## 2017-12-23 PROCEDURE — 25010000002 TDAP 5-2.5-18.5 LF-MCG/0.5 SUSPENSION: Performed by: OBSTETRICS & GYNECOLOGY

## 2017-12-23 PROCEDURE — 90471 IMMUNIZATION ADMIN: CPT | Performed by: OBSTETRICS & GYNECOLOGY

## 2017-12-23 PROCEDURE — 90715 TDAP VACCINE 7 YRS/> IM: CPT | Performed by: OBSTETRICS & GYNECOLOGY

## 2017-12-23 RX ORDER — OXYCODONE HYDROCHLORIDE AND ACETAMINOPHEN 5; 325 MG/1; MG/1
1 TABLET ORAL EVERY 4 HOURS PRN
Qty: 30 TABLET | Refills: 0 | Status: SHIPPED | OUTPATIENT
Start: 2017-12-23 | End: 2017-12-31

## 2017-12-23 RX ADMIN — OXYCODONE HYDROCHLORIDE AND ACETAMINOPHEN 1 TABLET: 10; 325 TABLET ORAL at 04:18

## 2017-12-23 RX ADMIN — TETANUS TOXOID, REDUCED DIPHTHERIA TOXOID AND ACELLULAR PERTUSSIS VACCINE, ADSORBED 0.5 ML: 5; 2.5; 8; 8; 2.5 SUSPENSION INTRAMUSCULAR at 08:30

## 2017-12-23 RX ADMIN — LEVOTHYROXINE SODIUM 125 MCG: 125 TABLET ORAL at 05:47

## 2017-12-23 NOTE — PROGRESS NOTES
POSTPARTUM PROGRESS NOTE  NAME: Jerrica Marquez  : 1990  MRN: [id number]      LOS: 4 days     Chief Complaint: No complaints at this time.     Subjective:     Interval History:  Pain well controlled with medications, (+) Flatus, (-) BM, lochia minimal, (+) voiding, (+) ambulation. Tolerating PO fluids well.    Objective:     Vital Signs  Temp:  [98.5 °F (36.9 °C)-98.8 °F (37.1 °C)] 98.8 °F (37.1 °C)  Heart Rate:  [] 88  Resp:  [16-20] 20  BP: (121-135)/(57-74) 124/70    Physical Exam  GEN: A&O x3, NAD  CVS: RRR, S1/S2, no m/g/r  LUNGS: CTAB, no wheezes, no rhonchi  ABD: Soft, tender  Fundus: firm below umbilicus, Dressing C/D/I.  EXT: Mild edema, no calf tenderness bilaterally.    Medication Review    Current Facility-Administered Medications:   •  acetaminophen (TYLENOL) tablet 650 mg, 650 mg, Oral, Once PRN **OR** acetaminophen (TYLENOL) suppository 650 mg, 650 mg, Rectal, Once PRN, Jamal Guerra CRNA  •  bisacodyl (DULCOLAX) suppository 10 mg, 10 mg, Rectal, Daily PRN, Jayne Thibodeaux MD  •  docusate sodium (COLACE) capsule 100 mg, 100 mg, Oral, BID PRN, Jayne Thibodeaux MD  •  ePHEDrine injection 5 mg, 5 mg, Intravenous, Once PRN, Jamal Guerra CRNA  •  flumazenil (ROMAZICON) injection 0.2 mg, 0.2 mg, Intravenous, PRN, Jamal Guerra CRNA  •  HYDROmorphone (DILAUDID) injection 0.5 mg, 0.5 mg, Intravenous, Q5 Min PRN, Jamal Guerra CRNA  •  hydrOXYzine (ATARAX) tablet 50 mg, 50 mg, Oral, Q6H PRN, Jayne Thibodeaux MD  •  labetalol (NORMODYNE) tablet 200 mg, 200 mg, Oral, Q12H, Rodolfo Ingram MD, 200 mg at 17  •  labetalol (NORMODYNE,TRANDATE) injection 5 mg, 5 mg, Intravenous, Q5 Min PRN, Jamal Guerra CRNA  •  lanolin ointment, , Topical, Q1H PRN, Jayne Thibodeaux MD  •  levothyroxine (SYNTHROID, LEVOTHROID) tablet 125 mcg, 125 mcg, Oral, Q AM, Jayne Thibodeaux MD, 125 mcg at 17 0595  •  magnesium hydroxide (MILK OF MAGNESIA) suspension 2400 mg/10mL 10 mL, 10 mL,  Oral, Daily PRN, Jayne Thibodeaux MD  •  magnesium sulfate 20 g in lactated ringers 500 mL (0.04 g/mL) IVPB, 2 g/hr, Intravenous, Continuous, Jayne Thibodeaux MD, Last Rate: 50 mL/hr at 12/22/17 0145, 2 g at 12/22/17 0145  •  misoprostol (CYTOTEC) tablet 800 mcg, 800 mcg, Oral, Once, Jayne Thibodeaux MD  •  naloxone (NARCAN) injection 0.2 mg, 0.2 mg, Intravenous, PRN, Jamal Guerra CRNA  •  ondansetron (ZOFRAN) injection 4 mg, 4 mg, Intravenous, Once PRN, Jamal Guerar CRNA  •  ondansetron (ZOFRAN) tablet 4 mg, 4 mg, Oral, Q8H PRN, Jayne Thibodeaux MD  •  oxyCODONE-acetaminophen (PERCOCET)  MG per tablet 1 tablet, 1 tablet, Oral, Q4H PRN, Jayne Thibodeaux MD, 1 tablet at 12/23/17 0418  •  oxyCODONE-acetaminophen (PERCOCET) 5-325 MG per tablet 1 tablet, 1 tablet, Oral, Q4H PRN, Jayne Thibodeaux MD  •  prenatal vitamin 27-0.8 tablet 1 tablet, 1 tablet, Oral, Daily, Jayne Thibodeaux MD, 1 tablet at 12/22/17 0955  •  sodium chloride 0.9 % flush 1-10 mL, 1-10 mL, Intravenous, PRN, Jayne Thibodeaux MD  •  Tdap (BOOSTRIX) injection 0.5 mL, 0.5 mL, Intramuscular, During Hospitalization, Jayne Thibodeaux MD     Diagnostic Data    Lab Results (last 24 hours)     Procedure Component Value Units Date/Time    Tissue Pathology Exam - Tissue, Placenta [[id number]] Collected:  12/21/17 0748    Specimen:  Tissue from Placenta Updated:  12/22/17 1233     Case Report --     Surgical Pathology Report                         Case: [id number]                                  Authorizing Provider:  Jayne Thibodeaux MD         Collected:           12/21/2017 07:48 AM          Ordering Location:     [facility]             Received:            12/21/2017 08:10 AM                                 [facility]                                                                                  DELIVERY                                                                     Pathologist:           Luis Webb MD                               "                            Specimen:    Placenta                                                                                    Final Diagnosis --     MONOAMNIONIC MONOCHORIONIC PLACENTA:   CHORANGIOSIS.   PERIVILLOUS FIBRIN DEPOSITION.     UMBILICAL CORD:   2 ARTERIES AND 1 VEIN.        Gross Description --     The container is labeled \"placenta\" and has a monoamnionic monochorionic placenta with 1 umbilical cord,  The placental disc measures 19.0 x 16.0 x 2.0 cm.  The amnion is smooth, glistening and bluish gray.  An umbilical cord measuring 18.0 x 1.2 x 1.2 cm inserts in a central location.  Two separate segments of umbilical cord measure 9.0 x 2.5 x 1.5 cm and 29.0 x 1.5 x 1.0 cm.  The cut surfaces of the umbilical cord have 2 arteries and 1 vein.  The placenta weighs 554 grams, after membranes and umbilical cord are removed.  The cotyledons are spongy and robust without cysts, infarcts or tumor.  Representative sections are embedded as 1A through 1G.        Embedded Images --          I reviewed the patient's new clinical results.    Assessment/Plan:     Jerrica Marquez 27 y.o. POD #2 s/p Primary Low Transverse  Section secondary to preeclampsia with severe features.  1. Encourage ambulation  2. Continue routine postpartum care.  3. /70 on Labetalol 200mg BID.          This document has been electronically signed by Ramon Montanez MD on 2017 7:21 AM      "

## 2017-12-23 NOTE — DISCHARGE INSTRUCTIONS
"...Notify Dr of... heavy bleeding, passing clots, foul odor to your discharge, temperature above 100.4, burning when urinating, gapping or drainage from incision or episiotomy, or for pain not relieved by taking pain medication, redness or streaking in breasts, pain or redness in legs.    Take all medications as prescribed.  Continue taking iron or prenatal vitamin while breastfeeding or until you run out.  Take rest periods several times during the day.  \"Baby Blues\" are normal and may be present around the 3rd-4th day after delivery. If they last longer than 2-3 days, please let your Dr know.  Pelvic rest for 6 weeks. No douching, tampons, or intercourse  No driving for 2 weeks  No lifting anything heavier than the baby  Wear a good supportive bra 24 hours/day to prevent engorgement  "

## 2017-12-23 NOTE — DISCHARGE SUMMARY
OBSTETRICS DISCHARGE SUMMARY    NAME: Jerrica Marquez     ADMITTED: 2017  : 1990     DISCHARGED: 17  MRN: [id number]    ADMISSION DIAGNOSES:  1. Intrauterine pregnancy at 37w2d GA  2.  IOL 2/2 PreE without SF DISCHARGE DIAGNOSES:  1. S/p Primary Low Transverse  Section     PROCEDURES: , Low Transverse   DELIVERING PHYSICIAN: Jayne Thibodeaux MD    HISTORY AND HOSPITAL COURSE:    Patient is a 28 yo  @ 37w2d admitted for IOL secondary to preeclampsia with severe features.  Her induction started with cervidil, followed by pitocin, and rashid bulb.  Patient progressed to 6cm, however, recurrent late decelerations were noted and were unresponsive to uterine resuscitative measures.  Given inability to dilate past 6 cm and continued category 2 tracing, the recommendation was made to proceed with  delivery. Estimated Date of Delivery: 18. Her pregnancy was complicated by PreE without SF and hypothyroidism. Please see H&P for full details.     She had a  Caesarian delivery of a viable female infant, 7lbs 12oz, Apgars 7/9. No immediate complications were encountered.  Please see procedure note for full details.    Her postpartum course has been unremarkable.  Antepartum H/H was 11.9/35.5, postpartum H/H 9.0/28.0.  She had no signs or symptoms of acute blood loss anemia.  She was ambulating well, voiding without difficulty and lochia was within normal limits. She was stable for and requesting discharge on POD #2.    DISCHARGE CONDITION: Stable    DISPOSITION: Home    DISCHARGE MEDICATIONS   Jerrica Marquez   Home Medication Instructions ANASTACIO:[id number]    Printed on:17 0843   Medication Information                      acetaminophen (TYLENOL) 500 MG tablet  Take 500 mg by mouth Every 6 (Six) Hours As Needed for Mild Pain .             docusate sodium (COLACE) 100 MG capsule  Take 1 capsule by mouth Daily As Needed for Constipation.             ferrous  gluconate (FERGON) 324 MG tablet  Take 1 tablet by mouth 3 (Three) Times a Day With Meals.             levothyroxine (SYNTHROID) 125 MCG tablet  Take one tablet daily with one extra tablet on Monday and Thursday.             oxyCODONE-acetaminophen (PERCOCET) 5-325 MG per tablet  Take 1 tablet by mouth Every 4 (Four) Hours As Needed for Moderate Pain  for up to 8 days.             Prenatal Vit-Fe Fumarate-FA (PRENATAL VITAMIN PO)  Take  by mouth Daily.             promethazine (PHENERGAN) 12.5 MG tablet  Take 1 tablet by mouth Every 6 (Six) Hours As Needed for Nausea or Vomiting.                 INSTRUCTIONS:  Activity: as tolerated  Diet: as tolerated  Special instructions: Precautions and instructions were discussed with her including but not limited to maintaining a regular diet at home, practicing local hygiene, pelvic rest and signs and symptoms to report including heavy vaginal bleeding, frequent passage of clots, foul odor of lochia, increased pain, fever or any other concerns.    FOLLOW UP:  Jayne Thibodeaux MD  Follow-up Information     Follow up with Jayne Thibodeaux MD .    Specialties:  Obstetrics, Obstetrics and Gynecology, Gynecology    Contact information:    [address] [place] [address]  2ND FLOOR  [facility] [address] [phone]            Rodolfo Ingram MD is the attending at time of discharge, he is aware of the patient's status and agrees with the above discharge summary.        This document has been electronically signed by Ramon Montanez MD on December 23, 2017 8:43 AM

## 2017-12-23 NOTE — PLAN OF CARE
Problem: Patient Care Overview (Adult)  Goal: Plan of Care Review  Outcome: Ongoing (interventions implemented as appropriate)   17 0437   Coping/Psychosocial Response Interventions   Plan Of Care Reviewed With patient   Patient Care Overview   Progress improving     Goal: Adult Individualization and Mutuality  Outcome: Ongoing (interventions implemented as appropriate)    Goal: Discharge Needs Assessment  Outcome: Ongoing (interventions implemented as appropriate)      Problem: Fall Risk  (Adult,Obstetrics,Pediatric)  Goal: Identify Related Risk Factors and Signs and Symptoms  Outcome: Ongoing (interventions implemented as appropriate)    Goal: Absence of Maternal Fall  Outcome: Ongoing (interventions implemented as appropriate)    Goal: Absence of  Fall/Drop  Outcome: Ongoing (interventions implemented as appropriate)      Problem: Postpartum ( Delivery) (Adult)  Goal: Signs and Symptoms of Listed Potential Problems Will be Absent or Manageable (Postpartum)  Outcome: Ongoing (interventions implemented as appropriate)

## 2018-01-08 ENCOUNTER — OFFICE VISIT (OUTPATIENT)
Dept: OBSTETRICS AND GYNECOLOGY | Facility: CLINIC | Age: 28
End: 2018-01-08

## 2018-01-08 VITALS — DIASTOLIC BLOOD PRESSURE: 79 MMHG | SYSTOLIC BLOOD PRESSURE: 124 MMHG | BODY MASS INDEX: 39.61 KG/M2 | WEIGHT: 238 LBS

## 2018-01-08 PROCEDURE — 99212 OFFICE O/P EST SF 10 MIN: CPT | Performed by: OBSTETRICS & GYNECOLOGY

## 2018-01-08 NOTE — PROGRESS NOTES
Subjective     Chief Complaint   Patient presents with   • Gynecologic Exam       Jerrica Marquez is a 27 y.o.  presents today for 2 week PP visit.  Patient had a PLTCS at 37 weeks for PreE with SF.  States she is doing well.  A little sore at first but improved with time.  No issues with urination or bowel movements.  Lochia has almost stopped.  Baby doing well.  Bottle feeding.  Denies feeling more sad or tearful than usual.     No changes to PMH, family or social history since last visit.  No new medications or allergies.     Objective      /79  Wt 108 kg (238 lb)  LMP 2017  Breastfeeding? No  BMI 39.61 kg/m2    Physical Exam  General:   Appears stated age, AAOx3, NAD   Abdomen: Soft, nttp, no masses palpated; incision healing well, no erythema or concern for infection   Extremities: No CT or edema bilaterally    Neurologic: CN II - XII grossly intact         Assessment/Plan     ASSESSMENT  1. Postpartum examination following  delivery        PLAN  1. PP visit  - Continue routine post op and post partum restrictions  - Incision healing well, continue routine wound care  - BPs normotensive today   - No concerns for PP Blues, signs and symptoms of PP Depression reviewed  - Declines PPFP  - RTC in 4 weeks for full PP visit.     Jayne Thibodeaux MD  2018

## 2018-01-22 ENCOUNTER — LAB (OUTPATIENT)
Dept: LAB | Facility: HOSPITAL | Age: 28
End: 2018-01-22

## 2018-01-22 DIAGNOSIS — E06.3 HASHIMOTO'S THYROIDITIS: ICD-10-CM

## 2018-01-22 DIAGNOSIS — E06.3 HASHIMOTO'S THYROIDITIS: Primary | ICD-10-CM

## 2018-01-22 LAB — TSH SERPL DL<=0.05 MIU/L-ACNC: 0.11 MIU/ML (ref 0.46–4.68)

## 2018-01-22 PROCEDURE — 84443 ASSAY THYROID STIM HORMONE: CPT

## 2018-01-22 PROCEDURE — 36415 COLL VENOUS BLD VENIPUNCTURE: CPT

## 2018-01-23 ENCOUNTER — TELEPHONE (OUTPATIENT)
Dept: ENDOCRINOLOGY | Facility: CLINIC | Age: 28
End: 2018-01-23

## 2018-02-05 ENCOUNTER — OFFICE VISIT (OUTPATIENT)
Dept: OBSTETRICS AND GYNECOLOGY | Facility: CLINIC | Age: 28
End: 2018-02-05

## 2018-02-05 VITALS
SYSTOLIC BLOOD PRESSURE: 136 MMHG | BODY MASS INDEX: 40.48 KG/M2 | WEIGHT: 243 LBS | HEIGHT: 65 IN | DIASTOLIC BLOOD PRESSURE: 82 MMHG

## 2018-02-05 PROCEDURE — 99212 OFFICE O/P EST SF 10 MIN: CPT | Performed by: OBSTETRICS & GYNECOLOGY

## 2018-02-05 PROCEDURE — 88142 CYTOPATH C/V THIN LAYER: CPT | Performed by: OBSTETRICS & GYNECOLOGY

## 2018-02-05 NOTE — PROGRESS NOTES
"Subjective   Chief Complaint   Patient presents with   • Postpartum Care      Jerrica Marquez is a 27 y.o. year old  presenting to be seen for her postpartum visit.  She had a PTLCS on .  She states she has continued to do well.  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  Denies feeling more sad or tearful than usual.  Hasn't had a period since.  No issue with urination or bowel movements.  Bottle feeding.      Since delivery she has not been sexually active.  She does not have concerns about post-partum blues/depression.   She is bottle feeding.    The following portions of the patient's history were reviewed and updated as appropriate:current medications and allergies    Smoking status: Never Smoker                                                              Smokeless status: Never Used                        Review of Systems - comprehensive ROS preformed and all negative.      Objective   /82  Ht 165.1 cm (65\")  Wt 110 kg (243 lb)  LMP 2017  BMI 40.44 kg/m2    General:  well developed; well nourished  no acute distress   Abdomen: soft, non-tender; no masses  no umbilical or inginual hernias are present  no hepato-splenomegaly   Pelvis: Clinical staff was present for exam  External genitalia:  normal appearance of the external genitalia including Bartholin's and Torboy's glands.  :  urethral meatus normal;  Vaginal:  normal pink mucosa without prolapse or lesions.  Cervix:  normal appearance. pap smear preformed          Assessment   1. Normal 6 week postpartum exam       Plan   1. Normal 6 week PP visit  - All post op and post partum restrictions lifted  - Incision healed well   - No concerns for PP Depression   - PP Pap today   - Declines PPFP  - RTC in 1 year for annual exam.      This note was electronically signed.    Jayne Thibodeaux MD  2018  "

## 2018-02-06 LAB
LAB AP CASE REPORT: NORMAL
LAB AP GYN ADDITIONAL INFORMATION: NORMAL
Lab: NORMAL
PATH INTERP SPEC-IMP: NORMAL
STAT OF ADQ CVX/VAG CYTO-IMP: NORMAL

## 2018-02-28 ENCOUNTER — TELEPHONE (OUTPATIENT)
Dept: ENDOCRINOLOGY | Facility: CLINIC | Age: 28
End: 2018-02-28

## 2018-02-28 ENCOUNTER — LAB (OUTPATIENT)
Dept: LAB | Facility: HOSPITAL | Age: 28
End: 2018-02-28

## 2018-02-28 DIAGNOSIS — E03.9 HYPOTHYROIDISM IN PREGNANCY, ANTEPARTUM, FIRST TRIMESTER: ICD-10-CM

## 2018-02-28 DIAGNOSIS — E06.3 HASHIMOTO'S THYROIDITIS: ICD-10-CM

## 2018-02-28 DIAGNOSIS — O99.281 HYPOTHYROIDISM IN PREGNANCY, ANTEPARTUM, FIRST TRIMESTER: ICD-10-CM

## 2018-02-28 LAB
T4 FREE SERPL-MCNC: 1.03 NG/DL (ref 0.78–2.19)
TSH SERPL DL<=0.05 MIU/L-ACNC: 3.72 MIU/ML (ref 0.46–4.68)

## 2018-02-28 PROCEDURE — 84439 ASSAY OF FREE THYROXINE: CPT | Performed by: OBSTETRICS & GYNECOLOGY

## 2018-02-28 PROCEDURE — 36415 COLL VENOUS BLD VENIPUNCTURE: CPT | Performed by: OBSTETRICS & GYNECOLOGY

## 2018-02-28 PROCEDURE — 84443 ASSAY THYROID STIM HORMONE: CPT

## 2018-04-09 ENCOUNTER — TELEPHONE (OUTPATIENT)
Dept: ENDOCRINOLOGY | Facility: CLINIC | Age: 28
End: 2018-04-09

## 2018-04-09 ENCOUNTER — OFFICE VISIT (OUTPATIENT)
Dept: ENDOCRINOLOGY | Facility: CLINIC | Age: 28
End: 2018-04-09

## 2018-04-09 ENCOUNTER — APPOINTMENT (OUTPATIENT)
Dept: LAB | Facility: HOSPITAL | Age: 28
End: 2018-04-09

## 2018-04-09 VITALS
HEART RATE: 86 BPM | HEIGHT: 65 IN | WEIGHT: 253 LBS | DIASTOLIC BLOOD PRESSURE: 78 MMHG | BODY MASS INDEX: 42.15 KG/M2 | SYSTOLIC BLOOD PRESSURE: 124 MMHG

## 2018-04-09 DIAGNOSIS — E06.3 HASHIMOTO'S THYROIDITIS: Primary | ICD-10-CM

## 2018-04-09 DIAGNOSIS — IMO0001 CLASS 3 OBESITY WITH BODY MASS INDEX (BMI) OF 40.0 TO 44.9 IN ADULT, UNSPECIFIED OBESITY TYPE, UNSPECIFIED WHETHER SERIOUS COMORBIDITY PRESENT: ICD-10-CM

## 2018-04-09 DIAGNOSIS — E55.9 VITAMIN D DEFICIENCY: ICD-10-CM

## 2018-04-09 LAB
25(OH)D3 SERPL-MCNC: 22.7 NG/ML (ref 30–100)
ALBUMIN SERPL-MCNC: 4.1 G/DL (ref 3.4–4.8)
ALBUMIN/GLOB SERPL: 1.1 G/DL (ref 1.1–1.8)
ALP SERPL-CCNC: 83 U/L (ref 38–126)
ALT SERPL W P-5'-P-CCNC: 43 U/L (ref 9–52)
ANION GAP SERPL CALCULATED.3IONS-SCNC: 14 MMOL/L (ref 5–15)
AST SERPL-CCNC: 39 U/L (ref 14–36)
BASOPHILS # BLD AUTO: 0.01 10*3/MM3 (ref 0–0.2)
BASOPHILS NFR BLD AUTO: 0.1 % (ref 0–2)
BILIRUB SERPL-MCNC: 0.4 MG/DL (ref 0.2–1.3)
BUN BLD-MCNC: 12 MG/DL (ref 7–21)
BUN/CREAT SERPL: 16.9 (ref 7–25)
CALCIUM SPEC-SCNC: 9 MG/DL (ref 8.4–10.2)
CHLORIDE SERPL-SCNC: 101 MMOL/L (ref 95–110)
CO2 SERPL-SCNC: 25 MMOL/L (ref 22–31)
CREAT BLD-MCNC: 0.71 MG/DL (ref 0.5–1)
DEPRECATED RDW RBC AUTO: 41.5 FL (ref 36.4–46.3)
EOSINOPHIL # BLD AUTO: 0.09 10*3/MM3 (ref 0–0.7)
EOSINOPHIL NFR BLD AUTO: 1.2 % (ref 0–7)
ERYTHROCYTE [DISTWIDTH] IN BLOOD BY AUTOMATED COUNT: 14.1 % (ref 11.5–14.5)
GFR SERPL CREATININE-BSD FRML MDRD: 100 ML/MIN/1.73 (ref 71–165)
GLOBULIN UR ELPH-MCNC: 3.6 GM/DL (ref 2.3–3.5)
GLUCOSE BLD-MCNC: 76 MG/DL (ref 60–100)
HCT VFR BLD AUTO: 37.9 % (ref 35–45)
HGB BLD-MCNC: 12.9 G/DL (ref 12–15.5)
IMM GRANULOCYTES # BLD: 0.02 10*3/MM3 (ref 0–0.02)
IMM GRANULOCYTES NFR BLD: 0.3 % (ref 0–0.5)
LYMPHOCYTES # BLD AUTO: 1.85 10*3/MM3 (ref 0.6–4.2)
LYMPHOCYTES NFR BLD AUTO: 25.5 % (ref 10–50)
MCH RBC QN AUTO: 27.5 PG (ref 26.5–34)
MCHC RBC AUTO-ENTMCNC: 34 G/DL (ref 31.4–36)
MCV RBC AUTO: 80.8 FL (ref 80–98)
MONOCYTES # BLD AUTO: 0.58 10*3/MM3 (ref 0–0.9)
MONOCYTES NFR BLD AUTO: 8 % (ref 0–12)
NEUTROPHILS # BLD AUTO: 4.7 10*3/MM3 (ref 2–8.6)
NEUTROPHILS NFR BLD AUTO: 64.9 % (ref 37–80)
PLATELET # BLD AUTO: 254 10*3/MM3 (ref 150–450)
PMV BLD AUTO: 9.5 FL (ref 8–12)
POTASSIUM BLD-SCNC: 4.6 MMOL/L (ref 3.5–5.1)
PROT SERPL-MCNC: 7.7 G/DL (ref 6.3–8.6)
RBC # BLD AUTO: 4.69 10*6/MM3 (ref 3.77–5.16)
SODIUM BLD-SCNC: 140 MMOL/L (ref 137–145)
T4 FREE SERPL-MCNC: 1.06 NG/DL (ref 0.78–2.19)
TSH SERPL DL<=0.05 MIU/L-ACNC: 3.06 MIU/ML (ref 0.46–4.68)
WBC NRBC COR # BLD: 7.25 10*3/MM3 (ref 3.2–9.8)

## 2018-04-09 PROCEDURE — 84439 ASSAY OF FREE THYROXINE: CPT | Performed by: NURSE PRACTITIONER

## 2018-04-09 PROCEDURE — 82306 VITAMIN D 25 HYDROXY: CPT | Performed by: NURSE PRACTITIONER

## 2018-04-09 PROCEDURE — 84443 ASSAY THYROID STIM HORMONE: CPT | Performed by: NURSE PRACTITIONER

## 2018-04-09 PROCEDURE — 85025 COMPLETE CBC W/AUTO DIFF WBC: CPT | Performed by: NURSE PRACTITIONER

## 2018-04-09 PROCEDURE — 80053 COMPREHEN METABOLIC PANEL: CPT | Performed by: NURSE PRACTITIONER

## 2018-04-09 PROCEDURE — 36415 COLL VENOUS BLD VENIPUNCTURE: CPT | Performed by: NURSE PRACTITIONER

## 2018-04-09 PROCEDURE — 99214 OFFICE O/P EST MOD 30 MIN: CPT | Performed by: NURSE PRACTITIONER

## 2018-04-09 NOTE — PROGRESS NOTES
Subjective    Jerrica Marquez is a 27 y.o. female. she is here today for follow-up.    History of Present Illness         27 year old comes for follow up      reason - hypothyroidism,due to Hashimoto's     timing - constant     quality - not controlled     severity - moderate     symptoms - 60 + lb weight gain, fatigue, hair loss, constipation, arthralgias     alleviating - levothyroxine 100 mcgs daily and on this dose TSH is 5     Delivered baby  2017      --     obesity   despite exercise  following 1800 calorie diet     --     h o vit D Deficiency, not on supplements            Evaluation history:  TSH   Date Value Ref Range Status   2018 3.720 0.460 - 4.680 mIU/mL Final     Free T4   Date Value Ref Range Status   2018 1.03 0.78 - 2.19 ng/dL Final       Current medications:  Current Outpatient Prescriptions   Medication Sig Dispense Refill   • levothyroxine (SYNTHROID) 125 MCG tablet Take one tablet daily with one extra tablet on Monday and Thursday. 40 tablet 11   • Prenatal Vit-Fe Fumarate-FA (PRENATAL VITAMIN PO) Take  by mouth Daily.       No current facility-administered medications for this visit.        The following portions of the patient's history were reviewed and updated as appropriate:   Past Medical History:   Diagnosis Date   • Abdominal pain     generalized cramping after eating wheat type products   • Acquired hypothyroidism     Secondary to Hashimoto's thyroiditis   • Dysmenorrhea    • Endogenous obesity    • Excessive or frequent menstruation    • Hashimoto's thyroiditis    • Headache    • Hypertension 2017    gestational hypertension   • Myopia    • Vitamin D deficiency      Past Surgical History:   Procedure Laterality Date   •  SECTION N/A 2017    Procedure:  SECTION PRIMARY;  Surgeon: Jayne Thibodeaux MD;  Location: [facility] LABOR DELIVERY;  Service:    • INJECTION OF MEDICATION  2013    Depo Provera (medroxyprogesterone acetate)    • INJECTION OF MEDICATION  2012    Kenalog x2   • INJECTION OF MEDICATION  2015    Toradol   • INJECTION OF MEDICATION  2015    Zofran   • WISDOM TOOTH EXTRACTION       Family History   Problem Relation Age of Onset   • Asthma Other    • Hypertension Other    • Other Other      Thyroid Disorder, Depressive Disorder, Smoking Tobacco, Anxiety   • Rheum arthritis Mother    • Hypertension Father    • Heart disease Maternal Grandfather    • Hypertension Maternal Grandfather      OB History      Para Term  AB Living    1 1 1   1    SAB TAB Ectopic Molar Multiple Live Births        0 1        Allergies   Allergen Reactions   • Cefzil [Cefprozil] Hives   • Celexa [Citalopram]      Patient states that she is not allergic to this med. It causes fatigue   • Influenza Vaccines      Migraines/ vomiting has reaction 2015- not in past      Social History     Social History   • Marital status:      Social History Main Topics   • Smoking status: Never Smoker   • Smokeless tobacco: Never Used   • Alcohol use No   • Drug use: No   • Sexual activity: Yes     Partners: Male     Other Topics Concern   • Not on file       Review of Systems  Review of Systems   Constitutional: Positive for fatigue and unexpected weight change. Negative for activity change, appetite change and diaphoresis.   HENT: Negative for facial swelling, sneezing, sore throat, tinnitus, trouble swallowing and voice change.    Eyes: Negative for photophobia, pain, discharge, redness, itching and visual disturbance.   Respiratory: Negative for apnea, cough, choking, chest tightness and shortness of breath.    Cardiovascular: Negative for chest pain, palpitations and leg swelling.   Gastrointestinal: Negative for abdominal distention, abdominal pain, constipation, diarrhea, nausea and vomiting.   Endocrine: Negative for cold intolerance, heat intolerance, polydipsia, polyphagia and polyuria.   Genitourinary: Negative for  "difficulty urinating, dysuria, frequency, hematuria and urgency.   Musculoskeletal: Negative for arthralgias, back pain, gait problem, joint swelling, myalgias, neck pain and neck stiffness.   Skin: Negative for color change, pallor, rash and wound.        Hair thinning   Neurological: Negative for dizziness, tremors, weakness, light-headedness, numbness and headaches.   Hematological: Negative for adenopathy. Does not bruise/bleed easily.   Psychiatric/Behavioral: Negative for behavioral problems, confusion and sleep disturbance.        Objective    /78 (BP Location: Left arm, Patient Position: Sitting, Cuff Size: Adult)   Pulse 86   Ht 165.1 cm (65\")   Wt 115 kg (253 lb)   BMI 42.10 kg/m²   Physical Exam   Constitutional: She is oriented to person, place, and time. She appears well-developed and well-nourished. No distress.   HENT:   Head: Normocephalic and atraumatic.   Right Ear: External ear normal.   Left Ear: External ear normal.   Nose: Nose normal.   Eyes: Conjunctivae and EOM are normal. Pupils are equal, round, and reactive to light.   Neck: Normal range of motion. Neck supple. No tracheal deviation present. No thyromegaly present.   Cardiovascular: Normal rate, regular rhythm and normal heart sounds.    No murmur heard.  Pulmonary/Chest: Effort normal and breath sounds normal. No respiratory distress. She has no wheezes.   Abdominal: Soft. Bowel sounds are normal. There is no tenderness. There is no rebound and no guarding.   Musculoskeletal: Normal range of motion. She exhibits no edema, tenderness or deformity.   Neurological: She is alert and oriented to person, place, and time. No cranial nerve deficit.   Skin: Skin is warm and dry. No rash noted.   Psychiatric: She has a normal mood and affect. Her behavior is normal. Judgment and thought content normal.       Lab Review  Lab Results   Component Value Date    TSH 3.720 02/28/2018     Lab Results   Component Value Date    FREET4 1.03 " 02/28/2018        Assessment/Plan      1. Hashimoto's thyroiditis    2. Vitamin D deficiency    3. Class 3 obesity with body mass index (BMI) of 40.0 to 44.9 in adult, unspecified obesity type, unspecified whether serious comorbidity present    . This diagnosis was discussed and reviewed with the patient including the advantages of drug therapy.     1. Orders placed during this encounter include:  Orders Placed This Encounter   Procedures   • Comprehensive Metabolic Panel   • TSH   • Vitamin D 25 Hydroxy   • T4, Free   • CBC & Differential     Order Specific Question:   Manual Differential     Answer:   No       Medications prescribed:  Outpatient Encounter Prescriptions as of 4/9/2018   Medication Sig Dispense Refill   • levothyroxine (SYNTHROID) 125 MCG tablet Take one tablet daily with one extra tablet on Monday and Thursday. 40 tablet 11   • Prenatal Vit-Fe Fumarate-FA (PRENATAL VITAMIN PO) Take  by mouth Daily.       No facility-administered encounter medications on file as of 4/9/2018.      Plan Details  Hypothyroidism        on Levothyroxine 125 mcgs one daily     June 2015     TSH - 2.07  no change in dosage     ( previouly on levothyroxine and cytomel did not like the cytomel)     repeat labs today     Will call with results         Variability TSH -- submit for brand name synthroid      When she becomes pregnant she is to call the office immediately        Levothyroxine 125 mcg one daily except Monday and Thursday take 2 tablet            Lab Results   Component Value Date     TSH 1.710 11/22/2017      Levothyroxine 125 mcg one daily except Thursdays take 2      Lab Results   Component Value Date    TSH 3.720 02/28/2018       Take 125 mcg one daily          ====     Vit D Def , start 50 th q 2 w     Was taking     She ran out about one month ago      ====     B12 nl        --------------     stomach pain after eating breads and other foods     will check for celiac --negative      Weight Management      Dietary changes discussed    BMI - 42.1     Does not want any type of dietary medications     Will try to exercise      Hand out given on exercise          4. Return in about 4 months (around 8/9/2018) for Recheck.

## 2018-04-09 NOTE — TELEPHONE ENCOUNTER
----- Message from REBECA Blancas sent at 4/9/2018  2:37 PM CDT -----  Thyroid level is 3.060 -- keep same dose of thyroid medication but take an extra pill one day a week; do labs in 4 weeks and I will call

## 2018-06-29 RX ORDER — NORGESTIMATE AND ETHINYL ESTRADIOL 7DAYSX3 28
1 KIT ORAL DAILY
Qty: 28 TABLET | Refills: 12 | Status: SHIPPED | OUTPATIENT
Start: 2018-06-29 | End: 2018-08-10

## 2018-07-12 ENCOUNTER — TELEPHONE (OUTPATIENT)
Dept: OBSTETRICS AND GYNECOLOGY | Facility: CLINIC | Age: 28
End: 2018-07-12

## 2018-07-12 RX ORDER — NORGESTIMATE AND ETHINYL ESTRADIOL 0.25-0.035
1 KIT ORAL DAILY
Qty: 28 TABLET | Refills: 12 | Status: SHIPPED | OUTPATIENT
Start: 2018-07-12 | End: 2018-09-10 | Stop reason: HOSPADM

## 2018-07-12 NOTE — TELEPHONE ENCOUNTER
----- Message from Luiza Aguilar sent at 7/12/2018 10:10 AM CDT -----  Contact: [phone]  Pt began experiencing hives since she has started taking the second week dose. They are worse within 30 minutes of taking the pills. Pt would like sprintec called into [facility] in [place].         Called pt informed her that on 6/29 sprintec with 12 refills was called into her pharmacy in [place] pt states that its not what the pharmacy gave her I recommended contacting the pharmacy and see if it was an error on their part of they have changed brands if not pt is to call the office back she verbalized understanding and agreed with this plan

## 2018-07-31 ENCOUNTER — APPOINTMENT (OUTPATIENT)
Dept: ULTRASOUND IMAGING | Facility: HOSPITAL | Age: 28
End: 2018-07-31

## 2018-07-31 ENCOUNTER — TELEPHONE (OUTPATIENT)
Dept: OBSTETRICS AND GYNECOLOGY | Facility: CLINIC | Age: 28
End: 2018-07-31

## 2018-07-31 ENCOUNTER — HOSPITAL ENCOUNTER (EMERGENCY)
Facility: HOSPITAL | Age: 28
Discharge: HOME | End: 2018-07-31
Attending: FAMILY MEDICINE | Admitting: FAMILY MEDICINE

## 2018-07-31 VITALS
RESPIRATION RATE: 18 BRPM | BODY MASS INDEX: 41.99 KG/M2 | WEIGHT: 252 LBS | HEIGHT: 65 IN | TEMPERATURE: 98.9 F | DIASTOLIC BLOOD PRESSURE: 75 MMHG | HEART RATE: 80 BPM | SYSTOLIC BLOOD PRESSURE: 143 MMHG | OXYGEN SATURATION: 99 %

## 2018-07-31 DIAGNOSIS — N93.9 ABNORMAL VAGINAL BLEEDING: Primary | ICD-10-CM

## 2018-07-31 DIAGNOSIS — R93.89 ENDOMETRIAL THICKENING ON ULTRA SOUND: ICD-10-CM

## 2018-07-31 LAB
ALBUMIN SERPL-MCNC: 4 G/DL (ref 3.4–4.8)
ALBUMIN/GLOB SERPL: 1.3 G/DL (ref 1.1–1.8)
ALP SERPL-CCNC: 59 U/L (ref 38–126)
ALT SERPL W P-5'-P-CCNC: 37 U/L (ref 9–52)
ANION GAP SERPL CALCULATED.3IONS-SCNC: 7 MMOL/L (ref 5–15)
AST SERPL-CCNC: 27 U/L (ref 14–36)
B-HCG UR QL: NEGATIVE
BACTERIA UR QL AUTO: ABNORMAL /HPF
BASOPHILS # BLD AUTO: 0.02 10*3/MM3 (ref 0–0.2)
BASOPHILS NFR BLD AUTO: 0.3 % (ref 0–2)
BILIRUB SERPL-MCNC: 0.2 MG/DL (ref 0.2–1.3)
BILIRUB UR QL STRIP: NEGATIVE
BUN BLD-MCNC: 12 MG/DL (ref 7–21)
BUN/CREAT SERPL: 13.3 (ref 7–25)
CALCIUM SPEC-SCNC: 8.3 MG/DL (ref 8.4–10.2)
CHLORIDE SERPL-SCNC: 107 MMOL/L (ref 95–110)
CLARITY UR: CLEAR
CO2 SERPL-SCNC: 25 MMOL/L (ref 22–31)
COLOR UR: YELLOW
CREAT BLD-MCNC: 0.9 MG/DL (ref 0.5–1)
DEPRECATED RDW RBC AUTO: 40.7 FL (ref 36.4–46.3)
EOSINOPHIL # BLD AUTO: 0.15 10*3/MM3 (ref 0–0.7)
EOSINOPHIL NFR BLD AUTO: 2.1 % (ref 0–7)
ERYTHROCYTE [DISTWIDTH] IN BLOOD BY AUTOMATED COUNT: 13.4 % (ref 11.5–14.5)
GFR SERPL CREATININE-BSD FRML MDRD: 75 ML/MIN/1.73 (ref 71–165)
GLOBULIN UR ELPH-MCNC: 3.2 GM/DL (ref 2.3–3.5)
GLUCOSE BLD-MCNC: 94 MG/DL (ref 60–100)
GLUCOSE UR STRIP-MCNC: NEGATIVE MG/DL
HCT VFR BLD AUTO: 29.3 % (ref 35–45)
HGB BLD-MCNC: 10 G/DL (ref 12–15.5)
HGB UR QL STRIP.AUTO: ABNORMAL
HYALINE CASTS UR QL AUTO: ABNORMAL /LPF
IMM GRANULOCYTES # BLD: 0.02 10*3/MM3 (ref 0–0.02)
IMM GRANULOCYTES NFR BLD: 0.3 % (ref 0–0.5)
KETONES UR QL STRIP: NEGATIVE
LEUKOCYTE ESTERASE UR QL STRIP.AUTO: NEGATIVE
LYMPHOCYTES # BLD AUTO: 2.17 10*3/MM3 (ref 0.6–4.2)
LYMPHOCYTES NFR BLD AUTO: 30.5 % (ref 10–50)
MCH RBC QN AUTO: 28.2 PG (ref 26.5–34)
MCHC RBC AUTO-ENTMCNC: 34.1 G/DL (ref 31.4–36)
MCV RBC AUTO: 82.5 FL (ref 80–98)
MONOCYTES # BLD AUTO: 0.51 10*3/MM3 (ref 0–0.9)
MONOCYTES NFR BLD AUTO: 7.2 % (ref 0–12)
NEUTROPHILS # BLD AUTO: 4.25 10*3/MM3 (ref 2–8.6)
NEUTROPHILS NFR BLD AUTO: 59.6 % (ref 37–80)
NITRITE UR QL STRIP: NEGATIVE
PH UR STRIP.AUTO: 6 [PH] (ref 5–9)
PLATELET # BLD AUTO: 330 10*3/MM3 (ref 150–450)
PMV BLD AUTO: 9.2 FL (ref 8–12)
POTASSIUM BLD-SCNC: 3.9 MMOL/L (ref 3.5–5.1)
PROT SERPL-MCNC: 7.2 G/DL (ref 6.3–8.6)
PROT UR QL STRIP: ABNORMAL
RBC # BLD AUTO: 3.55 10*6/MM3 (ref 3.77–5.16)
RBC # UR: ABNORMAL /HPF
REF LAB TEST METHOD: ABNORMAL
SODIUM BLD-SCNC: 139 MMOL/L (ref 137–145)
SP GR UR STRIP: 1.04 (ref 1–1.03)
SQUAMOUS #/AREA URNS HPF: ABNORMAL /HPF
UROBILINOGEN UR QL STRIP: ABNORMAL
WBC NRBC COR # BLD: 7.12 10*3/MM3 (ref 3.2–9.8)
WBC UR QL AUTO: ABNORMAL /HPF

## 2018-07-31 PROCEDURE — 76830 TRANSVAGINAL US NON-OB: CPT

## 2018-07-31 PROCEDURE — 96360 HYDRATION IV INFUSION INIT: CPT

## 2018-07-31 PROCEDURE — 80053 COMPREHEN METABOLIC PANEL: CPT | Performed by: PHYSICIAN ASSISTANT

## 2018-07-31 PROCEDURE — 81001 URINALYSIS AUTO W/SCOPE: CPT | Performed by: PHYSICIAN ASSISTANT

## 2018-07-31 PROCEDURE — 85025 COMPLETE CBC W/AUTO DIFF WBC: CPT | Performed by: PHYSICIAN ASSISTANT

## 2018-07-31 PROCEDURE — 81025 URINE PREGNANCY TEST: CPT | Performed by: PHYSICIAN ASSISTANT

## 2018-07-31 PROCEDURE — 99283 EMERGENCY DEPT VISIT LOW MDM: CPT

## 2018-07-31 RX ORDER — SODIUM CHLORIDE 0.9 % (FLUSH) 0.9 %
10 SYRINGE (ML) INJECTION AS NEEDED
Status: DISCONTINUED | OUTPATIENT
Start: 2018-07-31 | End: 2018-07-31 | Stop reason: HOSPADM

## 2018-07-31 RX ORDER — MEDROXYPROGESTERONE ACETATE 10 MG/1
10 TABLET ORAL DAILY
Qty: 10 TABLET | Refills: 0 | Status: SHIPPED | OUTPATIENT
Start: 2018-07-31 | End: 2018-08-10

## 2018-07-31 RX ADMIN — SODIUM CHLORIDE 1000 ML: 900 INJECTION, SOLUTION INTRAVENOUS at 18:40

## 2018-07-31 NOTE — TELEPHONE ENCOUNTER
----- Message from Rena Clarke LPN sent at 7/31/2018  8:46 AM CDT -----  Regarding: FW: bleedingfriday pt/bleeding  Contact: [phone]  Pt is having some irregular bleeding, she stated that  Friibrahima normally gives her provera for this.  I didn't see where it had been given for over a year now.  ----- Message -----  From: Tena Win  Sent: 7/31/2018   8:09 AM  To: Rena Clarke LPN  Subject: bleedingfriday pt/bleeding                       Friday pt/bleeding...      Pt called the office stating that she was bleeding more than a pad an hour I recommended her be seen In the er

## 2018-08-01 NOTE — ED PROVIDER NOTES
Subjective   Patient states that she had a csection this past December without complications. She had one menstrual cycle after her csection and has not had one since then. Reports that this is not abnormal for her, that her menstrual cycles have always been very irregular. States that when she does eventually have one that it is like this and she bleeds very heavily and for several weeks. Denies any abdominal pain. Denies dizziness, lightheaded, or syncopal episodes. She has taken progesterone before to stop bleeding but it has been over a year since she has had to do that because she got pregnant and did not have to worry about it for a while.         History provided by:  Patient   used: No    Vaginal Bleeding   Quality:  Heavier than menses  Severity:  Moderate  Onset quality:  Gradual  Duration:  3 weeks  Timing:  Intermittent  Progression:  Unchanged  Chronicity:  Recurrent  Menstrual history:  Irregular  Possible pregnancy: no    Context: at rest and spontaneously    Context: not after intercourse, not after urination, not during intercourse, not during urination, not foreign body and not genital trauma    Relieved by:  Nothing  Worsened by:  Nothing  Ineffective treatments:  None tried  Associated symptoms: no abdominal pain, no back pain, no dizziness, no dyspareunia, no dysuria, no fatigue, no fever, no nausea and no vaginal discharge    Risk factors: no bleeding disorder, no hx of ectopic pregnancy, no hx of endometriosis, no gynecological surgery, does not have multiple partners, no new sexual partner, no ovarian cysts, no ovarian torsion, no PID, no prior miscarriage, no STD, no STD exposure, no terminated pregnancies and does not have unprotected sex        Review of Systems   Constitutional: Negative.  Negative for fatigue and fever.   HENT: Negative.    Eyes: Negative.    Respiratory: Negative.    Cardiovascular: Negative.    Gastrointestinal: Negative.  Negative for abdominal  pain and nausea.   Endocrine: Negative.    Genitourinary: Positive for menstrual problem and vaginal bleeding. Negative for decreased urine volume, difficulty urinating, dyspareunia, dysuria, enuresis, flank pain, frequency, genital sores, hematuria, pelvic pain, urgency, vaginal discharge and vaginal pain.   Musculoskeletal: Negative.  Negative for back pain.   Skin: Negative.    Allergic/Immunologic: Negative.    Neurological: Negative.  Negative for dizziness.   Hematological: Negative.    Psychiatric/Behavioral: Negative.        Past Medical History:   Diagnosis Date   • Abdominal pain     generalized cramping after eating wheat type products   • Acquired hypothyroidism     Secondary to Hashimoto's thyroiditis   • Dysmenorrhea    • Endogenous obesity    • Excessive or frequent menstruation    • Hashimoto's thyroiditis    • Headache    • Hypertension 2017    gestational hypertension   • Myopia    • Vitamin D deficiency        Allergies   Allergen Reactions   • Cefzil [Cefprozil] Hives   • Celexa [Citalopram]      Patient states that she is not allergic to this med. It causes fatigue   • Influenza Vaccines      Migraines/ vomiting has reaction 2015- not in past        Past Surgical History:   Procedure Laterality Date   •  SECTION N/A 2017    Procedure:  SECTION PRIMARY;  Surgeon: Jayne Thibodeaux MD;  Location: [facility] LABOR DELIVERY;  Service:    • INJECTION OF MEDICATION  2013    Depo Provera (medroxyprogesterone acetate)   • INJECTION OF MEDICATION  2012    Kenalog x2   • INJECTION OF MEDICATION  2015    Toradol   • INJECTION OF MEDICATION  2015    Zofran   • WISDOM TOOTH EXTRACTION         Family History   Problem Relation Age of Onset   • Asthma Other    • Hypertension Other    • Other Other         Thyroid Disorder, Depressive Disorder, Smoking Tobacco, Anxiety   • Rheum arthritis Mother    • Hypertension Father    • Heart disease Maternal Grandfather    •  Hypertension Maternal Grandfather        Social History     Social History   • Marital status:      Social History Main Topics   • Smoking status: Never Smoker   • Smokeless tobacco: Never Used   • Alcohol use No   • Drug use: No   • Sexual activity: Yes     Partners: Male     Other Topics Concern   • Not on file           Objective   Physical Exam   Constitutional: She is oriented to person, place, and time. She appears well-developed and well-nourished. No distress.   HENT:   Head: Normocephalic and atraumatic.   Mouth/Throat: No oropharyngeal exudate.   Eyes: Pupils are equal, round, and reactive to light. Conjunctivae and EOM are normal. Right eye exhibits no discharge. Left eye exhibits no discharge. No scleral icterus.   Neck: Normal range of motion. Neck supple. No JVD present. No tracheal deviation present. No thyromegaly present.   Cardiovascular: Normal rate, regular rhythm, normal heart sounds and intact distal pulses.  Exam reveals no gallop and no friction rub.    No murmur heard.  Pulmonary/Chest: Effort normal and breath sounds normal. No stridor. No respiratory distress. She has no wheezes. She has no rales. She exhibits no tenderness.   Abdominal: Soft. Bowel sounds are normal. She exhibits no distension and no mass. There is no tenderness. There is no rebound and no guarding. No hernia.   Musculoskeletal: Normal range of motion. She exhibits no edema, tenderness or deformity.   Lymphadenopathy:     She has no cervical adenopathy.   Neurological: She is alert and oriented to person, place, and time. She has normal reflexes. She displays normal reflexes. No cranial nerve deficit or sensory deficit. She exhibits normal muscle tone. Coordination normal.   Skin: Skin is warm and dry. Capillary refill takes less than 2 seconds. No rash noted. She is not diaphoretic. No erythema. No pallor.   Psychiatric: She has a normal mood and affect. Her behavior is normal. Judgment and thought content normal.    Nursing note and vitals reviewed.      Procedures           ED Course      Labs Reviewed   COMPREHENSIVE METABOLIC PANEL - Abnormal; Notable for the following:        Result Value    Calcium 8.3 (*)     All other components within normal limits   URINALYSIS W/ MICROSCOPIC IF INDICATED (NO CULTURE) - Abnormal; Notable for the following:     Specific Gravity, UA 1.036 (*)     Blood, UA Moderate (2+) (*)     Protein, UA Trace (*)     All other components within normal limits   CBC WITH AUTO DIFFERENTIAL - Abnormal; Notable for the following:     RBC 3.55 (*)     Hemoglobin 10.0 (*)     Hematocrit 29.3 (*)     All other components within normal limits   URINALYSIS, MICROSCOPIC ONLY - Abnormal; Notable for the following:     RBC, UA 3-5 (*)     All other components within normal limits   PREGNANCY, URINE - Normal   CBC AND DIFFERENTIAL    Narrative:     The following orders were created for panel order CBC & Differential.  Procedure                               Abnormality         Status                     ---------                               -----------         ------                     CBC Auto Differential[[id number]]        Abnormal            Final result                 Please view results for these tests on the individual orders.     Us Non-ob Transvaginal    Result Date: 7/31/2018  Narrative: Transvaginal pelvic ultrasound non-OB complete HISTORY: Abnormal vaginal bleeding Transvaginal ultrasound of the pelvis was performed. COMPARISON: December 16, 2016. The uterus is enlarged. The uterus measures 11.33 cm in length by 6.23 cm AP by 6.77 cm transverse. Endometrium thickened to 22.8 mm Multiple less than 1 cm follicular cysts right ovary. Unremarkable left ovary. Right ovary 3.28 cm x 2.43 cm x 2.34 cm with a volume of 9.75 mL. Left ovary 2.20 cm x 2.10 cm x 1.93 cm with a volume of 4.67 mL. Very small amount of free fluid in the cul-de-sac.     Impression: CONCLUSION: Enlarged uterus. Endometrium  thickened to 22.8 mm. Very small amount of free fluid in the cul-de-sac. [id number] Electronically signed by:  Jonah Aquino MD  7/31/2018 6:38 PM CDT Workstation: [id number]    Will start patient on short course of provera for endometrial thickening and abnormal bleeding. Patient advised to follow up with  Friday for further management. Patient advised to return to ED for worsening or failure to resolve of symptoms. Patient verbalized understanding and agrees with treatment plan.             MDM      Final diagnoses:   Abnormal vaginal bleeding   Endometrial thickening on ultra sound            FRANSISCA Fenton  07/31/18 1928

## 2018-08-07 ENCOUNTER — TELEPHONE (OUTPATIENT)
Dept: ENDOCRINOLOGY | Facility: CLINIC | Age: 28
End: 2018-08-07

## 2018-08-07 ENCOUNTER — OFFICE VISIT (OUTPATIENT)
Dept: ENDOCRINOLOGY | Facility: CLINIC | Age: 28
End: 2018-08-07

## 2018-08-07 ENCOUNTER — APPOINTMENT (OUTPATIENT)
Dept: LAB | Facility: HOSPITAL | Age: 28
End: 2018-08-07

## 2018-08-07 VITALS
DIASTOLIC BLOOD PRESSURE: 86 MMHG | HEIGHT: 65 IN | BODY MASS INDEX: 41.82 KG/M2 | SYSTOLIC BLOOD PRESSURE: 142 MMHG | HEART RATE: 97 BPM | WEIGHT: 251 LBS

## 2018-08-07 DIAGNOSIS — IMO0001 CLASS 3 OBESITY WITH BODY MASS INDEX (BMI) OF 40.0 TO 44.9 IN ADULT, UNSPECIFIED OBESITY TYPE, UNSPECIFIED WHETHER SERIOUS COMORBIDITY PRESENT: ICD-10-CM

## 2018-08-07 DIAGNOSIS — E55.9 VITAMIN D DEFICIENCY: ICD-10-CM

## 2018-08-07 DIAGNOSIS — E06.3 HASHIMOTO'S THYROIDITIS: Primary | ICD-10-CM

## 2018-08-07 LAB
25(OH)D3 SERPL-MCNC: 23.8 NG/ML (ref 30–100)
ALBUMIN SERPL-MCNC: 4 G/DL (ref 3.4–4.8)
ALBUMIN/GLOB SERPL: 1.2 G/DL (ref 1.1–1.8)
ALP SERPL-CCNC: 56 U/L (ref 38–126)
ALT SERPL W P-5'-P-CCNC: 23 U/L (ref 9–52)
ANION GAP SERPL CALCULATED.3IONS-SCNC: 7 MMOL/L (ref 5–15)
AST SERPL-CCNC: 28 U/L (ref 14–36)
BASOPHILS # BLD AUTO: 0.03 10*3/MM3 (ref 0–0.2)
BASOPHILS NFR BLD AUTO: 0.6 % (ref 0–2)
BILIRUB SERPL-MCNC: 0.4 MG/DL (ref 0.2–1.3)
BUN BLD-MCNC: 10 MG/DL (ref 7–21)
BUN/CREAT SERPL: 12.7 (ref 7–25)
CALCIUM SPEC-SCNC: 8.6 MG/DL (ref 8.4–10.2)
CHLORIDE SERPL-SCNC: 107 MMOL/L (ref 95–110)
CO2 SERPL-SCNC: 25 MMOL/L (ref 22–31)
CREAT BLD-MCNC: 0.79 MG/DL (ref 0.5–1)
DEPRECATED RDW RBC AUTO: 40.7 FL (ref 36.4–46.3)
EOSINOPHIL # BLD AUTO: 0.11 10*3/MM3 (ref 0–0.7)
EOSINOPHIL NFR BLD AUTO: 2 % (ref 0–7)
ERYTHROCYTE [DISTWIDTH] IN BLOOD BY AUTOMATED COUNT: 13.4 % (ref 11.5–14.5)
GFR SERPL CREATININE-BSD FRML MDRD: 87 ML/MIN/1.73 (ref 71–165)
GLOBULIN UR ELPH-MCNC: 3.3 GM/DL (ref 2.3–3.5)
GLUCOSE BLD-MCNC: 83 MG/DL (ref 60–100)
HCT VFR BLD AUTO: 27.4 % (ref 35–45)
HGB BLD-MCNC: 8.9 G/DL (ref 12–15.5)
IMM GRANULOCYTES # BLD: 0.02 10*3/MM3 (ref 0–0.02)
IMM GRANULOCYTES NFR BLD: 0.4 % (ref 0–0.5)
LYMPHOCYTES # BLD AUTO: 2.24 10*3/MM3 (ref 0.6–4.2)
LYMPHOCYTES NFR BLD AUTO: 41.3 % (ref 10–50)
MCH RBC QN AUTO: 26.7 PG (ref 26.5–34)
MCHC RBC AUTO-ENTMCNC: 32.5 G/DL (ref 31.4–36)
MCV RBC AUTO: 82.3 FL (ref 80–98)
MONOCYTES # BLD AUTO: 0.38 10*3/MM3 (ref 0–0.9)
MONOCYTES NFR BLD AUTO: 7 % (ref 0–12)
NEUTROPHILS # BLD AUTO: 2.65 10*3/MM3 (ref 2–8.6)
NEUTROPHILS NFR BLD AUTO: 48.7 % (ref 37–80)
NRBC BLD MANUAL-RTO: 0 /100 WBC (ref 0–0)
PLATELET # BLD AUTO: 359 10*3/MM3 (ref 150–450)
PMV BLD AUTO: 8.2 FL (ref 8–12)
POTASSIUM BLD-SCNC: 4.2 MMOL/L (ref 3.5–5.1)
PROT SERPL-MCNC: 7.3 G/DL (ref 6.3–8.6)
RBC # BLD AUTO: 3.33 10*6/MM3 (ref 3.77–5.16)
SODIUM BLD-SCNC: 139 MMOL/L (ref 137–145)
TSH SERPL DL<=0.05 MIU/L-ACNC: 11.1 MIU/ML (ref 0.46–4.68)
VIT B12 BLD-MCNC: 346 PG/ML (ref 239–931)
WBC NRBC COR # BLD: 5.43 10*3/MM3 (ref 3.2–9.8)

## 2018-08-07 PROCEDURE — 84443 ASSAY THYROID STIM HORMONE: CPT | Performed by: NURSE PRACTITIONER

## 2018-08-07 PROCEDURE — 85025 COMPLETE CBC W/AUTO DIFF WBC: CPT | Performed by: NURSE PRACTITIONER

## 2018-08-07 PROCEDURE — 80053 COMPREHEN METABOLIC PANEL: CPT | Performed by: NURSE PRACTITIONER

## 2018-08-07 PROCEDURE — 82607 VITAMIN B-12: CPT | Performed by: NURSE PRACTITIONER

## 2018-08-07 PROCEDURE — 99214 OFFICE O/P EST MOD 30 MIN: CPT | Performed by: NURSE PRACTITIONER

## 2018-08-07 PROCEDURE — 36415 COLL VENOUS BLD VENIPUNCTURE: CPT | Performed by: NURSE PRACTITIONER

## 2018-08-07 PROCEDURE — 82306 VITAMIN D 25 HYDROXY: CPT | Performed by: NURSE PRACTITIONER

## 2018-08-07 RX ORDER — LEVOTHYROXINE SODIUM 137 UG/1
137 CAPSULE ORAL DAILY
Qty: 30 CAPSULE | Refills: 5 | Status: SHIPPED | OUTPATIENT
Start: 2018-08-07 | End: 2019-01-28 | Stop reason: SDUPTHER

## 2018-08-07 RX ORDER — ERGOCALCIFEROL 1.25 MG/1
50000 CAPSULE ORAL WEEKLY
Qty: 4 CAPSULE | Refills: 5 | Status: SHIPPED | OUTPATIENT
Start: 2018-08-07 | End: 2019-01-28 | Stop reason: SDUPTHER

## 2018-08-07 NOTE — TELEPHONE ENCOUNTER
----- Message from REBECA Blancas sent at 8/7/2018 12:47 PM CDT -----  Thyroid is underactive at 11 -- she takes 125 mcg daily increase levothyroxine to 137 mcg daily; vitamin d low at 23.8 she needs 50,000 units weekly; blood count has dropped from 10 to 8.9 if she becomes symptomatic-- SOA, elevated heart rate, etc.. She needs to go to the ER-- keep appt with Friday this week --

## 2018-08-07 NOTE — PROGRESS NOTES
Subjective    Jerrica Marquez is a 28 y.o. female. she is here today for follow-up.    History of Present Illness       27 year old comes for follow up      reason - hypothyroidism,due to Hashimoto's     timing - constant     quality - not controlled     severity - moderate     symptoms - 60 + lb weight gain, fatigue, hair loss, constipation, arthralgias     alleviating - levothyroxine 100 mcgs daily and on this dose TSH is 5     Delivered baby  December 21, 2017      --     obesity   despite exercise  following 1800 calorie diet     --     h o vit D Deficiency, not on supplements                       Evaluation history:  TSH   Date Value Ref Range Status   04/09/2018 3.060 0.460 - 4.680 mIU/mL Final     Free T4   Date Value Ref Range Status   04/09/2018 1.06 0.78 - 2.19 ng/dL Final       Current medications:  Current Outpatient Prescriptions   Medication Sig Dispense Refill   • levothyroxine (SYNTHROID) 125 MCG tablet Take one tablet daily with one extra tablet on Monday and Thursday. 40 tablet 11   • medroxyPROGESTERone (PROVERA) 10 MG tablet Take 1 tablet by mouth Daily for 10 days. 10 tablet 0   • norgestimate-ethinyl estradiol (SPRINTEC 28) 0.25-35 MG-MCG per tablet Take 1 tablet by mouth Daily. 28 tablet 12   • norgestimate-ethinyl estradiol (TRI-SPRINTEC) 0.18/0.215/0.25 MG-35 MCG per tablet Take 1 tablet by mouth Daily. 28 tablet 12   • Prenatal Vit-Fe Fumarate-FA (PRENATAL VITAMIN PO) Take  by mouth Daily.       No current facility-administered medications for this visit.        The following portions of the patient's history were reviewed and updated as appropriate:   Past Medical History:   Diagnosis Date   • Abdominal pain     generalized cramping after eating wheat type products   • Acquired hypothyroidism     Secondary to Hashimoto's thyroiditis   • Dysmenorrhea    • Endogenous obesity    • Excessive or frequent menstruation    • Hashimoto's thyroiditis    • Headache    • Hypertension 11/01/2017     gestational hypertension   • Myopia    • Vitamin D deficiency      Past Surgical History:   Procedure Laterality Date   •  SECTION N/A 2017    Procedure:  SECTION PRIMARY;  Surgeon: Jayne Thibodeaux MD;  Location: [facility] LABOR DELIVERY;  Service:    • INJECTION OF MEDICATION  2013    Depo Provera (medroxyprogesterone acetate)   • INJECTION OF MEDICATION  2012    Kenalog x2   • INJECTION OF MEDICATION  2015    Toradol   • INJECTION OF MEDICATION  2015    Zofran   • WISDOM TOOTH EXTRACTION       Family History   Problem Relation Age of Onset   • Asthma Other    • Hypertension Other    • Other Other         Thyroid Disorder, Depressive Disorder, Smoking Tobacco, Anxiety   • Rheum arthritis Mother    • Hypertension Father    • Heart disease Maternal Grandfather    • Hypertension Maternal Grandfather      OB History      Para Term  AB Living    1 1 1     1    SAB TAB Ectopic Molar Multiple Live Births            0 1        Allergies   Allergen Reactions   • Cefzil [Cefprozil] Hives   • Celexa [Citalopram]      Patient states that she is not allergic to this med. It causes fatigue   • Influenza Vaccines      Migraines/ vomiting has reaction 2015- not in past      Social History     Social History   • Marital status:      Social History Main Topics   • Smoking status: Never Smoker   • Smokeless tobacco: Never Used   • Alcohol use No   • Drug use: No   • Sexual activity: Yes     Partners: Male     Other Topics Concern   • Not on file       Review of Systems  Review of Systems   Constitutional: Negative for activity change, appetite change, diaphoresis and fatigue.   HENT: Negative for facial swelling, sneezing, sore throat, tinnitus, trouble swallowing and voice change.    Eyes: Negative for photophobia, pain, discharge, redness, itching and visual disturbance.   Respiratory: Negative for apnea, cough, choking, chest tightness and shortness of breath.   "  Cardiovascular: Negative for chest pain, palpitations and leg swelling.   Gastrointestinal: Negative for abdominal distention, abdominal pain, constipation, diarrhea, nausea and vomiting.   Endocrine: Negative for cold intolerance, heat intolerance, polydipsia, polyphagia and polyuria.   Genitourinary: Negative for difficulty urinating, dysuria, frequency, hematuria and urgency.   Musculoskeletal: Negative for arthralgias, back pain, gait problem, joint swelling, myalgias, neck pain and neck stiffness.   Skin: Negative for color change, pallor, rash and wound.   Neurological: Negative for dizziness, tremors, weakness, light-headedness, numbness and headaches.   Hematological: Negative for adenopathy. Does not bruise/bleed easily.   Psychiatric/Behavioral: Negative for behavioral problems, confusion and sleep disturbance.        Objective    /86 (BP Location: Left arm, Patient Position: Sitting, Cuff Size: Adult)   Pulse 97   Ht 165.1 cm (65\")   Wt 114 kg (251 lb)   BMI 41.77 kg/m²   Physical Exam   Constitutional: She is oriented to person, place, and time. She appears well-developed and well-nourished. No distress.   HENT:   Head: Normocephalic and atraumatic.   Right Ear: External ear normal.   Left Ear: External ear normal.   Nose: Nose normal.   Eyes: Pupils are equal, round, and reactive to light. Conjunctivae and EOM are normal.   Neck: Normal range of motion. Neck supple. No tracheal deviation present. No thyromegaly present.   Cardiovascular: Normal rate, regular rhythm and normal heart sounds.    No murmur heard.  Pulmonary/Chest: Effort normal and breath sounds normal. No respiratory distress. She has no wheezes.   Abdominal: Soft. Bowel sounds are normal. There is no tenderness. There is no rebound and no guarding.   Musculoskeletal: Normal range of motion. She exhibits no edema, tenderness or deformity.   Neurological: She is alert and oriented to person, place, and time. No cranial nerve " deficit.   Skin: Skin is warm and dry. No rash noted.   Psychiatric: She has a normal mood and affect. Her behavior is normal. Judgment and thought content normal.       Lab Review  Lab Results   Component Value Date    TSH 3.060 04/09/2018     Lab Results   Component Value Date    FREET4 1.06 04/09/2018        Assessment/Plan      1. Hashimoto's thyroiditis    2. Vitamin D deficiency    3. Class 3 obesity with body mass index (BMI) of 40.0 to 44.9 in adult, unspecified obesity type, unspecified whether serious comorbidity present (CMS/[facility])    . This diagnosis was discussed and reviewed with the patient including the advantages of drug therapy.     1. Orders placed during this encounter include:  Orders Placed This Encounter   Procedures   • TSH   • Vitamin D 25 Hydroxy   • Vitamin B12   • Comprehensive Metabolic Panel   • CBC & Differential     Order Specific Question:   Manual Differential     Answer:   No       Medications prescribed:  Outpatient Encounter Prescriptions as of 8/7/2018   Medication Sig Dispense Refill   • levothyroxine (SYNTHROID) 125 MCG tablet Take one tablet daily with one extra tablet on Monday and Thursday. 40 tablet 11   • medroxyPROGESTERone (PROVERA) 10 MG tablet Take 1 tablet by mouth Daily for 10 days. 10 tablet 0   • norgestimate-ethinyl estradiol (SPRINTEC 28) 0.25-35 MG-MCG per tablet Take 1 tablet by mouth Daily. 28 tablet 12   • norgestimate-ethinyl estradiol (TRI-SPRINTEC) 0.18/0.215/0.25 MG-35 MCG per tablet Take 1 tablet by mouth Daily. 28 tablet 12   • Prenatal Vit-Fe Fumarate-FA (PRENATAL VITAMIN PO) Take  by mouth Daily.       No facility-administered encounter medications on file as of 8/7/2018.      Plan Details  Hypothyroidism due to hashimoto's           Take 125 mcg one daily     Lab Results   Component Value Date    TSH 3.060 04/09/2018                ====     Vit D Def , start 50 th q 2 w           Component      Latest Ref Rng & Units 4/9/2018   25 Hydroxy, Vitamin D       30.0 - 100.0 ng/ml 22.7 (L)            ====     B12 nl          --------------     stomach pain after eating breads and other foods     will check for celiac --negative        Weight Management      Patient's Body mass index is 41.77 kg/m². BMI is above normal parameters. Recommendations include: educational material.       Does not want any type of dietary medications      Will try to exercise      Hand out given on exercise            4. Return in about 6 months (around 2/7/2019) for Recheck.

## 2018-08-10 ENCOUNTER — OFFICE VISIT (OUTPATIENT)
Dept: OBSTETRICS AND GYNECOLOGY | Facility: CLINIC | Age: 28
End: 2018-08-10

## 2018-08-10 VITALS
BODY MASS INDEX: 42.15 KG/M2 | SYSTOLIC BLOOD PRESSURE: 152 MMHG | HEIGHT: 65 IN | WEIGHT: 253 LBS | DIASTOLIC BLOOD PRESSURE: 100 MMHG

## 2018-08-10 DIAGNOSIS — N93.9 ABNORMAL UTERINE BLEEDING (AUB): Primary | ICD-10-CM

## 2018-08-10 PROCEDURE — 99212 OFFICE O/P EST SF 10 MIN: CPT | Performed by: OBSTETRICS & GYNECOLOGY

## 2018-08-10 RX ORDER — FERROUS SULFATE 325(65) MG
325 TABLET ORAL
COMMUNITY
End: 2019-02-07

## 2018-08-10 RX ORDER — ACETAMINOPHEN AND CODEINE PHOSPHATE 120; 12 MG/5ML; MG/5ML
1 SOLUTION ORAL DAILY
Qty: 28 TABLET | Refills: 12 | Status: SHIPPED | OUTPATIENT
Start: 2018-08-10 | End: 2019-06-10

## 2018-08-10 NOTE — PROGRESS NOTES
"Subjective     Chief Complaint   Patient presents with   • ER FOLLOW UP       Jerrica Marquez is a 28 y.o.  presents today to discuss her periods.  When she was done with breastfeeding, she started OCPs.  The first pill worked well, however, the second pill (changed due to insurance) caused hives.  She stopped it.  She started her period at the end of July and the bleeding was heavy.  She was soaking pads, prompting her to come the ER.  Her H&H was 10/29.3 and she was told she had a \"thick uterus.\"  She was given provera and was discharged home.  Her bleeding stopped and no bleeding since.  Her H&H did drop down to 8.9/27.4.  But denies any symptoms of anemia, denying dizziness, palpitation, CP, or SOB.      No changes to PMH, PSH, family or social history since last visit.  No new medications or allergies.     Objective      /100   Ht 165.1 cm (65\")   Wt 115 kg (253 lb)   Breastfeeding? No   BMI 42.10 kg/m²     Physical Exam  General:   Appears stated age, AAOx3, NAD   Neurologic: CN II - XII grossly intact     TVUS ():  The uterus is enlarged.  The uterus measures 11.33 cm in length by 6.23 cm AP by 6.77 cm  transverse.  Endometrium thickened to 22.8 mm     Multiple less than 1 cm follicular cysts right ovary.  Unremarkable left ovary.  Right ovary 3.28 cm x 2.43 cm x 2.34 cm with a volume of 9.75 mL.  Left ovary 2.20 cm x 2.10 cm x 1.93 cm with a volume of 4.67 mL.  Very small amount of free fluid in the cul-de-sac.    Assessment/Plan     ASSESSMENT  1. Abnormal uterine bleeding (AUB)        PLAN  1. AUB   - No bleeding now  - Given improvement with progesterone and with BPs today, would recommended discontinuation of OCPs and trying progesterone only form of hormonal contraceptives; discussed IUD, Depo, Nexplanon or POP; patient desires POP    - Discussed compliance with POP  - Encouraged patient to follow up with endocrine given last TSH levels, could be adding to the etiology of her " irregular bleeding patterns  - RTC in 1 month for follow up          New Medications Ordered This Visit   Medications   • norethindrone (MICRONOR) 0.35 MG tablet     Sig: Take 1 tablet by mouth Daily.     Dispense:  28 tablet     Refill:  12       Jayne Thibodeaux MD  8/10/2018

## 2018-08-29 RX ORDER — LEVOTHYROXINE SODIUM 0.12 MG/1
TABLET ORAL
Qty: 40 TABLET | Refills: 11 | OUTPATIENT
Start: 2018-08-29

## 2018-09-10 ENCOUNTER — OFFICE VISIT (OUTPATIENT)
Dept: OBSTETRICS AND GYNECOLOGY | Facility: CLINIC | Age: 28
End: 2018-09-10

## 2018-09-10 VITALS
BODY MASS INDEX: 42.49 KG/M2 | HEIGHT: 65 IN | SYSTOLIC BLOOD PRESSURE: 144 MMHG | WEIGHT: 255 LBS | DIASTOLIC BLOOD PRESSURE: 83 MMHG

## 2018-09-10 DIAGNOSIS — N93.9 ABNORMAL UTERINE BLEEDING (AUB): Primary | ICD-10-CM

## 2018-09-10 PROCEDURE — 99212 OFFICE O/P EST SF 10 MIN: CPT | Performed by: OBSTETRICS & GYNECOLOGY

## 2018-09-10 NOTE — PROGRESS NOTES
"Subjective     Chief Complaint   Patient presents with   • Follow-up       Jerrica Marquez is a 28 y.o.  presents today for follow up for her periods.  Was seen in 2018 at which time she complained of HUB.  She was started on POP due to elevated BPs.  She states her period is much improved and is more like spotting.  Very happy with the pill.  Even feels like she has more energy since starting the pill.  No complaints or concerns.   Would like to continue POP.      No changes to PMH, PSH, family or social history since last visit.  No new medications or allergies.     Objective      /83   Ht 165.1 cm (65\")   Wt 116 kg (255 lb)   Breastfeeding? No   BMI 42.43 kg/m²     Physical Exam  General:   Appears stated age, AAOx3, NAD   Neurologic: CN II - XII grossly intact       Assessment/Plan     ASSESSMENT  1. Abnormal uterine bleeding (AUB)        PLAN  1. AUB  - Improved on POP, desires to continue  - Follow up with PCP for BPs  - RTC in 1 year for annual exam or earlier if any issues arise    Jayne Thibodeaux MD  9/10/2018           "

## 2019-01-28 RX ORDER — ERGOCALCIFEROL 1.25 MG/1
50000 CAPSULE ORAL WEEKLY
Qty: 4 CAPSULE | Refills: 5 | Status: SHIPPED | OUTPATIENT
Start: 2019-01-28 | End: 2019-06-10

## 2019-01-28 RX ORDER — LEVOTHYROXINE SODIUM 137 UG/1
TABLET ORAL
Qty: 30 TABLET | Refills: 5 | Status: SHIPPED | OUTPATIENT
Start: 2019-01-28 | End: 2019-05-08 | Stop reason: DRUGHIGH

## 2019-02-07 ENCOUNTER — APPOINTMENT (OUTPATIENT)
Dept: LAB | Facility: HOSPITAL | Age: 29
End: 2019-02-07

## 2019-02-07 ENCOUNTER — OFFICE VISIT (OUTPATIENT)
Dept: ENDOCRINOLOGY | Facility: CLINIC | Age: 29
End: 2019-02-07

## 2019-02-07 VITALS
DIASTOLIC BLOOD PRESSURE: 80 MMHG | HEIGHT: 65 IN | HEART RATE: 80 BPM | SYSTOLIC BLOOD PRESSURE: 140 MMHG | BODY MASS INDEX: 41.67 KG/M2 | WEIGHT: 250.1 LBS

## 2019-02-07 DIAGNOSIS — E55.9 VITAMIN D DEFICIENCY: ICD-10-CM

## 2019-02-07 DIAGNOSIS — F41.9 ANXIETY: Primary | ICD-10-CM

## 2019-02-07 DIAGNOSIS — E06.3 HASHIMOTO'S THYROIDITIS: Primary | ICD-10-CM

## 2019-02-07 DIAGNOSIS — E66.01 CLASS 3 SEVERE OBESITY WITH BODY MASS INDEX (BMI) OF 40.0 TO 44.9 IN ADULT, UNSPECIFIED OBESITY TYPE, UNSPECIFIED WHETHER SERIOUS COMORBIDITY PRESENT (HCC): ICD-10-CM

## 2019-02-07 LAB
25(OH)D3 SERPL-MCNC: 39 NG/ML (ref 30–100)
ALBUMIN SERPL-MCNC: 4.1 G/DL (ref 3.4–4.8)
ALBUMIN/GLOB SERPL: 1.4 G/DL (ref 1.1–1.8)
ALP SERPL-CCNC: 74 U/L (ref 38–126)
ALT SERPL W P-5'-P-CCNC: 22 U/L (ref 9–52)
ANION GAP SERPL CALCULATED.3IONS-SCNC: 8 MMOL/L (ref 5–15)
AST SERPL-CCNC: 29 U/L (ref 14–36)
BASOPHILS # BLD AUTO: 0.02 10*3/MM3 (ref 0–0.2)
BASOPHILS NFR BLD AUTO: 0.4 % (ref 0–2)
BILIRUB SERPL-MCNC: 0.3 MG/DL (ref 0.2–1.3)
BUN BLD-MCNC: 10 MG/DL (ref 7–21)
BUN/CREAT SERPL: 13.2 (ref 7–25)
CALCIUM SPEC-SCNC: 9.4 MG/DL (ref 8.4–10.2)
CHLORIDE SERPL-SCNC: 104 MMOL/L (ref 95–110)
CO2 SERPL-SCNC: 27 MMOL/L (ref 22–31)
CREAT BLD-MCNC: 0.76 MG/DL (ref 0.5–1)
DEPRECATED RDW RBC AUTO: 45.4 FL (ref 36.4–46.3)
EOSINOPHIL # BLD AUTO: 0.06 10*3/MM3 (ref 0–0.7)
EOSINOPHIL NFR BLD AUTO: 1.3 % (ref 0–7)
ERYTHROCYTE [DISTWIDTH] IN BLOOD BY AUTOMATED COUNT: 16.3 % (ref 11.5–14.5)
GFR SERPL CREATININE-BSD FRML MDRD: 91 ML/MIN/1.73 (ref 71–165)
GLOBULIN UR ELPH-MCNC: 3 GM/DL (ref 2.3–3.5)
GLUCOSE BLD-MCNC: 104 MG/DL (ref 60–100)
HCT VFR BLD AUTO: 35.1 % (ref 35–45)
HGB BLD-MCNC: 11.4 G/DL (ref 12–15.5)
IMM GRANULOCYTES # BLD AUTO: 0.01 10*3/MM3 (ref 0–0.02)
IMM GRANULOCYTES NFR BLD AUTO: 0.2 % (ref 0–0.5)
LYMPHOCYTES # BLD AUTO: 1.31 10*3/MM3 (ref 0.6–4.2)
LYMPHOCYTES NFR BLD AUTO: 27.6 % (ref 10–50)
MCH RBC QN AUTO: 24.5 PG (ref 26.5–34)
MCHC RBC AUTO-ENTMCNC: 32.5 G/DL (ref 31.4–36)
MCV RBC AUTO: 75.5 FL (ref 80–98)
MONOCYTES # BLD AUTO: 0.4 10*3/MM3 (ref 0–0.9)
MONOCYTES NFR BLD AUTO: 8.4 % (ref 0–12)
NEUTROPHILS # BLD AUTO: 2.94 10*3/MM3 (ref 2–8.6)
NEUTROPHILS NFR BLD AUTO: 62.1 % (ref 37–80)
PLATELET # BLD AUTO: 300 10*3/MM3 (ref 150–450)
PMV BLD AUTO: 9.5 FL (ref 8–12)
POTASSIUM BLD-SCNC: 3.8 MMOL/L (ref 3.5–5.1)
PROT SERPL-MCNC: 7.1 G/DL (ref 6.3–8.6)
RBC # BLD AUTO: 4.65 10*6/MM3 (ref 3.77–5.16)
SODIUM BLD-SCNC: 139 MMOL/L (ref 137–145)
TSH SERPL DL<=0.05 MIU/L-ACNC: 0.31 MIU/ML (ref 0.46–4.68)
VIT B12 BLD-MCNC: 549 PG/ML (ref 239–931)
WBC NRBC COR # BLD: 4.74 10*3/MM3 (ref 3.2–9.8)

## 2019-02-07 PROCEDURE — 36415 COLL VENOUS BLD VENIPUNCTURE: CPT | Performed by: NURSE PRACTITIONER

## 2019-02-07 PROCEDURE — 82306 VITAMIN D 25 HYDROXY: CPT | Performed by: NURSE PRACTITIONER

## 2019-02-07 PROCEDURE — 82607 VITAMIN B-12: CPT | Performed by: NURSE PRACTITIONER

## 2019-02-07 PROCEDURE — 84443 ASSAY THYROID STIM HORMONE: CPT | Performed by: NURSE PRACTITIONER

## 2019-02-07 PROCEDURE — 85025 COMPLETE CBC W/AUTO DIFF WBC: CPT | Performed by: NURSE PRACTITIONER

## 2019-02-07 PROCEDURE — 99214 OFFICE O/P EST MOD 30 MIN: CPT | Performed by: NURSE PRACTITIONER

## 2019-02-07 PROCEDURE — 80053 COMPREHEN METABOLIC PANEL: CPT | Performed by: NURSE PRACTITIONER

## 2019-02-07 NOTE — PROGRESS NOTES
Subjective    Jerrica Marquez is a 28 y.o. female. she is here today for follow-up.    History of Present Illness       27 year old comes for follow up      reason - hypothyroidism,due to Hashimoto's     timing - constant     quality - not controlled     severity - moderate     symptoms - 60 + lb weight gain, fatigue, hair loss, constipation, arthralgias     alleviating - levothyroxine 100 mcgs daily and on this dose TSH is 5         Lab Results   Component Value Date    TSH 11.100 (H) 2018          --     obesity   despite exercise  following 1800 calorie diet     --     h o vit D Deficiency, not on supplements                 Evaluation history:  TSH   Date Value Ref Range Status   2018 11.100 (H) 0.460 - 4.680 mIU/mL Final     Free T4   Date Value Ref Range Status   2018 1.06 0.78 - 2.19 ng/dL Final       Current medications:  Current Outpatient Medications   Medication Sig Dispense Refill   • levothyroxine (SYNTHROID, LEVOTHROID) 137 MCG tablet TAKE 1 TABLET BY MOUTH EVERY DAY 30 tablet 5   • norethindrone (MICRONOR) 0.35 MG tablet Take 1 tablet by mouth Daily. 28 tablet 12   • vitamin D (ERGOCALCIFEROL) 50354 units capsule capsule TAKE 1 CAPSULE BY MOUTH 1 (ONE) TIME PER WEEK. 4 capsule 5     No current facility-administered medications for this visit.        The following portions of the patient's history were reviewed and updated as appropriate:   Past Medical History:   Diagnosis Date   • Abdominal pain     generalized cramping after eating wheat type products   • Acquired hypothyroidism     Secondary to Hashimoto's thyroiditis   • Dysmenorrhea    • Endogenous obesity    • Excessive or frequent menstruation    • Hashimoto's thyroiditis    • Headache    • Hypertension 2017    gestational hypertension   • Myopia    • Vitamin D deficiency      Past Surgical History:   Procedure Laterality Date   •  SECTION N/A 2017    Procedure:  SECTION PRIMARY;  Surgeon:  Jayne Thibodeaux MD;  Location: [facility] LABOR DELIVERY;  Service:    • INJECTION OF MEDICATION  2013    Depo Provera (medroxyprogesterone acetate)   • INJECTION OF MEDICATION  2012    Kenalog x2   • INJECTION OF MEDICATION  2015    Toradol   • INJECTION OF MEDICATION  2015    Zofran   • WISDOM TOOTH EXTRACTION       Family History   Problem Relation Age of Onset   • Asthma Other    • Hypertension Other    • Other Other         Thyroid Disorder, Depressive Disorder, Smoking Tobacco, Anxiety   • Rheum arthritis Mother    • Hypertension Father    • Heart disease Maternal Grandfather    • Hypertension Maternal Grandfather      OB History      Para Term  AB Living    1 1 1     1    SAB TAB Ectopic Molar Multiple Live Births            0 1        Allergies   Allergen Reactions   • Cefzil [Cefprozil] Hives   • Celexa [Citalopram]      Patient states that she is not allergic to this med. It causes fatigue   • Influenza Vaccines      Migraines/ vomiting has reaction 2015- not in past      Social History     Socioeconomic History   • Marital status:      Spouse name: Not on file   • Number of children: Not on file   • Years of education: Not on file   • Highest education level: Not on file   Tobacco Use   • Smoking status: Never Smoker   • Smokeless tobacco: Never Used   Substance and Sexual Activity   • Alcohol use: No   • Drug use: No   • Sexual activity: Yes     Partners: Male       Review of Systems  Review of Systems   Constitutional: Positive for fatigue and unexpected weight change. Negative for activity change, appetite change and diaphoresis.   HENT: Negative for facial swelling, sneezing, sore throat, tinnitus, trouble swallowing and voice change.    Eyes: Negative for photophobia, pain, discharge, redness, itching and visual disturbance.   Respiratory: Negative for apnea, cough, choking, chest tightness and shortness of breath.    Cardiovascular: Negative for chest pain,  "palpitations and leg swelling.   Gastrointestinal: Negative for abdominal distention, abdominal pain, constipation, diarrhea, nausea and vomiting.   Endocrine: Negative for cold intolerance, heat intolerance, polydipsia, polyphagia and polyuria.   Genitourinary: Negative for difficulty urinating, dysuria, frequency, hematuria and urgency.   Musculoskeletal: Negative for arthralgias, back pain, gait problem, joint swelling, myalgias, neck pain and neck stiffness.   Skin: Negative for color change, pallor, rash and wound.   Neurological: Negative for dizziness, tremors, weakness, light-headedness, numbness and headaches.   Hematological: Negative for adenopathy. Does not bruise/bleed easily.   Psychiatric/Behavioral: Negative for behavioral problems, confusion and sleep disturbance.        Objective    /80 (BP Location: Left arm, Patient Position: Sitting)   Pulse 80   Ht 165.1 cm (65\")   Wt 113 kg (250 lb 1.6 oz)   BMI 41.62 kg/m²   Physical Exam   Constitutional: She is oriented to person, place, and time. She appears well-developed and well-nourished. No distress.   HENT:   Head: Normocephalic and atraumatic.   Right Ear: External ear normal.   Left Ear: External ear normal.   Nose: Nose normal.   Eyes: Conjunctivae and EOM are normal. Pupils are equal, round, and reactive to light.   Neck: Normal range of motion. Neck supple. No tracheal deviation present. No thyromegaly present.   Cardiovascular: Normal rate, regular rhythm and normal heart sounds.   No murmur heard.  Pulmonary/Chest: Effort normal and breath sounds normal. No respiratory distress. She has no wheezes.   Abdominal: Soft. Bowel sounds are normal. There is no tenderness. There is no rebound and no guarding.   Musculoskeletal: Normal range of motion. She exhibits no edema, tenderness or deformity.   Neurological: She is alert and oriented to person, place, and time. No cranial nerve deficit.   Skin: Skin is warm and dry. No rash noted. "   Psychiatric: She has a normal mood and affect. Her behavior is normal. Judgment and thought content normal.       Lab Review  Lab Results   Component Value Date    TSH 11.100 (H) 08/07/2018     Lab Results   Component Value Date    FREET4 1.06 04/09/2018        Assessment/Plan      1. Hashimoto's thyroiditis    2. Vitamin D deficiency    3. Class 3 severe obesity with body mass index (BMI) of 40.0 to 44.9 in adult, unspecified obesity type, unspecified whether serious comorbidity present (CMS/[facility])    . This diagnosis was discussed and reviewed with the patient including the advantages of drug therapy.     1. Orders placed during this encounter include:  Orders Placed This Encounter   Procedures   • Comprehensive Metabolic Panel   • TSH   • Vitamin B12   • Vitamin D 25 Hydroxy   • CBC & Differential     Order Specific Question:   Manual Differential     Answer:   No       Medications prescribed:  Outpatient Encounter Medications as of 2/7/2019   Medication Sig Dispense Refill   • levothyroxine (SYNTHROID, LEVOTHROID) 137 MCG tablet TAKE 1 TABLET BY MOUTH EVERY DAY 30 tablet 5   • norethindrone (MICRONOR) 0.35 MG tablet Take 1 tablet by mouth Daily. 28 tablet 12   • vitamin D (ERGOCALCIFEROL) 37081 units capsule capsule TAKE 1 CAPSULE BY MOUTH 1 (ONE) TIME PER WEEK. 4 capsule 5   • [DISCONTINUED] ferrous sulfate 325 (65 FE) MG tablet Take 325 mg by mouth Daily With Breakfast.       No facility-administered encounter medications on file as of 2/7/2019.      Plan Details  Hypothyroidism due to hashimoto's            Take 125 mcg one daily            Lab Results   Component Value Date     TSH 3.060 04/09/2018            Lab Results   Component Value Date    TSH 11.100 (H) 08/07/2018       taking 137 mcg daily      ====     Vit D Def , start 50 th q 2 w                   Component      Latest Ref Rng & Units 8/7/2018   25 Hydroxy, Vitamin D      30.0 - 100.0 ng/ml 23.8 (L)        ====     B12 nl         Lab Results    Component Value Date    [id number] 346 08/07/2018          --------------     stomach pain after eating breads and other foods     will check for celiac --negative        Weight Management      Patient's Body mass index is 41.62 kg/m². BMI is above normal parameters. Recommendations include: educational material.    Decrease caloric intake by 500 calories a day             4. Return in about 6 months (around 8/7/2019) for Recheck.

## 2019-05-08 ENCOUNTER — TELEPHONE (OUTPATIENT)
Dept: FAMILY MEDICINE CLINIC | Facility: CLINIC | Age: 29
End: 2019-05-08

## 2019-05-08 DIAGNOSIS — E06.3 HASHIMOTO'S THYROIDITIS: Primary | ICD-10-CM

## 2019-05-08 RX ORDER — LEVOTHYROXINE SODIUM 0.15 MG/1
150 TABLET ORAL DAILY
Qty: 30 TABLET | Refills: 11 | Status: SHIPPED | OUTPATIENT
Start: 2019-05-08 | End: 2019-07-08

## 2019-05-08 NOTE — TELEPHONE ENCOUNTER
Increase to 150 mcg daily     No to the vitamin d while pregnant she will get that in her prenatal vitamins; we need a TSH I will put in an order

## 2019-05-08 NOTE — TELEPHONE ENCOUNTER
Pt has a positive pregnancy test and says last time Beto changed the way she takes her thyroid medicine. She is wanting to know if it needs changed again, please call. Thanks

## 2019-05-08 NOTE — TELEPHONE ENCOUNTER
She takes 137 mcg every day but wed.     She is 5 weeks and wants to know if it is ok to take her vitamin d weekly

## 2019-05-08 NOTE — TELEPHONE ENCOUNTER
Pt has a positive pregnancy test and says last time Beto changed the way she takes her thyroid medicine. She is wanting to know if it needs changed again, please call. Thanks!

## 2019-05-09 ENCOUNTER — APPOINTMENT (OUTPATIENT)
Dept: LAB | Facility: HOSPITAL | Age: 29
End: 2019-05-09

## 2019-05-09 LAB — TSH SERPL DL<=0.05 MIU/L-ACNC: 2.43 MIU/ML (ref 0.27–4.2)

## 2019-05-09 PROCEDURE — 36415 COLL VENOUS BLD VENIPUNCTURE: CPT | Performed by: NURSE PRACTITIONER

## 2019-05-09 PROCEDURE — 84443 ASSAY THYROID STIM HORMONE: CPT | Performed by: NURSE PRACTITIONER

## 2019-05-10 ENCOUNTER — TELEPHONE (OUTPATIENT)
Dept: ENDOCRINOLOGY | Facility: CLINIC | Age: 29
End: 2019-05-10

## 2019-05-10 DIAGNOSIS — E06.3 HASHIMOTO'S THYROIDITIS: Primary | ICD-10-CM

## 2019-05-10 NOTE — TELEPHONE ENCOUNTER
----- Message from REBECA Blancas sent at 5/10/2019  9:14 AM CDT -----  Let her know the TSH is 2.43 so it is under the 2.5 that we want for pregnancy but increase the dosage like we discussed yesterday; she needs to do monthly labs I will put in a standing order

## 2019-06-10 ENCOUNTER — APPOINTMENT (OUTPATIENT)
Dept: LAB | Facility: HOSPITAL | Age: 29
End: 2019-06-10

## 2019-06-10 ENCOUNTER — INITIAL PRENATAL (OUTPATIENT)
Dept: OBSTETRICS AND GYNECOLOGY | Facility: CLINIC | Age: 29
End: 2019-06-10

## 2019-06-10 VITALS — WEIGHT: 258.6 LBS | DIASTOLIC BLOOD PRESSURE: 84 MMHG | SYSTOLIC BLOOD PRESSURE: 138 MMHG | BODY MASS INDEX: 43.03 KG/M2

## 2019-06-10 DIAGNOSIS — O34.219 HISTORY OF CESAREAN DELIVERY, CURRENTLY PREGNANT: ICD-10-CM

## 2019-06-10 DIAGNOSIS — Z32.01 POSITIVE PREGNANCY TEST: Primary | ICD-10-CM

## 2019-06-10 DIAGNOSIS — O10.919 CHRONIC HYPERTENSION IN PREGNANCY: ICD-10-CM

## 2019-06-10 DIAGNOSIS — Z78.9 DATE OF LAST MENSTRUAL PERIOD (LMP) UNKNOWN: ICD-10-CM

## 2019-06-10 DIAGNOSIS — O99.211 MATERNAL OBESITY SYNDROME IN FIRST TRIMESTER, ANTEPARTUM: ICD-10-CM

## 2019-06-10 DIAGNOSIS — E03.8 HYPOTHYROIDISM DUE TO HASHIMOTO'S THYROIDITIS: ICD-10-CM

## 2019-06-10 DIAGNOSIS — O21.9 NAUSEA AND VOMITING DURING PREGNANCY PRIOR TO 22 WEEKS GESTATION: ICD-10-CM

## 2019-06-10 DIAGNOSIS — E06.3 HYPOTHYROIDISM DUE TO HASHIMOTO'S THYROIDITIS: ICD-10-CM

## 2019-06-10 DIAGNOSIS — Z3A.09 9 WEEKS GESTATION OF PREGNANCY: Primary | ICD-10-CM

## 2019-06-10 DIAGNOSIS — O09.291 HISTORY OF PRE-ECLAMPSIA IN PRIOR PREGNANCY, CURRENTLY PREGNANT IN FIRST TRIMESTER: ICD-10-CM

## 2019-06-10 PROCEDURE — 0501F PRENATAL FLOW SHEET: CPT | Performed by: NURSE PRACTITIONER

## 2019-06-10 RX ORDER — LABETALOL 100 MG/1
100 TABLET, FILM COATED ORAL 2 TIMES DAILY
Refills: 2 | COMMUNITY
Start: 2019-05-20 | End: 2019-08-27 | Stop reason: SDUPTHER

## 2019-06-10 RX ORDER — PNV NO.95/FERROUS FUM/FOLIC AC 28MG-0.8MG
1 TABLET ORAL DAILY
COMMUNITY
End: 2020-10-04

## 2019-06-11 PROBLEM — O14.00 MILD PREECLAMPSIA: Status: RESOLVED | Noted: 2017-12-19 | Resolved: 2019-06-11

## 2019-06-11 NOTE — PROGRESS NOTES
"CC: NOB visit, hx reviewed    Pt was initially seen at [facility] in [place], [place] but would like to transfer care here.      HPI:  Second pregnancy.  Chronic hypertension, taking labetalol 100mg daily, prescribed to take it twice daily, but \"it drops my blood pressure down to 100/70 and makes me nauseated so I only take it once daily\".  Hypothyroid disease-hashimoto type seeing REBECA Salvador regularly.      ROS: +nausea and vomiting, constipation.  Pt denies cramping, dysuria, or vaginal bleeding.      P/E:  See Vitals flow sheet    A/P: 28 y.o. #: 1, Date: 17, Sex: Female, Weight: 3515 g (7 lb 12 oz), GA: 37w2d, Delivery: , Low Transverse, Apgar1: 7, Apgar5: 9, Living: Living, Birth Comments: Preeclampsia    #: 2, Date: None, Sex: None, Weight: None, GA: 9w5d per stated LMP which c/w with pt early dating scan (awaiting scan report to confirm)    1. Routine prenatal care   Encourage PNV   SAB precautions   Pt to sign release of information for ultrasound report   -need GCT lab  2.    Diagnosis Plan   1. 9 weeks gestation of pregnancy     2. Chronic hypertension in pregnancy  Continue labetalol as prescribed   3. History of pre-eclampsia in prior pregnancy, currently pregnant in first trimester  Baseline labs drawn   4. Nausea and vomiting during pregnancy prior to 22 weeks gestation  Kinsey candy has been helping   5. Maternal obesity syndrome in first trimester, antepartum  Increased surveillance as indicated   6. History of  delivery, currently pregnant  Plans for RCS   7. Hypothyroidism due to Hashimoto's thyroiditis       RTC in 1 month for  appt or sooner if needed  "

## 2019-06-18 ENCOUNTER — LAB (OUTPATIENT)
Dept: LAB | Facility: HOSPITAL | Age: 29
End: 2019-06-18

## 2019-06-18 DIAGNOSIS — E06.3 HASHIMOTO'S THYROIDITIS: ICD-10-CM

## 2019-06-18 LAB — TSH SERPL DL<=0.05 MIU/L-ACNC: 1.48 MIU/ML (ref 0.27–4.2)

## 2019-06-18 PROCEDURE — 36415 COLL VENOUS BLD VENIPUNCTURE: CPT

## 2019-06-18 PROCEDURE — 84443 ASSAY THYROID STIM HORMONE: CPT

## 2019-06-18 NOTE — PROGRESS NOTES
Pt was here for her new OB appt on 6/10/19  This pt is a transfer patient from [place], [place].  I ordered all new OB labs, and her dating U/S.  Went over the pt's medical hx, personal hx, social hx, ob hx.  The first trimester reaching was done by the provider, Annalisa Pradhan.

## 2019-06-25 RX ORDER — LEVOTHYROXINE SODIUM 175 UG/1
TABLET ORAL
Qty: 30 TABLET | Refills: 0 | Status: SHIPPED | OUTPATIENT
Start: 2019-06-25 | End: 2019-07-30 | Stop reason: SDUPTHER

## 2019-06-27 ENCOUNTER — RESULTS ENCOUNTER (OUTPATIENT)
Dept: OBSTETRICS AND GYNECOLOGY | Facility: CLINIC | Age: 29
End: 2019-06-27

## 2019-06-27 DIAGNOSIS — Z13.79 GENETIC SCREENING: ICD-10-CM

## 2019-06-27 DIAGNOSIS — Z13.79 GENETIC SCREENING: Primary | ICD-10-CM

## 2019-07-08 ENCOUNTER — ROUTINE PRENATAL (OUTPATIENT)
Dept: OBSTETRICS AND GYNECOLOGY | Facility: CLINIC | Age: 29
End: 2019-07-08

## 2019-07-08 VITALS — BODY MASS INDEX: 43.1 KG/M2 | WEIGHT: 259 LBS | SYSTOLIC BLOOD PRESSURE: 139 MMHG | DIASTOLIC BLOOD PRESSURE: 78 MMHG

## 2019-07-08 DIAGNOSIS — O21.9 NAUSEA AND VOMITING DURING PREGNANCY PRIOR TO 22 WEEKS GESTATION: ICD-10-CM

## 2019-07-08 DIAGNOSIS — O34.219 HISTORY OF CESAREAN DELIVERY, CURRENTLY PREGNANT: ICD-10-CM

## 2019-07-08 DIAGNOSIS — E03.9 HYPOTHYROID IN PREGNANCY, ANTEPARTUM, FIRST TRIMESTER: ICD-10-CM

## 2019-07-08 DIAGNOSIS — Z3A.13 13 WEEKS GESTATION OF PREGNANCY: Primary | ICD-10-CM

## 2019-07-08 DIAGNOSIS — O10.911 CHRONIC HYPERTENSION IN OBSTETRIC CONTEXT IN FIRST TRIMESTER: ICD-10-CM

## 2019-07-08 DIAGNOSIS — O09.291 HISTORY OF PRE-ECLAMPSIA IN PRIOR PREGNANCY, CURRENTLY PREGNANT IN FIRST TRIMESTER: ICD-10-CM

## 2019-07-08 DIAGNOSIS — Z36.9 ENCOUNTER FOR ANTENATAL SCREENING: ICD-10-CM

## 2019-07-08 DIAGNOSIS — O99.281 HYPOTHYROID IN PREGNANCY, ANTEPARTUM, FIRST TRIMESTER: ICD-10-CM

## 2019-07-08 DIAGNOSIS — O99.211 MATERNAL OBESITY, ANTEPARTUM, FIRST TRIMESTER: ICD-10-CM

## 2019-07-08 PROCEDURE — 0502F SUBSEQUENT PRENATAL CARE: CPT | Performed by: NURSE PRACTITIONER

## 2019-07-22 RX ORDER — ERGOCALCIFEROL 1.25 MG/1
50000 CAPSULE ORAL WEEKLY
Qty: 4 CAPSULE | Refills: 5 | Status: SHIPPED | OUTPATIENT
Start: 2019-07-22 | End: 2019-08-08 | Stop reason: ALTCHOICE

## 2019-07-30 RX ORDER — LEVOTHYROXINE SODIUM 175 UG/1
TABLET ORAL
Qty: 30 TABLET | Refills: 0 | Status: SHIPPED | OUTPATIENT
Start: 2019-07-30 | End: 2019-08-08 | Stop reason: SDUPTHER

## 2019-08-06 ENCOUNTER — ROUTINE PRENATAL (OUTPATIENT)
Dept: OBSTETRICS AND GYNECOLOGY | Facility: CLINIC | Age: 29
End: 2019-08-06

## 2019-08-06 ENCOUNTER — LAB (OUTPATIENT)
Dept: LAB | Facility: HOSPITAL | Age: 29
End: 2019-08-06

## 2019-08-06 VITALS — SYSTOLIC BLOOD PRESSURE: 128 MMHG | BODY MASS INDEX: 42.5 KG/M2 | DIASTOLIC BLOOD PRESSURE: 82 MMHG | WEIGHT: 255.4 LBS

## 2019-08-06 DIAGNOSIS — E66.01 CLASS 3 SEVERE OBESITY WITH BODY MASS INDEX (BMI) OF 40.0 TO 44.9 IN ADULT, UNSPECIFIED OBESITY TYPE, UNSPECIFIED WHETHER SERIOUS COMORBIDITY PRESENT (HCC): ICD-10-CM

## 2019-08-06 DIAGNOSIS — O10.919 CHRONIC HYPERTENSION AFFECTING PREGNANCY: ICD-10-CM

## 2019-08-06 DIAGNOSIS — E06.3 HASHIMOTO'S THYROIDITIS: ICD-10-CM

## 2019-08-06 DIAGNOSIS — Z3A.17 17 WEEKS GESTATION OF PREGNANCY: Primary | ICD-10-CM

## 2019-08-06 LAB — TSH SERPL DL<=0.05 MIU/L-ACNC: 0.98 MIU/ML (ref 0.27–4.2)

## 2019-08-06 PROCEDURE — 36415 COLL VENOUS BLD VENIPUNCTURE: CPT

## 2019-08-06 PROCEDURE — 87491 CHLMYD TRACH DNA AMP PROBE: CPT | Performed by: NURSE PRACTITIONER

## 2019-08-06 PROCEDURE — 87661 TRICHOMONAS VAGINALIS AMPLIF: CPT | Performed by: NURSE PRACTITIONER

## 2019-08-06 PROCEDURE — 87591 N.GONORRHOEAE DNA AMP PROB: CPT | Performed by: NURSE PRACTITIONER

## 2019-08-06 PROCEDURE — 0502F SUBSEQUENT PRENATAL CARE: CPT | Performed by: OBSTETRICS & GYNECOLOGY

## 2019-08-06 PROCEDURE — 84443 ASSAY THYROID STIM HORMONE: CPT

## 2019-08-06 NOTE — PROGRESS NOTES
CC:  visit    Patient Active Problem List   Diagnosis   • Hashimoto's thyroiditis   • Class 3 severe obesity with body mass index (BMI) of 40.0 to 44.9 in adult (CMS/[facility])   • Chronic hypertension affecting pregnancy       HPI: 29 y.o.   GA: 17w5d TERESITA: 2020, by Last Menstrual Period  Here for follow up prenatal care.  Located by chronic hypertension currently on labetalol 100 mg twice daily.  Patient also had superimposed preeclampsia with last pregnancy.  Patient is currently taking baby aspirin to try to prevent this in the future.  She states she has felt some fetal movement but inconsistent at this time.  Patient also has pregnancy complicated by Hashimoto's thyroiditis is currently on levothyroxine 175 mcg, sees endocrine tomorrow.  Patient also has history of primary low transverse  section.  Desires repeat low transverse  section with this pregnancy.    Labs:   No results found for: HGBA1C  Glucose   Date Value Ref Range Status   2019 104 (H) 60 - 100 mg/dL Final   2018 83 60 - 100 mg/dL Final   2018 94 60 - 100 mg/dL Final     Last Completed Pap Smear       Status Date      PAP SMEAR Done 2019 SCANNED - PAP SMEAR     Patient has more history with this topic...          Radiology:    Each of the above were reviewed and integrated into prenatal care plan.    P/E:  See Vitals flowsheet    A/P: Doing well, with appropriately progressing pregnancy at this time.  Pregnancy complicated by chronic hypertension, hypothyroidism, and history of previous .  Plan for patient to get anatomy ultrasound in the next 1 to 2 weeks, continue baby aspirin, continue labetalol 100 mg twice daily.  Patient to continue Synthroid for hypothyroidism and continue to see endocrine.  Patient to return to see me in 3 weeks bleeding and cramping precautions given patient to return sooner as needed.  Patient is planning repeat  section.     Diagnosis Plan   1. 17 weeks  gestation of pregnancy     2. Hashimoto's thyroiditis     3. Class 3 severe obesity with body mass index (BMI) of 40.0 to 44.9 in adult, unspecified obesity type, unspecified whether serious comorbidity present (CMS/HCC)     4. Chronic hypertension affecting pregnancy

## 2019-08-07 LAB
C TRACH RRNA CVX QL NAA+PROBE: NEGATIVE
N GONORRHOEA RRNA SPEC QL NAA+PROBE: NEGATIVE
TRICHOMONAS VAGINALIS PCR: NEGATIVE

## 2019-08-08 ENCOUNTER — OFFICE VISIT (OUTPATIENT)
Dept: ENDOCRINOLOGY | Facility: CLINIC | Age: 29
End: 2019-08-08

## 2019-08-08 VITALS
BODY MASS INDEX: 42.65 KG/M2 | DIASTOLIC BLOOD PRESSURE: 82 MMHG | HEIGHT: 65 IN | HEART RATE: 88 BPM | WEIGHT: 256 LBS | SYSTOLIC BLOOD PRESSURE: 136 MMHG

## 2019-08-08 DIAGNOSIS — E06.3 HASHIMOTO'S THYROIDITIS: Primary | ICD-10-CM

## 2019-08-08 DIAGNOSIS — E55.9 VITAMIN D DEFICIENCY: ICD-10-CM

## 2019-08-08 DIAGNOSIS — Z3A.18 18 WEEKS GESTATION OF PREGNANCY: ICD-10-CM

## 2019-08-08 PROCEDURE — 99214 OFFICE O/P EST MOD 30 MIN: CPT | Performed by: NURSE PRACTITIONER

## 2019-08-08 RX ORDER — LEVOTHYROXINE SODIUM 175 UG/1
175 TABLET ORAL DAILY
Qty: 30 TABLET | Refills: 11 | Status: SHIPPED | OUTPATIENT
Start: 2019-08-08 | End: 2020-09-09

## 2019-08-08 NOTE — PROGRESS NOTES
Subjective    Jerrica Marquez is a 29 y.o. female. she is here today for follow-up.    History of Present Illness     29 year old comes for follow up      reason - hypothyroidism,due to Hashimoto's      Currently 18 weeks pregnant      timing - constant     quality - not controlled     severity - moderate     symptoms - 60 + lb weight gain, fatigue, hair loss, constipation, arthralgias     alleviating - levothyroxine 175             Lab Results   Component Value Date    TSH 0.983 2019          --     obesity   despite exercise  following 1800 calorie diet     --     h o vit D Deficiency, not on supplements                    Evaluation history:  TSH   Date Value Ref Range Status   2019 0.983 0.270 - 4.200 mIU/mL Final     Free T4   Date Value Ref Range Status   2018 1.06 0.78 - 2.19 ng/dL Final       Current medications:  Current Outpatient Medications   Medication Sig Dispense Refill   • labetalol (NORMODYNE) 100 MG tablet Take 100 mg by mouth 2 (Two) Times a Day.  2   • levothyroxine (SYNTHROID, LEVOTHROID) 175 MCG tablet Take 1 tablet by mouth Daily. 30 tablet 11   • Prenatal Vit-Fe Fumarate-FA (PRENATAL VITAMIN) 27-0.8 MG tablet Take 1 tablet by mouth Daily.       No current facility-administered medications for this visit.        The following portions of the patient's history were reviewed and updated as appropriate:   Past Medical History:   Diagnosis Date   • Abdominal pain     generalized cramping after eating wheat type products   • Acquired hypothyroidism     Secondary to Hashimoto's thyroiditis   • Dysmenorrhea    • Endogenous obesity    • Excessive or frequent menstruation    • Hashimoto's thyroiditis    • Headache    • Hypertension 2017    gestational hypertension   • Myopia    • Vitamin D deficiency      Past Surgical History:   Procedure Laterality Date   •  SECTION N/A 2017    Procedure:  SECTION PRIMARY;  Surgeon: Jayne Thibodeaux MD;  Location:   [facility] LABOR DELIVERY;  Service:    • INJECTION OF MEDICATION  2013    Depo Provera (medroxyprogesterone acetate)   • INJECTION OF MEDICATION  2012    Kenalog x2   • INJECTION OF MEDICATION  2015    Toradol   • INJECTION OF MEDICATION  2015    Zofran   • WISDOM TOOTH EXTRACTION       Family History   Problem Relation Age of Onset   • Asthma Other    • Hypertension Other    • Other Other         Thyroid Disorder, Depressive Disorder, Smoking Tobacco, Anxiety   • Rheum arthritis Mother    • Hypertension Father    • Heart disease Maternal Grandfather    • Hypertension Maternal Grandfather    • Osteoporosis Paternal Grandmother    • Stroke Paternal Grandmother    • Pulmonary embolism Maternal Grandmother      OB History      Para Term  AB Living    2 1 1     1    SAB TAB Ectopic Molar Multiple Live Births            0 1        Allergies   Allergen Reactions   • Cefzil [Cefprozil] Hives   • Celexa [Citalopram]      Patient states that she is not allergic to this med. It causes fatigue   • Eggs Or Egg-Derived Products Diarrhea   • Influenza Vaccines      Migraines/ vomiting has reaction 2015- not in past      Social History     Socioeconomic History   • Marital status:      Spouse name: Not on file   • Number of children: Not on file   • Years of education: Not on file   • Highest education level: Not on file   Tobacco Use   • Smoking status: Never Smoker   • Smokeless tobacco: Never Used   Substance and Sexual Activity   • Alcohol use: No   • Drug use: No   • Sexual activity: Yes     Partners: Male       Review of Systems  Review of Systems   Constitutional: Negative for activity change, appetite change, diaphoresis and fatigue.   HENT: Negative for facial swelling, sneezing, sore throat, tinnitus, trouble swallowing and voice change.    Eyes: Negative for photophobia, pain, discharge, redness, itching and visual disturbance.   Respiratory: Negative for apnea, cough, choking,  "chest tightness and shortness of breath.    Cardiovascular: Negative for chest pain, palpitations and leg swelling.   Gastrointestinal: Negative for abdominal distention, abdominal pain, constipation, diarrhea, nausea and vomiting.   Endocrine: Negative for cold intolerance, heat intolerance, polydipsia, polyphagia and polyuria.   Genitourinary: Negative for difficulty urinating, dysuria, frequency, hematuria and urgency.   Musculoskeletal: Negative for arthralgias, back pain, gait problem, joint swelling, myalgias, neck pain and neck stiffness.   Skin: Negative for color change, pallor, rash and wound.   Neurological: Negative for dizziness, tremors, weakness, light-headedness, numbness and headaches.   Hematological: Negative for adenopathy. Does not bruise/bleed easily.   Psychiatric/Behavioral: Negative for behavioral problems, confusion and sleep disturbance.        Objective    /82 (BP Location: Right arm, Patient Position: Sitting, Cuff Size: Adult)   Pulse 88   Ht 165.1 cm (65\")   Wt 116 kg (256 lb)   LMP 04/04/2019 (Approximate)   BMI 42.60 kg/m²   Physical Exam   Constitutional: She is oriented to person, place, and time. She appears well-developed and well-nourished. No distress.   HENT:   Head: Normocephalic and atraumatic.   Right Ear: External ear normal.   Left Ear: External ear normal.   Nose: Nose normal.   Eyes: Conjunctivae and EOM are normal. Pupils are equal, round, and reactive to light.   Neck: Normal range of motion. Neck supple. No tracheal deviation present. No thyromegaly present.   Cardiovascular: Normal rate, regular rhythm and normal heart sounds.   No murmur heard.  Pulmonary/Chest: Effort normal and breath sounds normal. No respiratory distress. She has no wheezes.   Abdominal: Soft. Bowel sounds are normal. There is no tenderness. There is no rebound and no guarding.   Musculoskeletal: Normal range of motion. She exhibits no edema, tenderness or deformity.   Neurological: " She is alert and oriented to person, place, and time. No cranial nerve deficit.   Skin: Skin is warm and dry. No rash noted.   Psychiatric: She has a normal mood and affect. Her behavior is normal. Judgment and thought content normal.       Lab Review  Lab Results   Component Value Date    TSH 0.983 08/06/2019     Lab Results   Component Value Date    FREET4 1.06 04/09/2018        Assessment/Plan      1. Hashimoto's thyroiditis    2. 18 weeks gestation of pregnancy    3. Vitamin D deficiency    . This diagnosis was discussed and reviewed with the patient including the advantages of drug therapy.     1. Orders placed during this encounter include:  Orders Placed This Encounter   Procedures   • TSH     Standing Status:   Standing     Number of Occurrences:   24     Standing Expiration Date:   8/8/2020       Medications prescribed:  Outpatient Encounter Medications as of 8/8/2019   Medication Sig Dispense Refill   • labetalol (NORMODYNE) 100 MG tablet Take 100 mg by mouth 2 (Two) Times a Day.  2   • levothyroxine (SYNTHROID, LEVOTHROID) 175 MCG tablet Take 1 tablet by mouth Daily. 30 tablet 11   • Prenatal Vit-Fe Fumarate-FA (PRENATAL VITAMIN) 27-0.8 MG tablet Take 1 tablet by mouth Daily.     • [DISCONTINUED] levothyroxine (SYNTHROID, LEVOTHROID) 175 MCG tablet TAKE 1 TABLET BY MOUTH EVERY DAY 30 tablet 0   • [DISCONTINUED] vitamin D (ERGOCALCIFEROL) 57544 units capsule capsule TAKE 1 CAPSULE BY MOUTH 1 (ONE) TIME PER WEEK. 4 capsule 5     No facility-administered encounter medications on file as of 8/8/2019.      Plan Details  Hypothyroidism due to hashimoto's    Currently pregnant 18 weeks     Target for TSH 2.50 or less            Taking 175 mcg daily       Lab Results   Component Value Date    TSH 0.983 08/06/2019        labs monthly   ====     Vit D Def , start 50 th q 2 w                    Component      Latest Ref Rng & Units 8/7/2018   25 Hydroxy, Vitamin D      30.0 - 100.0 ng/ml 23.8 (L)       stopped  vitamin d     Taking prenatal         ====     B12 nl               Lab Results   Component Value Date     [id number] 346 08/07/2018            --------------     stomach pain after eating breads and other foods     will check for celiac --negative        Weight Management          Patient's Body mass index is 42.6 kg/m². BMI is above normal parameters. Recommendations include: educational material.      4. Return in about 4 months (around 12/8/2019).

## 2019-08-27 ENCOUNTER — ROUTINE PRENATAL (OUTPATIENT)
Dept: OBSTETRICS AND GYNECOLOGY | Facility: CLINIC | Age: 29
End: 2019-08-27

## 2019-08-27 VITALS — DIASTOLIC BLOOD PRESSURE: 68 MMHG | SYSTOLIC BLOOD PRESSURE: 118 MMHG | BODY MASS INDEX: 43.6 KG/M2 | WEIGHT: 262 LBS

## 2019-08-27 DIAGNOSIS — O99.891 BACK PAIN AFFECTING PREGNANCY IN SECOND TRIMESTER: ICD-10-CM

## 2019-08-27 DIAGNOSIS — O10.919 CHRONIC HYPERTENSION AFFECTING PREGNANCY: ICD-10-CM

## 2019-08-27 DIAGNOSIS — Z03.79 SUSPECTED MATERNAL CONDITION NOT FOUND: ICD-10-CM

## 2019-08-27 DIAGNOSIS — Z3A.20 20 WEEKS GESTATION OF PREGNANCY: Primary | ICD-10-CM

## 2019-08-27 DIAGNOSIS — E66.01 CLASS 3 SEVERE OBESITY WITH BODY MASS INDEX (BMI) OF 40.0 TO 44.9 IN ADULT, UNSPECIFIED OBESITY TYPE, UNSPECIFIED WHETHER SERIOUS COMORBIDITY PRESENT (HCC): ICD-10-CM

## 2019-08-27 DIAGNOSIS — M54.9 BACK PAIN AFFECTING PREGNANCY IN SECOND TRIMESTER: ICD-10-CM

## 2019-08-27 PROBLEM — Z3A.18 18 WEEKS GESTATION OF PREGNANCY: Status: RESOLVED | Noted: 2019-08-08 | Resolved: 2019-08-27

## 2019-08-27 PROCEDURE — 0502F SUBSEQUENT PRENATAL CARE: CPT | Performed by: OBSTETRICS & GYNECOLOGY

## 2019-08-27 RX ORDER — LABETALOL 100 MG/1
100 TABLET, FILM COATED ORAL 2 TIMES DAILY
Qty: 60 TABLET | Refills: 6 | Status: SHIPPED | OUTPATIENT
Start: 2019-08-27 | End: 2020-02-11

## 2019-08-27 RX ORDER — ASPIRIN 81 MG/1
81 TABLET, CHEWABLE ORAL DAILY
COMMUNITY
End: 2019-12-21 | Stop reason: HOSPADM

## 2019-08-27 NOTE — PROGRESS NOTES
CC:  visit    Patient Active Problem List   Diagnosis   • Hashimoto's thyroiditis   • Vitamin D deficiency   • Class 3 severe obesity with body mass index (BMI) of 40.0 to 44.9 in adult (CMS/[facility])   • Chronic hypertension affecting pregnancy       HPI: 29 y.o.   GA: 20w5d TERESITA: 2020, by Last Menstrual Period  Here for follow up prenatal care.  Positive fetal movement, denies bleeding or cramping at this time.  Was seen by endocrine and left on Synthroid 175 mcg daily, will follow up with them 1 week prior to pregnancy, and then will continue follow-up postpartum.  Patient continues to take labetalol 100 mg twice daily for chronic hypertension, blood pressure is normal today.  Patient does need refill on medication.  Patient also complaining of some right-sided back pain that she attributes to sciatica.  States that sometimes she is having trouble getting around and hard to get out of chairs or out of bed secondary to the pain.  Recommended the patient see physical therapy to see what recommendations they have.  Reviewed anatomy scan today with overall normal preliminary report, however suboptimal views will likely need repeat.    Labs:   No results found for: HGBA1C  Glucose   Date Value Ref Range Status   2019 104 (H) 60 - 100 mg/dL Final   2018 83 60 - 100 mg/dL Final   2018 94 60 - 100 mg/dL Final     Last Completed Pap Smear       Status Date      PAP SMEAR Done 2019 SCANNED - PAP SMEAR     Patient has more history with this topic...          Radiology:    Each of the above were reviewed and integrated into prenatal care plan.    P/E:  See Vitals flowsheet    A/P: Doing well with appropriately progressing pregnancy.  Size confirmed today at anatomy scan.  Due date of 2020.  Pregnancy complicated by chronic hypertension, with history of preeclampsia patient currently on labetalol 100 mg twice daily and baby aspirin, will refill labetalol today patient to continue both  medications.  Pregnancy also complicated by Hashimoto's thyroiditis currently on 175 mcg of levothyroxine, this is being managed by endocrine.  Plan to place consult for physical therapy.  Repeat ultrasound in 3 weeks secondary to suboptimal views.  Patient to return to see me in 3 weeks.  Bleeding and cramping precautions given.  Patient to return sooner as needed.     Diagnosis Plan   1. 20 weeks gestation of pregnancy     2. Suspected maternal condition not found  US Ob Follow Up Transabdominal Approach   3. Class 3 severe obesity with body mass index (BMI) of 40.0 to 44.9 in adult, unspecified obesity type, unspecified whether serious comorbidity present (CMS/HCC)     4. Chronic hypertension affecting pregnancy

## 2019-08-29 DIAGNOSIS — E06.3 HASHIMOTO'S THYROIDITIS: ICD-10-CM

## 2019-08-29 DIAGNOSIS — O10.919 CHRONIC HYPERTENSION AFFECTING PREGNANCY: ICD-10-CM

## 2019-09-05 ENCOUNTER — HOSPITAL ENCOUNTER (OUTPATIENT)
Dept: PHYSICAL THERAPY | Facility: HOSPITAL | Age: 29
Setting detail: THERAPIES SERIES
Discharge: HOME | End: 2019-09-05

## 2019-09-05 DIAGNOSIS — O99.891 BACK PAIN AFFECTING PREGNANCY IN SECOND TRIMESTER: Primary | ICD-10-CM

## 2019-09-05 DIAGNOSIS — M54.9 BACK PAIN AFFECTING PREGNANCY IN SECOND TRIMESTER: Primary | ICD-10-CM

## 2019-09-05 PROCEDURE — 97110 THERAPEUTIC EXERCISES: CPT | Performed by: PHYSICAL THERAPIST

## 2019-09-05 PROCEDURE — 97162 PT EVAL MOD COMPLEX 30 MIN: CPT | Performed by: PHYSICAL THERAPIST

## 2019-09-06 NOTE — THERAPY EVALUATION
Outpatient Physical Therapy Pelvic Health Initial Evaluation  [facility]     Patient Name: Jerrica Marquez  : 1990  MRN: [id number]  Today's Date: 2019        Visit Date: 2019  Visit number:   Recheck: 10/3/19  Insurance: [employer]/ [employer]    Patient Active Problem List   Diagnosis   • Hashimoto's thyroiditis   • Vitamin D deficiency   • Class 3 severe obesity with body mass index (BMI) of 40.0 to 44.9 in adult (CMS/HCC)   • Chronic hypertension affecting pregnancy        Past Medical History:   Diagnosis Date   • Abdominal pain     generalized cramping after eating wheat type products   • Acquired hypothyroidism     Secondary to Hashimoto's thyroiditis   • Back pain affecting pregnancy in second trimester    • Dysmenorrhea    • Endogenous obesity    • Excessive or frequent menstruation    • Hashimoto's thyroiditis    • Headache    • Hypertension 2017    gestational hypertension   • Myopia    • Vitamin D deficiency         Past Surgical History:   Procedure Laterality Date   •  SECTION N/A 2017    Procedure:  SECTION PRIMARY;  Surgeon: Jayne Thibodeaux MD;  Location: [facility] LABOR DELIVERY;  Service:    • INJECTION OF MEDICATION  2013    Depo Provera (medroxyprogesterone acetate)   • INJECTION OF MEDICATION  2012    Kenalog x2   • INJECTION OF MEDICATION  2015    Toradol   • INJECTION OF MEDICATION  2015    Zofran   • WISDOM TOOTH EXTRACTION           Visit Dx:    ICD-10-CM ICD-9-CM   1. Back pain affecting pregnancy in second trimester O99.89 646.83    M54.9 724.5           Pelvic Health     Row Name 19 1500             Pregnancy Questions    Number of Pregnancies  2  -SW      Number of Miscarriages  0  -SW      Has the patient had an ?  No  -SW      Number of Children  1 20 months  -SW      Type of Previous Deliveries    -SW      Due Date  20  -SW        User Key  (r) = Recorded By, (t) =  Taken By, (c) = Cosigned By    Initials Name Provider Type    SW Saad Маринаkanwal Michel, PT Physical Therapist        PT Ortho     Row Name 09/05/19 1500       Subjective Comments    Subjective Comments  Since second trimester of first child inc pain to LB and sent into R butt cheek and ankle.  2 epidurals with one failed attempt.  Since delivery, pain has been constant (20 months ago).  Has trouble getting out of floor, difficulty walking.  Has been a little worse with this pregnancy.  consistently R hip pain, but this week transitioned to L side.  Now bilateral side pain. Stays mostly in position with ocassional radicular s/s. Has tried to not take medication to assist.  If she lays on the hip it helps pain.  Flat on the back hurts all the time.  Feels like a locking pain that causes her to think she is stuck.  Most of time feels ok in mornings.  Has had a series of weight gain due to thyroid issues.    -SW       Subjective Pain    Able to rate subjective pain?  yes  -SW    Pre-Treatment Pain Level  1  -SW    Post-Treatment Pain Level  0  -SW       Posture/Observations    Posture- WNL  Posture is WNL  -SW    Posture/Observations Comments  Gait unremarkable without toe drag.  No AD used.  No evidence of toe drag present. Kyphosis at CT junction.  R shoulder elevated with IC up on L side. Genu recurvatum bilaterally.    -SW       Quarter Clearing    Quarter Clearing  Lower Quarter Clearing  -SW       DTR- Lower Quarter Clearing    Patellar tendon (L2-4)  Left:;3- Slightly hyperactive response  -SW    Achilles tendon (S1-2)  Bilateral:;2- Normal response  -SW       Neural Tension Signs- Lower Quarter Clearing    Slump  Negative  -SW    SLR  Negative  -SW       Sensory Screen for Light Touch- Lower Quarter Clearing    L1 (inguinal area)  Intact  -SW    L2 (anterior mid thigh)  Intact  -SW    L3 (distal anterior thigh)  Intact  -SW    L4 (medial lower leg/foot)  Intact  -SW    L5 (lateral lower leg/great toe)  Intact  -SW     S1 (bottom of foot)  Intact  -SW       Myotomal Screen- Lower Quarter Clearing    Hip flexion (L2)  5 (Normal)  -SW    Knee extension (L3)  5 (Normal)  -SW    Ankle DF (L4)  WNL  -SW    Great toe extension (L5)  WNL  -SW    Ankle PF (S1)  WNL  -SW    Knee flexion (S2)  5 (Normal)  -SW       Lumbar ROM Screen- Lower Quarter Clearing    Lumbar Flexion  Normal  -SW    Lumbar Extension  Normal  -SW    Lumbar Lateral Flexion  Normal pain with R SB   -SW    Lumbar Rotation  Normal  -SW       SI/Hip Screen- Lower Quarter Clearing    ASIS compression  Negative relieved s/s   -SW    ASIS distraction  Right:;Positive  -SW    Rhonda's/Roque's test  Right:;Positive  -SW       Special Tests/Palpation    Special Tests/Palpation  Lumbar/SI  -SW       Lumbosacral Accessory Motions    Lumbosacral Accessory Motions Tested?  Yes  -SW    PA glide- Sacral base  -- level but discomfort with AP glide bilaterally  -SW    Innominate rotation  -- level and symmetrical supine  -SW       Lumbar/SI Special Tests    SI Compression Test (SI Dysfunction)  Negative  -SW    SI Distraction Test (SI Dysfunction)  Right:;Positive  -SW    RHONDA (hip vs. SI Dysfunction)  Right:;Positive  -SW       Lumbosacral Palpation    Lumbosacral Palpation?  Yes  -SW    SI  Bilateral:;Tender  -SW    Lumbosacral Segment  Bilateral:;Tender;Guarded/taut  -SW    Piriformis  Right:;Elicits spasm;Trigger point  -    Quadratus Lumborum  Left:;Tender;Guarded/taut  -SW       General ROM    GENERAL ROM COMMENTS  functional all planes  -SW       MMT (Manual Muscle Testing)    General MMT Comments  glut med 3+/5; add 4-/5; abd 4/5  -SW      User Key  (r) = Recorded By, (t) = Taken By, (c) = Cosigned By    Initials Name Provider Type    SW Марина Nash, PT Physical Therapist                     PT Assessment/Plan     Row Name 09/05/19 7855          PT Assessment    Functional Limitations  Limitation in home management;Limitations in community activities;Performance  in leisure activities;Performance in self-care ADL  -     Impairments  Impaired muscle length;Impaired muscle endurance;Impaired postural alignment;Joint mobility;Muscle strength;Pain;Poor body mechanics;Posture  -     Assessment Comments  Patient is a 28 yo female presenting with c/o mechanical LBP with dec alignment symmetry.  She would benefit from skilled therapy to assist with symmetry, core stabilization and improved strength to assist with daily ADLs  -     Rehab Potential  Good  -SW     Patient/caregiver participated in establishment of treatment plan and goals  Yes  -SW     Patient would benefit from skilled therapy intervention  Yes  -SW        PT Plan    PT Frequency  1x/week  -SW     Predicted Duration of Therapy Intervention (Therapy Eval)  8-10 visits  -     PT Plan Comments  Manual therapy as indicated. Ther ex for strength and stabilization. posture and body mechanics.   -       User Key  (r) = Recorded By, (t) = Taken By, (c) = Cosigned By    Initials Name Provider Type    Марина Fox, PT Physical Therapist            OP Exercises     Row Name 09/05/19 1500             Subjective Comments    Subjective Comments  Since second trimester of first child inc pain to LB and sent into R butt cheek and ankle.  2 epidurals with one failed attempt.  Since delivery, pain has been constant (20 months ago).  Has trouble getting out of floor, difficulty walking.  Has been a little worse with this pregnancy.  consistently R hip pain, but this week transitioned to L side.  Now bilateral side pain. Stays mostly in position with ocassional radicular s/s. Has tried to not take medication to assist.  If she lays on the hip it helps pain.  Flat on the back hurts all the time.  Feels like a locking pain that causes her to think she is stuck.  Most of time feels ok in mornings.  Has had a series of weight gain due to thyroid issues.    -SW         Subjective Pain    Able to rate subjective pain?  yes   -SW      Pre-Treatment Pain Level  1  -SW      Post-Treatment Pain Level  0  -SW         Exercise 1    Exercise Name 1  piriformis stretch   -SW      Reps 1  3  -SW      Time 1  30 sec  -SW         Exercise 2    Exercise Name 2  angry cat stretch   -SW      Reps 2  10  -SW      Time 2  5 sec  -SW         Exercise 3    Exercise Name 3  hamstring stretch   -SW      Reps 3  3  -SW      Time 3  30 sec  -SW        User Key  (r) = Recorded By, (t) = Taken By, (c) = Cosigned By    Initials Name Provider Type    Марина Fox PT Physical Therapist                      PT OP Goals     Row Name 09/05/19 1900          PT Short Term Goals    STG Date to Achieve  10/03/19  -     STG 1  independent with HEP for stretching and stabilization  -SW     STG 1 Progress  New  -     STG 2  complete supine to sit t/f with spinal neutral mechanics without cues.   -SW     STG 2 Progress  New  -     STG 3  patient to show inc core stability with ASLR at 3 or less.   -SW     STG 3 Progress  New  -     STG 4  subjectively report 50 % improvement in pain  -     STG 4 Progress  New  -        Long Term Goals    LTG Date to Achieve  12/05/19  -     LTG 1  subjective improvement 60-70%  -     LTG 1 Progress  New  -     LTG 2  improved muscle tone to LE to 4/5 grossly or stronger to allow improved stability to pelvic ring, such to dec WB pain with gait.   -SW     LTG 2 Progress  New  -     LTG 3  reduced muscle triggers to piriformis and lumbar spine to allow palpation without inc pain.   -SW     LTG 3 Progress  New  -        Time Calculation    PT Goal Re-Cert Due Date  10/03/19  -       User Key  (r) = Recorded By, (t) = Taken By, (c) = Cosigned By    Initials Name Provider Type    Марина Fox, PT Physical Therapist          Therapy Education  Given: HEP  Program: New  How Provided: Demonstration, Verbal, Written  Provided to: Patient  Level of Understanding: Verbalized, Demonstrated, Teach back  education performed               Time Calculation:   Start Time: 1505  Stop Time: 1600  Time Calculation (min): 55 min  Therapy Charges for Today     Code Description Service Date Service Provider Modifiers Qty    48492982701 HC PT THER PROC EA 15 MIN 9/5/2019 Марина Nash, PT GP 1    [id number]  PT EVAL MOD COMPLEXITY 3 9/5/2019 Марина Nash, PT GP 1                  Марина Nash, PT  9/5/2019

## 2019-09-12 ENCOUNTER — APPOINTMENT (OUTPATIENT)
Dept: PHYSICAL THERAPY | Facility: HOSPITAL | Age: 29
End: 2019-09-12

## 2019-09-17 ENCOUNTER — ROUTINE PRENATAL (OUTPATIENT)
Dept: OBSTETRICS AND GYNECOLOGY | Facility: CLINIC | Age: 29
End: 2019-09-17

## 2019-09-17 ENCOUNTER — HOSPITAL ENCOUNTER (OUTPATIENT)
Dept: PHYSICAL THERAPY | Facility: HOSPITAL | Age: 29
Setting detail: THERAPIES SERIES
Discharge: HOME | End: 2019-09-17

## 2019-09-17 VITALS — WEIGHT: 261 LBS | BODY MASS INDEX: 43.43 KG/M2 | SYSTOLIC BLOOD PRESSURE: 118 MMHG | DIASTOLIC BLOOD PRESSURE: 69 MMHG

## 2019-09-17 DIAGNOSIS — O09.299 HISTORY OF PRE-ECLAMPSIA IN PRIOR PREGNANCY, CURRENTLY PREGNANT: ICD-10-CM

## 2019-09-17 DIAGNOSIS — M54.9 BACK PAIN AFFECTING PREGNANCY IN SECOND TRIMESTER: ICD-10-CM

## 2019-09-17 DIAGNOSIS — O34.211 ENCOUNTER FOR MATERNAL CARE FOR LOW TRANSVERSE SCAR FROM PREVIOUS CESAREAN DELIVERY: ICD-10-CM

## 2019-09-17 DIAGNOSIS — E06.3 HASHIMOTO'S THYROIDITIS: Primary | ICD-10-CM

## 2019-09-17 DIAGNOSIS — O99.212 OBESITY AFFECTING PREGNANCY IN SECOND TRIMESTER: ICD-10-CM

## 2019-09-17 DIAGNOSIS — Z36.89 ENCOUNTER FOR OTHER SPECIFIED ANTENATAL SCREENING: ICD-10-CM

## 2019-09-17 DIAGNOSIS — O99.891 BACK PAIN AFFECTING PREGNANCY IN SECOND TRIMESTER: Primary | ICD-10-CM

## 2019-09-17 DIAGNOSIS — M54.9 BACK PAIN AFFECTING PREGNANCY IN SECOND TRIMESTER: Primary | ICD-10-CM

## 2019-09-17 DIAGNOSIS — O99.891 BACK PAIN AFFECTING PREGNANCY IN SECOND TRIMESTER: ICD-10-CM

## 2019-09-17 DIAGNOSIS — O10.912 MATERNAL CHRONIC HYPERTENSION IN SECOND TRIMESTER: ICD-10-CM

## 2019-09-17 DIAGNOSIS — Z3A.23 23 WEEKS GESTATION OF PREGNANCY: ICD-10-CM

## 2019-09-17 PROCEDURE — 97110 THERAPEUTIC EXERCISES: CPT | Performed by: PHYSICAL THERAPIST

## 2019-09-17 PROCEDURE — 0502F SUBSEQUENT PRENATAL CARE: CPT | Performed by: NURSE PRACTITIONER

## 2019-09-17 NOTE — PROGRESS NOTES
CC: Prenatal visit; hx reviewed and updated.    Jerrica Marquez is a 29 y.o.  at 23w5d. Denies dysuria, vb, LOF, abnormal vaginal d/c, headaches, swelling, heartburn or constipation.    /69   Wt 118 kg (261 lb)   LMP 2019 (Approximate)   BMI 43.43 kg/m²     F/U anatomy today: EFW- 1lb 7oz. FABRIZIO- 16.36cm. Left, lateral, fundal placenta w/o previa, w/ normal insertion of a 3VC. All previous suboptimal images were obtained today and fetal anatomy appears normal at this time.     2nd pregnancy Problems (from 06/10/19 to present)     Problem Noted Resolved    Back pain affecting pregnancy in second trimester 2019 by Mahnaz Garay APRN No    Overview Signed 2019  4:04 PM by Mahnaz Garay APRN     Seeing Марина for PT         Encounter for maternal care for low transverse scar from previous  delivery 2019 by Mahnaz Garay APRN No    Overview Signed 2019  4:04 PM by Mahnaz Garay APRN     x1  Plans for repeat         History of pre-eclampsia in prior pregnancy, currently pregnant 2019 by Mahnaz Garay APRN No    Overview Addendum 2019  4:12 PM by Mahnaz Garay APRN     Taking daily ASA 81mg until 36 weeks  Baseline HTN labs done at [facility] on 19- all WNL         Maternal chronic hypertension in second trimester 2019 by Vazquez Davis DO No    Overview Signed 2019  9:49 AM by Vazquez Davis DO     Currently on labetalol 100 mg twice daily for chronic hypertension currently well controlled.  Patient also on baby aspirin 81 mg for history of preeclampsia in the last pregnancy.  Plan to get serial growth scans on this patient, and  testing starting at 32 weeks, would recommend delivery no later than 39 weeks.         Class 3 severe obesity with body mass index (BMI) of 40.0 to 44.9 in adult (CMS/HCC) 2018 by Beto Aquino APRN No    Hashimoto's thyroiditis 2016 by Beto Aquino APRN No     Overview Addendum 2019  4:07 PM by Mahnaz Garay APRN     Patient currently on 175 mcg of Synthroid, being followed by endocrine for this problem.      TSH monthly per REBECA Salvador with TSH goal of < 2.5               A/P: Jerrica Marquez is a 29 y.o.  at 23w5d.  - RTC in 3 weeks     Diagnosis Plan   1. Hashimoto's thyroiditis     2. Maternal chronic hypertension in second trimester     3. Obesity affecting pregnancy in second trimester     4. Encounter for other specified  screening  CBC (No Diff)    Glucose, Post 50 Gm Glucola   5. Back pain affecting pregnancy in second trimester     6. 23 weeks gestation of pregnancy  CBC (No Diff)    Glucose, Post 50 Gm Glucola   7. Encounter for maternal care for low transverse scar from previous  delivery     8. History of pre-eclampsia in prior pregnancy, currently pregnant         REBECA Haney  2019  4:13 PM

## 2019-09-17 NOTE — THERAPY TREATMENT NOTE
Outpatient Physical Therapy Pelvic Health Treatment Note  [facility]     Patient Name: Jerrica Marquez  : 1990  MRN: [id number]  Today's Date: 2019        Visit Date: 2019  Visit number:   Recheck: 10/3/19  Insurance: [employer]    Visit Dx:    ICD-10-CM ICD-9-CM   1. Back pain affecting pregnancy in second trimester O99.89 646.83    M54.9 724.5       Patient Active Problem List   Diagnosis   • Hashimoto's thyroiditis   • Vitamin D deficiency   • Class 3 severe obesity with body mass index (BMI) of 40.0 to 44.9 in adult (CMS/[facility])   • Maternal chronic hypertension in second trimester   • Back pain affecting pregnancy in second trimester   • Obesity affecting pregnancy in second trimester   • Encounter for maternal care for low transverse scar from previous  delivery   • History of pre-eclampsia in prior pregnancy, currently pregnant        Pelvic Health     Row Name 19 1500             Pregnancy Questions    Number of Pregnancies  2  -SW      Number of Miscarriages  0  -SW      Has the patient had an ?  No  -SW      Number of Children  1 20 months  -SW      How many weeks pregnant are you?  24 weeks  -SW      Type of Previous Deliveries    -SW      Due Date  20  -SW        User Key  (r) = Recorded By, (t) = Taken By, (c) = Cosigned By    Initials Name Provider Type    Марина Fox, PT Physical Therapist        PT Ortho     Row Name 19 1500       Subjective Comments    Subjective Comments  Patient was out last week due to sickness of child.  She, however, is feeling pretty good.  She relates to therapist that she had an injury years ago to R ankle.  Did a lot of cleaning over the weekend and felt inc'd stress to R ankle. Seemed to hurt all over thereafter. Has not been able to do exercises daily due to busy schedule, but tries to get most of them in most of the time.  -SW       Subjective Pain    Able to rate  subjective pain?  yes  -SW    Pre-Treatment Pain Level  4  -SW    Post-Treatment Pain Level  0  -SW       Posture/Observations    Posture- WNL  Posture is WNL  -SW    Posture/Observations Comments  Happy demeanor.  Gait nonantalgic. L ant innom and sacral alignment good.   -SW       Quarter Clearing    Quarter Clearing  Lower Quarter Clearing  -SW       DTR- Lower Quarter Clearing    Patellar tendon (L2-4)  Left:;3- Slightly hyperactive response  -SW    Achilles tendon (S1-2)  Bilateral:;2- Normal response  -SW       Neural Tension Signs- Lower Quarter Clearing    Slump  Negative  -SW    SLR  Negative  -SW       Sensory Screen for Light Touch- Lower Quarter Clearing    L1 (inguinal area)  Intact  -SW    L2 (anterior mid thigh)  Intact  -SW    L3 (distal anterior thigh)  Intact  -SW    L4 (medial lower leg/foot)  Intact  -SW    L5 (lateral lower leg/great toe)  Intact  -SW    S1 (bottom of foot)  Intact  -SW       Myotomal Screen- Lower Quarter Clearing    Hip flexion (L2)  5 (Normal)  -SW    Knee extension (L3)  5 (Normal)  -SW    Ankle DF (L4)  WNL  -SW    Great toe extension (L5)  WNL  -SW    Ankle PF (S1)  WNL  -SW    Knee flexion (S2)  5 (Normal)  -SW       Lumbar ROM Screen- Lower Quarter Clearing    Lumbar Flexion  Normal  -SW    Lumbar Extension  Normal  -SW    Lumbar Lateral Flexion  Normal pain with R SB   -SW    Lumbar Rotation  Normal  -SW       SI/Hip Screen- Lower Quarter Clearing    ASIS compression  Negative relieved s/s   -SW    ASIS distraction  Right:;Positive  -SW    Rhonda's/Roque's test  Right:;Positive  -SW       Special Tests/Palpation    Special Tests/Palpation  Lumbar/SI  -SW       Lumbosacral Accessory Motions    Lumbosacral Accessory Motions Tested?  Yes  -SW    PA glide- Sacral base  -- level  -SW    Innominate rotation  -- L ant innom   -SW       Lumbar/SI Special Tests    SI Compression Test (SI Dysfunction)  Negative  -SW    SI Distraction Test (SI Dysfunction)  Right:;Positive  -SW     SANDRA (hip vs. SI Dysfunction)  Right:;Positive  -SW       Lumbosacral Palpation    Lumbosacral Palpation?  Yes  -SW    SI  Bilateral:;Tender  -SW    Lumbosacral Segment  Bilateral:;Tender;Guarded/taut  -SW    Piriformis  Right:;Elicits spasm;Trigger point  -SW    Quadratus Lumborum  Left:;Tender;Guarded/taut  -SW      User Key  (r) = Recorded By, (t) = Taken By, (c) = Cosigned By    Initials Name Provider Type    Марина Fox, PT Physical Therapist                     PT Assessment/Plan     Row Name 09/17/19 1700 09/17/19 1500       PT Assessment    Functional Limitations  --  Limitation in home management;Limitations in community activities;Performance in leisure activities;Performance in self-care ADL  -    Impairments  --  Impaired muscle length;Impaired muscle endurance;Impaired postural alignment;Joint mobility;Muscle strength;Pain;Poor body mechanics;Posture  -SW    Assessment Comments  Patient presented this date with slight discomfort over weekend from inc activity.  She reports prior ankle injury that was aggravated by inc housework over the week.  Patient presented with mild radicular s/s at induction of therapy visit, but quickly dec with spinal extension program.  Post treatment, no pain reported.  Will continue to monitor but making good adjustments to HEP well.  Encouraged good shoewear when cleaning as to give support to foot and ankle.  Patient may benefit from TB stability for ankle to help strengthen/stability foot/ankle complex.   -SW  Patient managing well with exercise program.  Compliant with exercise most of time, but ocassional miss.  Pain elevated slightly due to cleaning house over weekend. Sore all over.     -SW    Rehab Potential  Good  -SW  Good  -SW    Patient/caregiver participated in establishment of treatment plan and goals  Yes  -SW  Yes  -SW    Patient would benefit from skilled therapy intervention  Yes  -SW  Yes  -SW       PT Plan    PT Frequency  1x/week  -SW   1x/week  -SW    Predicted Duration of Therapy Intervention (Therapy Eval)  8-10 visits  -SW  8-10 visits  -SW    PT Plan Comments  continue with spinal extension program as beneficial in management of centralized LBP.  Advance to physioball mobility exercises next visit.  Consider advancements to stability. Consider heel cord stretch and ankle stabilization exercises as needed.  -SW  --      User Key  (r) = Recorded By, (t) = Taken By, (c) = Cosigned By    Initials Name Provider Type    Марина Fox, PT Physical Therapist            OP Exercises     Row Name 09/17/19 1500             Subjective Comments    Subjective Comments  Patient was out last week due to sickness of child.  She, however, is feeling pretty good.  She relates to therapist that she had an injury years ago to R ankle.  Did a lot of cleaning over the weekend and felt inc'd stress to R ankle. Seemed to hurt all over thereafter. Has not been able to do exercises daily due to busy schedule, but tries to get most of them in most of the time.  -SW         Subjective Pain    Able to rate subjective pain?  yes  -SW      Pre-Treatment Pain Level  4  -SW      Post-Treatment Pain Level  0  -SW         Exercise 1    Exercise Name 1  piriformis stretch   -SW      Reps 1  3  -SW      Time 1  30 sec  -SW         Exercise 2    Exercise Name 2  angry cat stretch   -SW      Reps 2  10  -SW      Time 2  5 sec  -SW         Exercise 3    Exercise Name 3  hamstring stretch   -SW      Reps 3  3  -SW      Time 3  30 sec  -SW         Exercise 4    Exercise Name 4  Adductor stretch   -SW      Reps 4  3  -SW      Time 4  30 sec  -SW         Exercise 5    Exercise Name 5  prone on elbows  -SW      Time 5  3 min  -SW         Exercise 6    Exercise Name 6  prone press ups  -SW      Reps 6  10  -SW      Time 6  5 sec  -SW         Exercise 7    Exercise Name 7  prone pelvic brace  -SW      Time 7  4 min  -SW      Additional Comments  able to demo with min tactile and  mod verbalized cues for performance  -SW         Exercise 8    Exercise Name 8  prone brace with TKE  -SW      Sets 8  2  -SW      Reps 8  10  -SW        User Key  (r) = Recorded By, (t) = Taken By, (c) = Cosigned By    Initials Name Provider Type    Марина Fox, PT Physical Therapist                      PT OP Goals     Row Name 09/17/19 1500          PT Short Term Goals    STG Date to Achieve  10/03/19  -     STG 1  independent with HEP for stretching and stabilization  -SW     STG 1 Progress  Progressing  -     STG 2  complete supine to sit t/f with spinal neutral mechanics without cues.   -SW     STG 2 Progress  Progressing  -     STG 3  patient to show inc core stability with ASLR at 3 or less.   -     STG 3 Progress  New  -     STG 4  subjectively report 50 % improvement in pain  -     STG 4 Progress  New  -        Long Term Goals    LTG Date to Achieve  12/05/19  -     LTG 1  subjective improvement 60-70%  -     LTG 1 Progress  New  -     LTG 2  improved muscle tone to LE to 4/5 grossly or stronger to allow improved stability to pelvic ring, such to dec WB pain with gait.   -     LTG 2 Progress  New  -     LTG 3  reduced muscle triggers to piriformis and lumbar spine to allow palpation without inc pain.   -     LTG 3 Progress  New  -        Time Calculation    PT Goal Re-Cert Due Date  10/03/19  -       User Key  (r) = Recorded By, (t) = Taken By, (c) = Cosigned By    Initials Name Provider Type    Марина Fox, PT Physical Therapist           Therapy Education  Given: HEP  Program: Reinforced, Progressed  How Provided: Verbal, Demonstration  Provided to: Patient  Level of Understanding: Teach back education performed, Verbalized, Demonstrated              Time Calculation:   Start Time: 1506  Stop Time: 1610  Time Calculation (min): 64 min  Therapy Charges for Today     Code Description Service Date Service Provider Modifiers Qty    31256001366  PT THER  PROC EA 15 MIN 9/17/2019 Марина Nash, PT GP 4                    Марина Nash, PT  9/17/2019

## 2019-09-19 ENCOUNTER — LAB (OUTPATIENT)
Dept: LAB | Facility: HOSPITAL | Age: 29
End: 2019-09-19

## 2019-09-19 DIAGNOSIS — E06.3 HASHIMOTO'S THYROIDITIS: ICD-10-CM

## 2019-09-19 LAB — TSH SERPL DL<=0.05 MIU/L-ACNC: 0.92 UIU/ML (ref 0.27–4.2)

## 2019-09-19 PROCEDURE — 36415 COLL VENOUS BLD VENIPUNCTURE: CPT

## 2019-09-19 PROCEDURE — 84443 ASSAY THYROID STIM HORMONE: CPT

## 2019-09-20 ENCOUNTER — TELEPHONE (OUTPATIENT)
Dept: ENDOCRINOLOGY | Facility: CLINIC | Age: 29
End: 2019-09-20

## 2019-09-25 DIAGNOSIS — Z03.79 SUSPECTED MATERNAL CONDITION NOT FOUND: ICD-10-CM

## 2019-09-26 ENCOUNTER — HOSPITAL ENCOUNTER (OUTPATIENT)
Dept: PHYSICAL THERAPY | Facility: HOSPITAL | Age: 29
Setting detail: THERAPIES SERIES
Discharge: HOME | End: 2019-09-26

## 2019-09-26 DIAGNOSIS — O99.891 BACK PAIN AFFECTING PREGNANCY IN SECOND TRIMESTER: Primary | ICD-10-CM

## 2019-09-26 DIAGNOSIS — M54.9 BACK PAIN AFFECTING PREGNANCY IN SECOND TRIMESTER: Primary | ICD-10-CM

## 2019-09-26 PROCEDURE — 97110 THERAPEUTIC EXERCISES: CPT | Performed by: PHYSICAL THERAPIST

## 2019-09-26 NOTE — THERAPY TREATMENT NOTE
Outpatient Physical Therapy Pelvic Health Treatment Note  [facility]     Patient Name: Jerrica Marquez  : 1990  MRN: [id number]  Today's Date: 2019        Visit Date: 2019  Visit number: 3/3  Recheck: 10/3/19  Insurance: [employer]    Visit Dx:    ICD-10-CM ICD-9-CM   1. Back pain affecting pregnancy in second trimester O99.89 646.83    M54.9 724.5       Patient Active Problem List   Diagnosis   • Hashimoto's thyroiditis   • Vitamin D deficiency   • Class 3 severe obesity with body mass index (BMI) of 40.0 to 44.9 in adult (CMS/[facility])   • Maternal chronic hypertension in second trimester   • Back pain affecting pregnancy in second trimester   • Obesity affecting pregnancy in second trimester   • Encounter for maternal care for low transverse scar from previous  delivery   • History of pre-eclampsia in prior pregnancy, currently pregnant        Pelvic Health     Row Name 19             Pregnancy Questions    Number of Children  1 20 months  -SW      Type of Previous Deliveries    -SW      Due Date  20  -SW         Lumbar/SI Special Tests    SI Compression Test (SI Dysfunction)  Negative  -SW      SI Distraction Test (SI Dysfunction)  Right:;Positive  -SW      SANDRA (hip vs. SI Dysfunction)  Right:;Positive  -SW         Lumbosacral Palpation    Lumbosacral Palpation?  Yes  -SW      SI  -- level   -SW         Lumbosacral Accessory Motions    Lumbosacral Accessory Motions Tested?  Yes  -SW      PA glide- Sacral base  -- level  -SW      Innominate rotation  -- level  -SW        User Key  (r) = Recorded By, (t) = Taken By, (c) = Cosigned By    Initials Name Provider Type    SW Марина Nash, PT Physical Therapist        PT Ortho     Row Name 19 09       SI/Hip Screen- Lower Quarter Clearing    ASIS distraction  Negative  -SW    Sandra's/Roque's test  Negative  -SW    Row Name 19 08       Subjective Comments     Subjective Comments  Back is doing great today.  NO radicular s/s at all.  Has been doing exercises daily and wearing shoes regularly with housework.  Percy horse this morning in R leg, same leg with ankle injury.  Cramping and week.  Able to get in and out of floor without assistance the other day.  Feels very accomplished.    -       Subjective Pain    Able to rate subjective pain?  yes  -    Pre-Treatment Pain Level  0  -SW    Post-Treatment Pain Level  0  -SW       Posture/Observations    Posture- WNL  Posture is WNL  -    Posture/Observations Comments  Good spirits.  Gait slightly slower this morning but not antalgic.  Tenderness in soleus of R calf.  No swelling or bruising.   -      User Key  (r) = Recorded By, (t) = Taken By, (c) = Cosigned By    Initials Name Provider Type    Марина Fox, PT Physical Therapist                     PT Assessment/Plan     Row Name 09/26/19 0800          PT Assessment    Assessment Comments  Domenico reports significant improvement in LB.  No longer experiences radicular s/s.  She presented with new onset of muscle cramping into calf on R side.  Questions about anemia potential as she has been borderline in the past due to anemia.  Issued new HEP for ankle stretch and stabilization which patient responded well in completion.  Overall progressing well with goals and improved symptoms.    -SW     Rehab Potential  Good  -     Patient/caregiver participated in establishment of treatment plan and goals  Yes  -SW     Patient would benefit from skilled therapy intervention  Yes  -SW        PT Plan    Predicted Duration of Therapy Intervention (Therapy Eval)  8-10 visits  -     PT Plan Comments  Continue with dynamic ankle  and blanace next visit. Continue with back stabilization. Add physioball next visit.   -       User Key  (r) = Recorded By, (t) = Taken By, (c) = Cosigned By    Initials Name Provider Type    Марина Fox, PT Physical Therapist             OP Exercises     Row Name 09/26/19 0800             Subjective Comments    Subjective Comments  Back is doing great today.  NO radicular s/s at all.  Has been doing exercises daily and wearing shoes regularly with housework.  Percy horse this morning in R leg, same leg with ankle injury.  Cramping and week.  Able to get in and out of floor without assistance the other day.  Feels very accomplished.    -SW         Subjective Pain    Able to rate subjective pain?  yes  -SW      Pre-Treatment Pain Level  0  -SW      Post-Treatment Pain Level  0  -SW         Exercise 1    Exercise Name 1  heel cord stretch: gastroc standing  -SW      Reps 1  3  -SW      Time 1  30 sec  -SW         Exercise 2    Exercise Name 2  heel cord stretching: soleus stretch   -SW      Reps 2  3  -SW      Time 2  30 sec  -SW         Exercise 3    Exercise Name 3  hamstring stretch   -SW      Reps 3  3  -SW      Time 3  30 sec  -SW         Exercise 4    Exercise Name 4  Adductor stretch   -SW      Reps 4  3  -SW      Time 4  30 sec  -SW         Exercise 5    Exercise Name 5  TB 4 way R ankle  -SW      Reps 5  12 bilaterally   -SW         Exercise 6    Exercise Name 6  prone on elbows  -SW      Time 6  3 min  -SW         Exercise 7    Exercise Name 7  prone press ups  -SW      Reps 7  10  -SW      Time 7  5 sec  -SW         Exercise 8    Exercise Name 8  prone TKE with brace  -SW      Sets 8  2  -SW      Reps 8  10  -SW        User Key  (r) = Recorded By, (t) = Taken By, (c) = Cosigned By    Initials Name Provider Type    Марина Fox, PT Physical Therapist                      PT OP Goals     Row Name 09/26/19 0800          PT Short Term Goals    STG Date to Achieve  10/03/19  -SW     STG 1  independent with HEP for stretching and stabilization  -SW     STG 1 Progress  Progressing  -SW     STG 2  complete supine to sit t/f with spinal neutral mechanics without cues.   -SW     STG 2 Progress  Progressing  -SW     STG 3  patient  to show inc core stability with ASLR at 3 or less.   -     STG 3 Progress  New  -     STG 4  subjectively report 50 % improvement in pain  -     STG 4 Progress  Progressing  -        Long Term Goals    LTG Date to Achieve  12/05/19  -     LTG 1  subjective improvement 60-70%  -     LTG 1 Progress  New  -     LTG 2  improved muscle tone to LE to 4/5 grossly or stronger to allow improved stability to pelvic ring, such to dec WB pain with gait.   -     LTG 2 Progress  New  -     LTG 3  reduced muscle triggers to piriformis and lumbar spine to allow palpation without inc pain.   -     LTG 3 Progress  New  -        Time Calculation    PT Goal Re-Cert Due Date  10/03/19  -       User Key  (r) = Recorded By, (t) = Taken By, (c) = Cosigned By    Initials Name Provider Type    Марина Fox, EMANUEL Physical Therapist           Therapy Education  Given: HEP  Program: Reinforced, Progressed  How Provided: Verbal, Demonstration, Written  Provided to: Patient  Level of Understanding: Teach back education performed, Verbalized, Demonstrated              Time Calculation:   Start Time: 0816  Stop Time: 0917  Time Calculation (min): 61 min  Therapy Charges for Today     Code Description Service Date Service Provider Modifiers Qty    73743371369 HC PT THER PROC EA 15 MIN 9/26/2019 Марина Nash, PT GP 4                    Марина Nash, PT  9/26/2019

## 2019-10-04 ENCOUNTER — HOSPITAL ENCOUNTER (OUTPATIENT)
Dept: PHYSICAL THERAPY | Facility: HOSPITAL | Age: 29
Setting detail: THERAPIES SERIES
Discharge: HOME | End: 2019-10-04

## 2019-10-04 DIAGNOSIS — M54.9 BACK PAIN AFFECTING PREGNANCY IN SECOND TRIMESTER: Primary | ICD-10-CM

## 2019-10-04 DIAGNOSIS — O99.891 BACK PAIN AFFECTING PREGNANCY IN SECOND TRIMESTER: Primary | ICD-10-CM

## 2019-10-04 PROCEDURE — 97110 THERAPEUTIC EXERCISES: CPT | Performed by: PHYSICAL THERAPIST

## 2019-10-04 NOTE — THERAPY RE-EVALUATION
Outpatient Physical Therapy Pelvic Health Re-Assessment  [facility]     Patient Name: Jerrica Marquez  : 1990  MRN: [id number]  Today's Date: 10/4/2019        Visit Date: 10/04/2019   Visit Number:   Recheck: 19  Insurance: [employer]  Improvement: 90%      Patient Active Problem List   Diagnosis   • Hashimoto's thyroiditis   • Vitamin D deficiency   • Class 3 severe obesity with body mass index (BMI) of 40.0 to 44.9 in adult (CMS/HCC)   • Maternal chronic hypertension in second trimester   • Back pain affecting pregnancy in second trimester   • Obesity affecting pregnancy in second trimester   • Encounter for maternal care for low transverse scar from previous  delivery   • History of pre-eclampsia in prior pregnancy, currently pregnant        Past Medical History:   Diagnosis Date   • Abdominal pain     generalized cramping after eating wheat type products   • Acquired hypothyroidism     Secondary to Hashimoto's thyroiditis   • Back pain affecting pregnancy in second trimester    • Dysmenorrhea    • Endogenous obesity    • Excessive or frequent menstruation    • Hashimoto's thyroiditis    • Headache    • Hypertension 2017    gestational hypertension   • Myopia    • Vitamin D deficiency         Past Surgical History:   Procedure Laterality Date   •  SECTION N/A 2017    Procedure:  SECTION PRIMARY;  Surgeon: Jayne Thibodeaux MD;  Location: [facility] LABOR DELIVERY;  Service:    • INJECTION OF MEDICATION  2013    Depo Provera (medroxyprogesterone acetate)   • INJECTION OF MEDICATION  2012    Kenalog x2   • INJECTION OF MEDICATION  2015    Toradol   • INJECTION OF MEDICATION  2015    Zofran   • WISDOM TOOTH EXTRACTION           Visit Dx:    ICD-10-CM ICD-9-CM   1. Back pain affecting pregnancy in second trimester O99.89 646.83    M54.9 724.5           Pelvic Health     Row Name 10/04/19 0800             Pregnancy  Questions    Number of Pregnancies  2  -SW      Number of Miscarriages  0  -SW      Has the patient had an ?  No  -SW      Number of Children  1 20 months  -SW      How many weeks pregnant are you?  26 weeks  -SW      Type of Previous Deliveries    -SW      Due Date  20  -SW         Lumbar/SI Special Tests    SI Compression Test (SI Dysfunction)  Negative  -SW         Lumbosacral Accessory Motions    Lumbosacral Accessory Motions Tested?  Yes  -        User Key  (r) = Recorded By, (t) = Taken By, (c) = Cosigned By    Initials Name Provider Type    Марина Fox, PT Physical Therapist        PT Ortho     Row Name 10/04/19 0800       Subjective Comments    Subjective Comments  I am great.  Has absolutely no complaints at all.  It has been the best week.  Has been doing exercises and feels that she is moving around better.  Also feels baby has moved which has helped some.  MD follow up on Tuesday. No LOB this week.  Has really been good this week.  Subjective 90% better.  No longer getting spasms in neck nor Percy horses in legs.  Has inc water intake and iron intake at home to help with cramping.   -SW       Subjective Pain    Able to rate subjective pain?  yes  -SW    Pre-Treatment Pain Level  0  -SW    Post-Treatment Pain Level  0  -SW       Posture/Observations    Posture- WNL  Posture is WNL  -SW    Posture/Observations Comments  coversational this am with good spirits upon arrival.  Gait nonantalgic. Improved postural awareness.   -SW       Neural Tension Signs- Lower Quarter Clearing    Slump  Negative  -SW    SLR  Negative  -SW       Myotomal Screen- Lower Quarter Clearing    Hip flexion (L2)  5 (Normal)  -SW    Knee extension (L3)  5 (Normal)  -SW    Knee flexion (S2)  5 (Normal)  -SW       Lumbosacral Accessory Motions    PA glide- Sacral base  -- good symmetry remains to SI; good AP glide noted.   -SW    Innominate rotation  -- symmetry identified this date  -SW        Lumbar/SI Special Tests    SI Distraction Test (SI Dysfunction)  Negative  -    SANDRA (hip vs. SI Dysfunction)  Right:;Positive mild with intensity of discomfort; slightly tighter than L  -       Lumbosacral Palpation    Lumbosacral Palpation?  No Tenderness/Abnormality  -    SI  --  -SW    Lumbosacral Segment  --  -    Piriformis  --  -    Quadratus Lumborum  --  -       General ROM    GENERAL ROM COMMENTS  functional all planes of movement  -       MMT (Manual Muscle Testing)    General MMT Comments  Hip Abd 4/5, Add 4-/5 and glut med 4/5  -       Transfers    Comment (Transfers)  proper use of log roll technique with spinal protection.  -      User Key  (r) = Recorded By, (t) = Taken By, (c) = Cosigned By    Initials Name Provider Type    Марина Fox PT Physical Therapist                     PT Assessment/Plan     Row Name 10/04/19 0800          PT Assessment    Assessment Comments  Patient is showing promising outcome with PT  intervention.  Pain is down to LB without radicular s/s noted.  Alignment improved without need for MET.  Improved postural awareness with inc engagement of TA with activities.  Overall goal  progression is excellent but needs consistency.    -     Rehab Potential  Good  -     Patient/caregiver participated in establishment of treatment plan and goals  Yes  -     Patient would benefit from skilled therapy intervention  Yes  -        PT Plan    PT Frequency  -- FU in 2 weeks  -     Predicted Duration of Therapy Intervention (Therapy Eval)  8-10 visits  -     PT Plan Comments  continue with stabilization training.  Add resistance band to seated neutral activity.  -       User Key  (r) = Recorded By, (t) = Taken By, (c) = Cosigned By    Initials Name Provider Type    Марина Fox, PT Physical Therapist            OP Exercises     Row Name 10/04/19 0800             Subjective Comments    Subjective Comments  I am great.  Has absolutely no  complaints at all.  It has been the best week.  Has been doing exercises and feels that she is moving around better.  Also feels baby has moved which has helped some.  MD follow up on Tuesday. No LOB this week.  Has really been good this week.  Subjective 90% better.  No longer getting spasms in neck nor Percy horses in legs.  Has inc water intake and iron intake at home to help with cramping.   -SW         Subjective Pain    Able to rate subjective pain?  yes  -SW      Pre-Treatment Pain Level  0  -SW      Post-Treatment Pain Level  0  -SW         Exercise 1    Exercise Name 1  hamstring stretch seated  -SW      Reps 1  2  -SW      Time 1  30 sec  -SW         Exercise 2    Exercise Name 2  adductor stretch seated  -SW      Reps 2  2  -SW      Time 2  30 sec  -SW         Exercise 3    Exercise Name 3  heel cord stretch with towel   -SW      Reps 3  2  -SW      Time 3  30 sec  -SW         Exercise 4    Exercise Name 4  physioball tilts lateral and anterior/posterior  -SW      Time 4  2 min each   -SW         Exercise 5    Exercise Name 5  physioball circles CW/CCW  -SW      Time 5  2 min each  -SW         Exercise 6    Exercise Name 6  prone on elbows  -SW      Time 6  2 min  -SW         Exercise 7    Exercise Name 7  prone press ups  -SW      Sets 7  2  -SW      Reps 7  5  -SW      Time 7  5 sec  -SW         Exercise 8    Exercise Name 8  angry cat stretch   -SW      Reps 8  10  -SW      Time 8  5 sec  -SW         Exercise 9    Exercise Name 9  pelvic brace with clamshells  -SW      Reps 9  20  -SW         Exercise 10    Exercise Name 10  pelvic brace with hip abd  -SW      Reps 10  20  -SW        User Key  (r) = Recorded By, (t) = Taken By, (c) = Cosigned By    Initials Name Provider Type    SW Марина Nash, PT Physical Therapist                      PT OP Goals     Row Name 10/04/19 0800          PT Short Term Goals    STG Date to Achieve  10/03/19  -SW     STG 1  independent with HEP for stretching and  stabilization  -     STG 1 Progress  Met  -     STG 2  complete supine to sit t/f with spinal neutral mechanics without cues.   -     STG 2 Progress  Met  -     STG 3  patient to show inc core stability with ASLR at 3 or less.   -     STG 3 Progress  Progressing  -     STG 4  subjectively report 50 % improvement in pain  -     STG 4 Progress  Met  -        Long Term Goals    LTG Date to Achieve  12/05/19  -     LTG 1  subjective improvement 60-70%  -     LTG 1 Progress  Met  -     LTG 2  improved muscle tone to LE to 4/5 grossly or stronger to allow improved stability to pelvic ring, such to dec WB pain with gait.   -     LTG 2 Progress  Progressing  -     LTG 2 Progress Comments  needs consistency  -     LTG 3  reduced muscle triggers to piriformis and lumbar spine to allow palpation without inc pain.   -     LTG 3 Progress  Progressing  -        Time Calculation    PT Goal Re-Cert Due Date  11/01/19  -       User Key  (r) = Recorded By, (t) = Taken By, (c) = Cosigned By    Initials Name Provider Type    Марина Fox PT Physical Therapist                          Time Calculation:   Start Time: 0820  Stop Time: 0915  Time Calculation (min): 55 min  Therapy Charges for Today     Code Description Service Date Service Provider Modifiers Qty    97506179739 HC PT THER PROC EA 15 MIN 10/4/2019 Марина Nash, PT GP 4                  Марина Nash, PT  10/4/2019

## 2019-10-08 ENCOUNTER — APPOINTMENT (OUTPATIENT)
Dept: PHYSICAL THERAPY | Facility: HOSPITAL | Age: 29
End: 2019-10-08

## 2019-10-08 ENCOUNTER — LAB (OUTPATIENT)
Dept: LAB | Facility: HOSPITAL | Age: 29
End: 2019-10-08

## 2019-10-08 ENCOUNTER — ROUTINE PRENATAL (OUTPATIENT)
Dept: OBSTETRICS AND GYNECOLOGY | Facility: CLINIC | Age: 29
End: 2019-10-08

## 2019-10-08 VITALS — BODY MASS INDEX: 43.93 KG/M2 | DIASTOLIC BLOOD PRESSURE: 82 MMHG | SYSTOLIC BLOOD PRESSURE: 138 MMHG | WEIGHT: 264 LBS

## 2019-10-08 DIAGNOSIS — Z3A.26 26 WEEKS GESTATION OF PREGNANCY: Primary | ICD-10-CM

## 2019-10-08 DIAGNOSIS — E06.3 HASHIMOTO'S THYROIDITIS: ICD-10-CM

## 2019-10-08 DIAGNOSIS — O10.912 MATERNAL CHRONIC HYPERTENSION IN SECOND TRIMESTER: ICD-10-CM

## 2019-10-08 DIAGNOSIS — Z3A.23 23 WEEKS GESTATION OF PREGNANCY: ICD-10-CM

## 2019-10-08 DIAGNOSIS — O34.211 ENCOUNTER FOR MATERNAL CARE FOR LOW TRANSVERSE SCAR FROM PREVIOUS CESAREAN DELIVERY: ICD-10-CM

## 2019-10-08 DIAGNOSIS — Z36.89 ENCOUNTER FOR OTHER SPECIFIED ANTENATAL SCREENING: ICD-10-CM

## 2019-10-08 DIAGNOSIS — E66.01 CLASS 3 SEVERE OBESITY WITH BODY MASS INDEX (BMI) OF 40.0 TO 44.9 IN ADULT, UNSPECIFIED OBESITY TYPE, UNSPECIFIED WHETHER SERIOUS COMORBIDITY PRESENT (HCC): ICD-10-CM

## 2019-10-08 DIAGNOSIS — O99.891 BACK PAIN AFFECTING PREGNANCY IN SECOND TRIMESTER: ICD-10-CM

## 2019-10-08 DIAGNOSIS — O09.299 HISTORY OF PRE-ECLAMPSIA IN PRIOR PREGNANCY, CURRENTLY PREGNANT: ICD-10-CM

## 2019-10-08 DIAGNOSIS — M54.9 BACK PAIN AFFECTING PREGNANCY IN SECOND TRIMESTER: ICD-10-CM

## 2019-10-08 LAB
DEPRECATED RDW RBC AUTO: 38.2 FL (ref 37–54)
ERYTHROCYTE [DISTWIDTH] IN BLOOD BY AUTOMATED COUNT: 13.9 % (ref 12.3–15.4)
GLUCOSE 1H P 100 G GLC PO SERPL-MCNC: 159 MG/DL (ref 60–140)
HCT VFR BLD AUTO: 29.7 % (ref 34–46.6)
HGB BLD-MCNC: 9.7 G/DL (ref 12–15.9)
MCH RBC QN AUTO: 25 PG (ref 26.6–33)
MCHC RBC AUTO-ENTMCNC: 32.7 G/DL (ref 31.5–35.7)
MCV RBC AUTO: 76.5 FL (ref 79–97)
PLATELET # BLD AUTO: 225 10*3/MM3 (ref 140–450)
PMV BLD AUTO: 10.6 FL (ref 6–12)
RBC # BLD AUTO: 3.88 10*6/MM3 (ref 3.77–5.28)
TSH SERPL DL<=0.05 MIU/L-ACNC: 0.82 UIU/ML (ref 0.27–4.2)
WBC NRBC COR # BLD: 8.86 10*3/MM3 (ref 3.4–10.8)

## 2019-10-08 PROCEDURE — 85027 COMPLETE CBC AUTOMATED: CPT

## 2019-10-08 PROCEDURE — 84443 ASSAY THYROID STIM HORMONE: CPT

## 2019-10-08 PROCEDURE — 82950 GLUCOSE TEST: CPT

## 2019-10-08 PROCEDURE — 36415 COLL VENOUS BLD VENIPUNCTURE: CPT

## 2019-10-08 PROCEDURE — 0502F SUBSEQUENT PRENATAL CARE: CPT | Performed by: NURSE PRACTITIONER

## 2019-10-08 NOTE — PROGRESS NOTES
CC: Prenatal visit    Jerrica Marquez is a 29 y.o.  at 26w5d.  Doing well.  She forgot to take her blood pressure medication this am.  No complaints.  Denies contractions, LOF, or VB.  Reports good FM.    /82   Wt 120 kg (264 lb)   LMP 2019 (Approximate)   BMI 43.93 kg/m²  Re-checked BP in left arm after pt sat for 10 minutes, 132/76    Fundal Height (cm): 28 cm       2nd pregnancy Problems (from 06/10/19 to present)     Problem Noted Resolved    Back pain affecting pregnancy in second trimester 2019 by Mahnaz Garay APRN No    Overview Signed 2019  4:04 PM by Mahnaz Garay APRN     Seeing Марина for PT         Encounter for maternal care for low transverse scar from previous  delivery 2019 by Mahnaz Garay APRN No    Overview Signed 2019  4:04 PM by Mahnaz Garay APRN     x1  Plans for repeat         History of pre-eclampsia in prior pregnancy, currently pregnant 2019 by Mahnaz Garay APRN No    Overview Addendum 2019  4:12 PM by Mahnaz Garay APRN     Taking daily ASA 81mg until 36 weeks  Baseline HTN labs done at [facility] on 19- all WNL         Maternal chronic hypertension in second trimester 2019 by Vazquez Davis DO No    Overview Signed 2019  9:49 AM by Vazquez Davis DO     Currently on labetalol 100 mg twice daily for chronic hypertension currently well controlled.  Patient also on baby aspirin 81 mg for history of preeclampsia in the last pregnancy.  Plan to get serial growth scans on this patient, and  testing starting at 32 weeks, would recommend delivery no later than 39 weeks.         Class 3 severe obesity with body mass index (BMI) of 40.0 to 44.9 in adult (CMS/HCC) 2018 by Beto Aquino APRN No    Hashimoto's thyroiditis 2016 by Beto Aquino APRN No    Overview Addendum 2019  4:07 PM by Mahnaz Garay APRN     Patient currently on 175 mcg of  Synthroid, being followed by endocrine for this problem.      TSH monthly per REBECA Salvador with TSH goal of < 2.5               A/P: Jerrica Marquez is a 29 y.o.  at 26w5d.  - RTC in 2 weeks for  appt or sooner if needed      Diagnosis Plan   1. 26 weeks gestation of pregnancy     2. Back pain affecting pregnancy in second trimester  Sees michael next week   3. Encounter for maternal care for low transverse scar from previous  delivery  Desires RCS   4. History of pre-eclampsia in prior pregnancy, currently pregnant  Continue labetalol as prescribed    5. Maternal chronic hypertension in second trimester     6. Class 3 severe obesity with body mass index (BMI) of 40.0 to 44.9 in adult, unspecified obesity type, unspecified whether serious comorbidity present (CMS/HCC)     7. Hashimoto's thyroiditis  TSH  wnl, continue synthyroid       REBECA Cabrera  10/8/2019  8:51 AM

## 2019-10-15 DIAGNOSIS — O99.019 IRON DEFICIENCY ANEMIA DURING PREGNANCY: Primary | ICD-10-CM

## 2019-10-15 DIAGNOSIS — D50.9 IRON DEFICIENCY ANEMIA DURING PREGNANCY: Primary | ICD-10-CM

## 2019-10-15 DIAGNOSIS — O99.810 ABNORMAL GLUCOSE TOLERANCE TEST (GTT) DURING PREGNANCY, ANTEPARTUM: ICD-10-CM

## 2019-10-17 ENCOUNTER — APPOINTMENT (OUTPATIENT)
Dept: PHYSICAL THERAPY | Facility: HOSPITAL | Age: 29
End: 2019-10-17

## 2019-10-18 ENCOUNTER — LAB (OUTPATIENT)
Dept: LAB | Facility: HOSPITAL | Age: 29
End: 2019-10-18

## 2019-10-18 DIAGNOSIS — O99.019 IRON DEFICIENCY ANEMIA DURING PREGNANCY: ICD-10-CM

## 2019-10-18 DIAGNOSIS — O99.810 ABNORMAL GLUCOSE TOLERANCE TEST (GTT) DURING PREGNANCY, ANTEPARTUM: ICD-10-CM

## 2019-10-18 DIAGNOSIS — D50.9 IRON DEFICIENCY ANEMIA DURING PREGNANCY: ICD-10-CM

## 2019-10-18 LAB
GLUCOSE 1H P 100 G GLC PO SERPL-MCNC: 170 MG/DL (ref 74–180)
GLUCOSE 2H P 100 G GLC PO SERPL-MCNC: 134 MG/DL (ref 74–155)
GLUCOSE 3H P 100 G GLC PO SERPL-MCNC: 122 MG/DL (ref 74–140)
GLUCOSE P FAST SERPL-MCNC: 91 MG/DL (ref 65–99)

## 2019-10-18 PROCEDURE — 36415 COLL VENOUS BLD VENIPUNCTURE: CPT

## 2019-10-18 PROCEDURE — 82951 GLUCOSE TOLERANCE TEST (GTT): CPT

## 2019-10-18 PROCEDURE — 82952 GTT-ADDED SAMPLES: CPT

## 2019-10-22 ENCOUNTER — ROUTINE PRENATAL (OUTPATIENT)
Dept: OBSTETRICS AND GYNECOLOGY | Facility: CLINIC | Age: 29
End: 2019-10-22

## 2019-10-22 VITALS — DIASTOLIC BLOOD PRESSURE: 75 MMHG | SYSTOLIC BLOOD PRESSURE: 122 MMHG | WEIGHT: 268 LBS | BODY MASS INDEX: 44.6 KG/M2

## 2019-10-22 DIAGNOSIS — O09.299 HISTORY OF PRE-ECLAMPSIA IN PRIOR PREGNANCY, CURRENTLY PREGNANT: ICD-10-CM

## 2019-10-22 DIAGNOSIS — O10.913 MATERNAL CHRONIC HYPERTENSION IN THIRD TRIMESTER: ICD-10-CM

## 2019-10-22 DIAGNOSIS — E66.01 CLASS 3 SEVERE OBESITY WITH BODY MASS INDEX (BMI) OF 40.0 TO 44.9 IN ADULT, UNSPECIFIED OBESITY TYPE, UNSPECIFIED WHETHER SERIOUS COMORBIDITY PRESENT (HCC): ICD-10-CM

## 2019-10-22 DIAGNOSIS — O34.211 ENCOUNTER FOR MATERNAL CARE FOR LOW TRANSVERSE SCAR FROM PREVIOUS CESAREAN DELIVERY: ICD-10-CM

## 2019-10-22 DIAGNOSIS — E06.3 HASHIMOTO'S THYROIDITIS: Primary | ICD-10-CM

## 2019-10-22 PROCEDURE — 0502F SUBSEQUENT PRENATAL CARE: CPT | Performed by: OBSTETRICS & GYNECOLOGY

## 2019-10-22 NOTE — PROGRESS NOTES
CC: Prenatal visit    Jerrica Marquez is a 29 y.o.  at 28w5d.  Doing well.  No complaints.  Denies contractions, LOF, or VB.  Reports good FM.  No complaints today.  Blood pressure is well controlled continues to take 100 mg labetalol twice daily for chronic hypertension.  Reviewed 3-hour glucose testing which was normal.  Patient to see endocrine in December.  Back pain improving with physical therapy.  Patient to get iron infusion this week.    /75   Wt 122 kg (268 lb)   LMP 2019 (Approximate)   BMI 44.60 kg/m²   Fetal heart rate: 140 bpm  Fundal height 29 cm          2nd pregnancy Problems (from 06/10/19 to present)     Problem Noted Resolved    Back pain affecting pregnancy in third trimester 2019 by Mahnaz Garay APRN No    Overview Signed 2019  4:04 PM by Mahnaz Garay APRN     Seeing Амрина for PT         Encounter for maternal care for low transverse scar from previous  delivery 2019 by Mahnaz Garay APRN No    Overview Signed 2019  4:04 PM by Mahnaz Garay APRN     x1  Plans for repeat         History of pre-eclampsia in prior pregnancy, currently pregnant 2019 by Mahnaz Garay APRN No    Overview Addendum 2019  4:12 PM by Mahnaz Garay APRN     Taking daily ASA 81mg until 36 weeks  Baseline HTN labs done at [facility] on 19- all WNL         Maternal chronic hypertension in third trimester 2019 by Vazquez Davis DO No    Overview Signed 2019  9:49 AM by Vazquez Davis DO     Currently on labetalol 100 mg twice daily for chronic hypertension currently well controlled.  Patient also on baby aspirin 81 mg for history of preeclampsia in the last pregnancy.  Plan to get serial growth scans on this patient, and  testing starting at 32 weeks, would recommend delivery no later than 39 weeks.         Class 3 severe obesity with body mass index (BMI) of 40.0 to 44.9 in adult (CMS/[facility]) 2018 by Miles  REBECA Galloway No    Hashimoto's thyroiditis 2016 by Beto Aquino APRN No    Overview Addendum 2019  4:07 PM by Mahnaz Garay APRN     Patient currently on 175 mcg of Synthroid, being followed by endocrine for this problem.      TSH monthly per REBECA Salvador with TSH goal of < 2.5               A/P: Jerrica Marquez is a 29 y.o.  at 28w5d.  Doing well with appropriately progressing pregnancy at this time.  Complicated by chronic hypertension well controlled currently.  Also complicated by thyroid disease also currently well controlled.  Plan to have patient return to see me in 2 weeks.  Repeat growth scan him 1 to 2 weeks.  Fetal kick count  labor precautions given.  Patient to return sooner as needed.  - RTC in 2 weeks     Diagnosis Plan   1. Hashimoto's thyroiditis     2. Class 3 severe obesity with body mass index (BMI) of 40.0 to 44.9 in adult, unspecified obesity type, unspecified whether serious comorbidity present (CMS/HCC)     3. Maternal chronic hypertension in third trimester     4. Encounter for maternal care for low transverse scar from previous  delivery     5. History of pre-eclampsia in prior pregnancy, currently pregnant       Vazquez Davis DO  10/22/2019  9:34 AM

## 2019-10-25 ENCOUNTER — LAB (OUTPATIENT)
Dept: ONCOLOGY | Facility: HOSPITAL | Age: 29
End: 2019-10-25

## 2019-10-25 ENCOUNTER — CONSULT (OUTPATIENT)
Dept: ONCOLOGY | Facility: CLINIC | Age: 29
End: 2019-10-25

## 2019-10-25 VITALS
HEART RATE: 80 BPM | DIASTOLIC BLOOD PRESSURE: 57 MMHG | HEIGHT: 65 IN | SYSTOLIC BLOOD PRESSURE: 123 MMHG | WEIGHT: 267.6 LBS | TEMPERATURE: 97.4 F | BODY MASS INDEX: 44.58 KG/M2 | RESPIRATION RATE: 18 BRPM

## 2019-10-25 DIAGNOSIS — D50.8 OTHER IRON DEFICIENCY ANEMIA: ICD-10-CM

## 2019-10-25 DIAGNOSIS — O99.012 ANEMIA DURING PREGNANCY IN SECOND TRIMESTER: Primary | ICD-10-CM

## 2019-10-25 DIAGNOSIS — E53.8 VITAMIN B12 DEFICIENCY: ICD-10-CM

## 2019-10-25 DIAGNOSIS — O99.012 ANEMIA DURING PREGNANCY IN SECOND TRIMESTER: ICD-10-CM

## 2019-10-25 PROBLEM — D50.9 IRON DEFICIENCY ANEMIA: Status: ACTIVE | Noted: 2019-10-25

## 2019-10-25 LAB
BASOPHILS # BLD AUTO: 0.02 10*3/MM3 (ref 0–0.2)
BASOPHILS NFR BLD AUTO: 0.2 % (ref 0–1.5)
DEPRECATED RDW RBC AUTO: 42.3 FL (ref 37–54)
EOSINOPHIL # BLD AUTO: 0.03 10*3/MM3 (ref 0–0.4)
EOSINOPHIL NFR BLD AUTO: 0.3 % (ref 0.3–6.2)
ERYTHROCYTE [DISTWIDTH] IN BLOOD BY AUTOMATED COUNT: 15.2 % (ref 12.3–15.4)
FERRITIN SERPL-MCNC: 5.44 NG/ML (ref 13–150)
FOLATE SERPL-MCNC: 17.8 NG/ML (ref 4.78–24.2)
HCT VFR BLD AUTO: 30.9 % (ref 34–46.6)
HGB BLD-MCNC: 9.9 G/DL (ref 12–15.9)
IMM GRANULOCYTES # BLD AUTO: 0.09 10*3/MM3 (ref 0–0.05)
IMM GRANULOCYTES NFR BLD AUTO: 0.9 % (ref 0–0.5)
IRON 24H UR-MRATE: 40 MCG/DL (ref 37–145)
IRON SATN MFR SERPL: 7 % (ref 20–50)
LYMPHOCYTES # BLD AUTO: 1.5 10*3/MM3 (ref 0.7–3.1)
LYMPHOCYTES NFR BLD AUTO: 14.7 % (ref 19.6–45.3)
MCH RBC QN AUTO: 24.9 PG (ref 26.6–33)
MCHC RBC AUTO-ENTMCNC: 32 G/DL (ref 31.5–35.7)
MCV RBC AUTO: 77.8 FL (ref 79–97)
MONOCYTES # BLD AUTO: 0.51 10*3/MM3 (ref 0.1–0.9)
MONOCYTES NFR BLD AUTO: 5 % (ref 5–12)
NEUTROPHILS # BLD AUTO: 8.08 10*3/MM3 (ref 1.7–7)
NEUTROPHILS NFR BLD AUTO: 78.9 % (ref 42.7–76)
NRBC BLD AUTO-RTO: 0 /100 WBC (ref 0–0.2)
PHOSPHATE SERPL-MCNC: 3.3 MG/DL (ref 2.5–4.5)
PLATELET # BLD AUTO: 217 10*3/MM3 (ref 140–450)
PMV BLD AUTO: 10 FL (ref 6–12)
RBC # BLD AUTO: 3.97 10*6/MM3 (ref 3.77–5.28)
TIBC SERPL-MCNC: 563 MCG/DL (ref 298–536)
TRANSFERRIN SERPL-MCNC: 378 MG/DL (ref 200–360)
VIT B12 BLD-MCNC: 238 PG/ML (ref 211–946)
WBC NRBC COR # BLD: 10.23 10*3/MM3 (ref 3.4–10.8)

## 2019-10-25 PROCEDURE — G0463 HOSPITAL OUTPT CLINIC VISIT: HCPCS | Performed by: INTERNAL MEDICINE

## 2019-10-25 PROCEDURE — 84466 ASSAY OF TRANSFERRIN: CPT

## 2019-10-25 PROCEDURE — 85025 COMPLETE CBC W/AUTO DIFF WBC: CPT

## 2019-10-25 PROCEDURE — 83540 ASSAY OF IRON: CPT

## 2019-10-25 PROCEDURE — 82728 ASSAY OF FERRITIN: CPT

## 2019-10-25 PROCEDURE — 84100 ASSAY OF PHOSPHORUS: CPT

## 2019-10-25 PROCEDURE — 82607 VITAMIN B-12: CPT

## 2019-10-25 PROCEDURE — 82746 ASSAY OF FOLIC ACID SERUM: CPT

## 2019-10-25 PROCEDURE — 99204 OFFICE O/P NEW MOD 45 MIN: CPT | Performed by: INTERNAL MEDICINE

## 2019-10-25 RX ORDER — DIPHENHYDRAMINE HYDROCHLORIDE 50 MG/ML
50 INJECTION INTRAMUSCULAR; INTRAVENOUS AS NEEDED
Status: CANCELLED | OUTPATIENT
Start: 2019-10-31

## 2019-10-25 RX ORDER — SODIUM CHLORIDE 9 MG/ML
250 INJECTION, SOLUTION INTRAVENOUS ONCE
Status: CANCELLED | OUTPATIENT
Start: 2019-10-31 | End: 2019-10-31

## 2019-10-25 NOTE — PATIENT INSTRUCTIONS
Ferric carboxymaltose injection  What is this medicine?  FERRIC CARBOXYMALTOSE (ferr-ik car-box-ee-mol-toes) is an iron complex. Iron is used to make healthy red blood cells, which carry oxygen and nutrients throughout the body. This medicine is used to treat anemia in people with chronic kidney disease or people who cannot take iron by mouth.  This medicine may be used for other purposes; ask your health care provider or pharmacist if you have questions.  COMMON BRAND NAME(S): Injectafer  What should I tell my health care provider before I take this medicine?  They need to know if you have any of these conditions:  -high levels of iron in the blood  -liver disease  -an unusual or allergic reaction to iron, other medicines, foods, dyes, or preservatives  -pregnant or trying to get pregnant  -breast-feeding  How should I use this medicine?  This medicine is for infusion into a vein. It is given by a health care professional in a hospital or clinic setting.  Talk to your pediatrician regarding the use of this medicine in children. Special care may be needed.  Overdosage: If you think you have taken too much of this medicine contact a poison control center or emergency room at once.  NOTE: This medicine is only for you. Do not share this medicine with others.  What if I miss a dose?  It is important not to miss your dose. Call your doctor or health care professional if you are unable to keep an appointment.  What may interact with this medicine?  Do not take this medicine with any of the following medications:  -deferoxamine  -dimercaprol  -other iron products  This list may not describe all possible interactions. Give your health care provider a list of all the medicines, herbs, non-prescription drugs, or dietary supplements you use. Also tell them if you smoke, drink alcohol, or use illegal drugs. Some items may interact with your medicine.  What should I watch for while using this medicine?  Visit your doctor or  health care professional regularly. Tell your doctor if your symptoms do not start to get better or if they get worse. You may need blood work done while you are taking this medicine.  You may need to follow a special diet. Talk to your doctor. Foods that contain iron include: whole grains/cereals, dried fruits, beans, or peas, leafy green vegetables, and organ meats (liver, kidney).  What side effects may I notice from receiving this medicine?  Side effects that you should report to your doctor or health care professional as soon as possible:  -allergic reactions like skin rash, itching or hives, swelling of the face, lips, or tongue  -dizziness  -facial flushing  Side effects that usually do not require medical attention (report to your doctor or health care professional if they continue or are bothersome):  -changes in taste  -constipation  -headache  -nausea, vomiting  -pain, redness, or irritation at site where injected  This list may not describe all possible side effects. Call your doctor for medical advice about side effects. You may report side effects to FDA at 7-407-FDA-9888.  Where should I keep my medicine?  This drug is given in a hospital or clinic and will not be stored at home.  NOTE: This sheet is a summary. It may not cover all possible information. If you have questions about this medicine, talk to your doctor, pharmacist, or health care provider.  © 2019 [employer] (2018-02-01 09:40:29)

## 2019-10-27 NOTE — PROGRESS NOTES
Jerrica Marquez  [id number]  1990      REASON FOR CONSULTATION:  Anemia in pregnancy   Provide an opinion on any further workup or treatment                             REQUESTING PHYSICIAN:  Beto Aquino    RECORDS OBTAINED:  Records of the patients history including those obtained from the referring provider were reviewed and summarized in detail.      History of Present Illness     This is a pleasant 29 year old female who was seen in consultation at the request of Beto Aquino for evaluation of anemia of pregnancy. Patient had routine pre-nisha labs performed on 10/8/19, which showed anemia with Hg of 9.7 with microcytosis. Her hg was considerably worse compared to prior 11.4 from 19.      Patient is currently taking pre- vitamins with iron with persistent anemia.   She does report history of anemia in first pregnancy, but responded with pre- vitamins.   Denies any prior IV iron infusions or blood transfusions.   No family history of hematological disorder.   Denies any obvious blood loss.     I have been asked to assist with evaluation and management of her anemia.    Past Medical History:   Diagnosis Date   • Abdominal pain     generalized cramping after eating wheat type products   • Acquired hypothyroidism     Secondary to Hashimoto's thyroiditis   • Back pain affecting pregnancy in second trimester    • Dysmenorrhea    • Endogenous obesity    • Excessive or frequent menstruation    • Hashimoto's thyroiditis    • Headache    • Hypertension 2017    gestational hypertension   • Myopia    • Vitamin D deficiency         Past Surgical History:   Procedure Laterality Date   •  SECTION N/A 2017    Procedure:  SECTION PRIMARY;  Surgeon: Jayne Thibodeaux MD;  Location: [facility] LABOR DELIVERY;  Service:    • INJECTION OF MEDICATION  2013    Depo Provera (medroxyprogesterone acetate)   • INJECTION OF MEDICATION  2012    Kenalog x2   • INJECTION OF  MEDICATION  09/17/2015    Toradol   • INJECTION OF MEDICATION  09/17/2015    Zofran   • WISDOM TOOTH EXTRACTION          Current Outpatient Medications on File Prior to Visit   Medication Sig Dispense Refill   • aspirin 81 MG chewable tablet Chew 81 mg Daily.     • labetalol (NORMODYNE) 100 MG tablet Take 1 tablet by mouth 2 (Two) Times a Day. 60 tablet 6   • levothyroxine (SYNTHROID, LEVOTHROID) 175 MCG tablet Take 1 tablet by mouth Daily. 30 tablet 11   • Prenatal Vit-Fe Fumarate-FA (PRENATAL VITAMIN) 27-0.8 MG tablet Take 1 tablet by mouth Daily.       No current facility-administered medications on file prior to visit.         ALLERGIES:    Allergies   Allergen Reactions   • Cefzil [Cefprozil] Hives   • Celexa [Citalopram]      Patient states that she is not allergic to this med. It causes fatigue   • Eggs Or Egg-Derived Products Diarrhea   • Influenza Vaccines      Migraines/ vomiting has reaction 2015- not in past         Social History     Socioeconomic History   • Marital status:      Spouse name: Not on file   • Number of children: Not on file   • Years of education: Not on file   • Highest education level: Not on file   Tobacco Use   • Smoking status: Never Smoker   • Smokeless tobacco: Never Used   Substance and Sexual Activity   • Alcohol use: No   • Drug use: No   • Sexual activity: Yes     Partners: Male        Family History   Problem Relation Age of Onset   • Asthma Other    • Hypertension Other    • Other Other         Thyroid Disorder, Depressive Disorder, Smoking Tobacco, Anxiety   • Rheum arthritis Mother    • Hypertension Father    • Heart disease Maternal Grandfather    • Hypertension Maternal Grandfather    • Osteoporosis Paternal Grandmother    • Stroke Paternal Grandmother    • Anemia Paternal Grandmother    • Pulmonary embolism Maternal Grandmother         Review of Systems       CONSTITUTIONAL: fatigue + weakness +  No weight loss, fever, chills.  HEENT: Eyes: No visual loss,  "blurred vision, double vision or yellow sclerae. Ears, Nose, Throat: No hearing loss, sneezing, congestion, runny nose or sore throat.  SKIN: No rash or itching.  CARDIOVASCULAR: No chest pain, chest pressure or chest discomfort. No palpitations or edema.  RESPIRATORY: No shortness of breath, cough or sputum.  GASTROINTESTINAL: No anorexia, nausea, vomiting or diarrhea. No abdominal pain or blood.  GENITOURINARY: Negative for urgency, frequency or dysuria.   NEUROLOGICAL: No headache, dizziness, syncope, paralysis, ataxia, numbness or tingling in the extremities. No change in bowel or bladder control.  MUSCULOSKELETAL: No muscle, back pain, joint pain or stiffness.  HEMATOLOGIC: No anemia, bleeding or bruising.  LYMPHATICS: No enlarged nodes. No history of splenectomy.  PSYCHIATRIC: No history of depression or anxiety.  ENDOCRINOLOGIC: No reports of sweating, cold or heat intolerance. No polyuria or polydipsia.  ALLERGIES: No history of asthma, hives, eczema or rhinitis.      Objective     Vitals:    10/25/19 1256   BP: 123/57   Pulse: 80   Resp: 18   Temp: 97.4 °F (36.3 °C)   Weight: 121 kg (267 lb 9.6 oz)   Height: 165.1 cm (65\")   PainSc: 0-No pain     Current Status 10/25/2019   ECOG score 1       Physical Exam      GENERAL: Alert, awake, oriented.  Well dressed.  Not in apparent distress. Vitals as above.   HEAD: Normocephalic, atraumatic.   EYES: PERRL, EOMI.  vision is grossly intact.conjunctival pallor +   NECK: Supple, no adenopathy or thyromegaly.   THROAT: Normal oral cavity and pharynx. No inflammation, swelling, exudate, or lesions.  CARDIAC: Normal S1 and S2. No S3, S4 or murmurs. Rhythm is regular.  Extremities are warm and well perfused.   LUNGS: Clear to auscultation and percussion without rales, rhonchi, wheezing or diminished breath sounds.  ABDOMEN: Positive bowel sounds. Soft, pregnant + , nontender. No guarding or rebound. No masses.  BACK:  No bony tenderness.   EXTREMITIES: No significant " deformity or joint abnormality.  Peripheral pulses intact. No varicosities.  SKIN: No rash or bruising.  NEUROLOGICAL: Grossly non-focal exam. No focal weakness. Gait: Normal.   PSYCH: Mood and affect normal. No hallucination or suicidal thoughts.   LYMPHATIC: No cervical, supraclavicular or axillary adenopathy.       RECENT LABS:Independently reviewed and summarized.  Hematology WBC   Date Value Ref Range Status   10/25/2019 10.23 3.40 - 10.80 10*3/mm3 Final     RBC   Date Value Ref Range Status   10/25/2019 3.97 3.77 - 5.28 10*6/mm3 Final     Hemoglobin   Date Value Ref Range Status   10/25/2019 9.9 (L) 12.0 - 15.9 g/dL Final     Hematocrit   Date Value Ref Range Status   10/25/2019 30.9 (L) 34.0 - 46.6 % Final     Platelets   Date Value Ref Range Status   10/25/2019 217 140 - 450 10*3/mm3 Final          I have reviewed old records and summarized them in HPI as well as assessment and plan section of this note.     Diagnosis:   (1) Anemia during pregnancy   (2) Iron deficiency anemia   (3) B12 deficiency     All are new diagnosis/problems for me.     Assessment/Plan     (1) Anemia during pregnancy (2) Iron deficiency anemia (3) B12 deficiency     Patient with anemia in second trimester of pregnancy.   We check iron iron studies, b12 and folate.   Her iron level is very low - ferritin of 5, iron saturation of 7.   B12 level is low as well - 238.     Discussed diagnosis and treatment of iron deficiency and b12 deficiency anemia at length.     She is taking pre- vitamins including iron and b12 already.    I believe she can benefit from IV iron.     I had an extensive discussion with patient about diagnosis and treatment options. I recommend that we replaced her iron with IV injectofer 750 mg, 2 infusions, one week apart.    I had an extensive discussion with her about risk versus benefits of IV iron treatment.    I discussed about various risks associated with IV iron such as allergic reaction, hypersensitivity  reaction, headache, flushing, joint aches or pains, local IV infiltration and skin discoloration.  After our discussion patient was in agreement and proceeding with IV iron treatment for  anemia.    In addition, I also recommend daily b12 injection x 5 days followed by weekly b12 injections x 9 weeks - through out the duration of pregnancy.     Thank you for involving me in Ms Marquez's care.     Please call us if any questions/concerns.     Gigi Rodriguez MD   Hematology Oncology

## 2019-10-28 ENCOUNTER — TELEPHONE (OUTPATIENT)
Dept: ONCOLOGY | Facility: HOSPITAL | Age: 29
End: 2019-10-28

## 2019-10-28 NOTE — TELEPHONE ENCOUNTER
Informed patient of information. She stated that she is familiar with this and has given injections in the past. She verbalized understanding.

## 2019-10-28 NOTE — TELEPHONE ENCOUNTER
----- Message from Ayde Rodriguez MD sent at 10/27/2019  2:23 PM CDT -----  Iron and b12 is low.   Continue with IV iron as discussed.   She will need b12 injection - daily for 5 days , then weekly x 8 for the duration of pregnancy. Would she like to take the shots by herself or come to clinic?

## 2019-10-29 ENCOUNTER — HOSPITAL ENCOUNTER (OUTPATIENT)
Dept: PHYSICAL THERAPY | Facility: HOSPITAL | Age: 29
Setting detail: THERAPIES SERIES
Discharge: HOME | End: 2019-10-29

## 2019-10-29 DIAGNOSIS — M54.9 BACK PAIN AFFECTING PREGNANCY IN SECOND TRIMESTER: Primary | ICD-10-CM

## 2019-10-29 DIAGNOSIS — O99.891 BACK PAIN AFFECTING PREGNANCY IN SECOND TRIMESTER: Primary | ICD-10-CM

## 2019-10-29 PROCEDURE — 97535 SELF CARE MNGMENT TRAINING: CPT | Performed by: PHYSICAL THERAPIST

## 2019-10-29 NOTE — THERAPY DISCHARGE NOTE
Outpatient Physical Therapy Pelvic Health Progress Note/Discharge Summary  [facility]     Patient Name: Jerrica Marquez  : 1990  MRN: [id number]  Today's Date: 10/29/2019        Visit Date: 10/29/2019  Visit Number:   Recheck: 19  Insurance: [employer]  Improvement: 100%    Visit Dx:    ICD-10-CM ICD-9-CM   1. Back pain affecting pregnancy in second trimester O99.89 646.83    M54.9 724.5       Patient Active Problem List   Diagnosis   • Hashimoto's thyroiditis   • Vitamin D deficiency   • Class 3 severe obesity with body mass index (BMI) of 40.0 to 44.9 in adult (CMS/[facility])   • Maternal chronic hypertension in third trimester   • Back pain affecting pregnancy in third trimester   • Obesity affecting pregnancy in second trimester   • Encounter for maternal care for low transverse scar from previous  delivery   • History of pre-eclampsia in prior pregnancy, currently pregnant   • Anemia during pregnancy in second trimester   • Iron deficiency anemia        Pelvic Health     Row Name 10/29/19 1500             Pregnancy Questions    Number of Pregnancies  2  -SW      Number of Miscarriages  0  -SW      Has the patient had an ?  No  -SW      Number of Children  1  -SW      How many weeks pregnant are you?  30 weeks  -SW      Type of Previous Deliveries    -SW      Due Date  20  -SW        User Key  (r) = Recorded By, (t) = Taken By, (c) = Cosigned By    Initials Name Provider Type    Марина Fox, PT Physical Therapist        PT Ortho     Row Name 10/29/19 1500       Subjective Comments    Subjective Comments  Been doing good.  No real issues.  No back and hip issues.  Belly is getting a lot tighter but that is suppose to happen.  As long as she does her exercises she is good.  Exercises has relieved the pain in hip and back.  She has had a hard time with  and getting to her appts.  This was reason she cancelled last visit.   Patient believes she can manage on her own at this time.  Begin iron infusions soon.  Comments that PT was right about low iron causing cramps.  States that her iron levels were very low.    -SW       Subjective Pain    Able to rate subjective pain?  yes  -SW    Pre-Treatment Pain Level  0  -SW    Post-Treatment Pain Level  0  -SW       Posture/Observations    Posture- WNL  Posture is WNL  -SW    Posture/Observations Comments  alert and oriented x 3.  Good spirits noted.  No distress in appearance.  Able to verbalize HEP as assigned.   -SW       Neural Tension Signs- Lower Quarter Clearing    Slump  Negative  -SW    SLR  Negative  -SW       Sensory Screen for Light Touch- Lower Quarter Clearing    L1 (inguinal area)  Intact  -SW    L2 (anterior mid thigh)  Intact  -SW    L3 (distal anterior thigh)  Intact  -SW    L4 (medial lower leg/foot)  Intact  -SW    L5 (lateral lower leg/great toe)  Intact  -SW    S1 (bottom of foot)  Intact  -SW       Myotomal Screen- Lower Quarter Clearing    Hip flexion (L2)  5 (Normal)  -SW    Knee extension (L3)  5 (Normal)  -SW    Ankle DF (L4)  WNL  -SW    Great toe extension (L5)  WNL  -SW    Ankle PF (S1)  WNL  -SW    Knee flexion (S2)  5 (Normal)  -SW       Lumbar ROM Screen- Lower Quarter Clearing    Lumbar Flexion  Normal  -SW    Lumbar Extension  Normal  -SW    Lumbar Lateral Flexion  Normal  -SW    Lumbar Rotation  Normal  -SW       SI/Hip Screen- Lower Quarter Clearing    ASIS compression  Negative  -SW    ASIS distraction  Negative  -SW    Rhonda's/Roque's test  Negative  -SW    Pain in Mohsen's area  Negative  -SW       Special Tests/Palpation    Special Tests/Palpation  -- ASLR R 2, L0 = 2  -SW       Lumbosacral Accessory Motions    Lumbosacral Accessory Motions Tested?  Yes  -SW    PA glide- Sacral base  -- aligned and symmetrical  -SW    Innominate rotation  -- aligned and symmetrical   -SW       Lumbar/SI Special Tests    SI Compression Test (SI Dysfunction)  Negative   -    SI Distraction Test (SI Dysfunction)  Negative  -    SANDRA (hip vs. SI Dysfunction)  Negative  -    Lumbar/SI Special Tests Comments  Improved provacative tests this date.  Though mild tension noted through piriformis, no reaction with test.   -       Lumbosacral Palpation    Lumbosacral Palpation?  No Tenderness/Abnormality  -       General ROM    GENERAL ROM COMMENTS  full AROM all planes without inc pain or complaints.   -       MMT (Manual Muscle Testing)    General MMT Comments  Grossly 4+/5 throughout.   -       Transfers    Comment (Transfers)  indpendent with continued use of log roll supine to sit.   -       Gait/Stairs Assessment/Training    Comment (Gait/Stairs)  Non-antalgic, no toe drag, normal step and stride noted bilaterally.   -      User Key  (r) = Recorded By, (t) = Taken By, (c) = Cosigned By    Initials Name Provider Type    Марина Fox, PT Physical Therapist                     PT Assessment/Plan     Row Name 10/29/19 1500          PT Assessment    Assessment Comments  Patient is showing reduced limitation with improved function overall.  She verbalizes understanding of Home managment program and wishes to transition into that phase now.  She is compliant with HEP and understands long term compliance and importance.  All goals met with compliance understood for long term managment.   -     Rehab Potential  Good  -     Patient/caregiver participated in establishment of treatment plan and goals  Yes  -     Patient would benefit from skilled therapy intervention  Yes  -        PT Plan    PT Frequency  -- DC this date.  -     PT Plan Comments  DC this date from formal PT and progress toward home managment.  Consult at later time should pain return or a need necessitate based on MD recommendations.   -       User Key  (r) = Recorded By, (t) = Taken By, (c) = Cosigned By    Initials Name Provider Type    Марина Fox, PT Physical Therapist               OP Exercises     Row Name 10/29/19 1500             Subjective Comments    Subjective Comments  Been doing good.  No real issues.  No back and hip issues.  Belly is getting a lot tighter but that is suppose to happen.  As long as she does her exercises she is good.  Exercises has relieved the pain in hip and back.  She has had a hard time with  and getting to her appts.  This was reason she cancelled last visit.  Patient believes she can manage on her own at this time.  Begin iron infusions soon.  Comments that PT was right about low iron causing cramps.  States that her iron levels were very low.    -         Subjective Pain    Able to rate subjective pain?  yes  -      Pre-Treatment Pain Level  0  -SW      Post-Treatment Pain Level  0  -         Exercise 1    Exercise Name 1  Re-evaluation/DC planning.  -      Additional Comments  review of formalized PT HEP.   -         Exercise 2    Exercise Name 2  discussed PF health  -      Additional Comments  introduced PF kegels exercises with patient and importance thereof.  endurance x 10 sec x 40 contractions per day.  QF post void x 5.   -        User Key  (r) = Recorded By, (t) = Taken By, (c) = Cosigned By    Initials Name Provider Type    Марина Fox, PT Physical Therapist                      PT OP Goals     Row Name 10/29/19 1500          PT Short Term Goals    STG Date to Achieve  10/03/19  -     STG 1  independent with HEP for stretching and stabilization  -     STG 1 Progress  Met  -     STG 2  complete supine to sit t/f with spinal neutral mechanics without cues.   -     STG 2 Progress  Met  -     STG 3  patient to show inc core stability with ASLR at 3 or less.   -     STG 3 Progress  Met  -     STG 4  subjectively report 50 % improvement in pain  -     STG 4 Progress  Met  -        Long Term Goals    LTG Date to Achieve  12/05/19  -     LTG 1  subjective improvement 60-70%  -     LTG 1  Progress  Met  -SW     LTG 2  improved muscle tone to LE to 4/5 grossly or stronger to allow improved stability to pelvic ring, such to dec WB pain with gait.   -     LTG 2 Progress  Met  -SW     LTG 3  reduced muscle triggers to piriformis and lumbar spine to allow palpation without inc pain.   -     LTG 3 Progress  Met  -SW        Time Calculation    PT Goal Re-Cert Due Date  -- DC this date.   -       User Key  (r) = Recorded By, (t) = Taken By, (c) = Cosigned By    Initials Name Provider Type    Марина Fox, PT Physical Therapist           Therapy Education  Given: HEP, Symptoms/condition management  Program: Reinforced  How Provided: Verbal, Demonstration  Provided to: Patient  Level of Understanding: Teach back education performed, Verbalized, Demonstrated            Time Calculation:   Start Time: 1508  Stop Time: 1550  Time Calculation (min): 42 min    Therapy Charges for Today     Code Description Service Date Service Provider Modifiers Qty    36752144384 HC PT SELF CARE/MGMT/TRAIN EA 15 MIN 10/29/2019 Марина Nash, PT GP 3               OP PT Discharge Summary  Date of Discharge: 10/29/19  Reason for Discharge: All goals achieved  Outcomes Achieved: Able to achieve all goals within established timeline  Discharge Destination: Home with home program  Discharge Instructions/Additional Comments: continue with HEp as assigned.       Марина Nash, EMANUEL  10/29/2019

## 2019-10-31 ENCOUNTER — INFUSION (OUTPATIENT)
Dept: PEDIATRICS | Facility: HOSPITAL | Age: 29
End: 2019-10-31

## 2019-10-31 VITALS
OXYGEN SATURATION: 96 % | DIASTOLIC BLOOD PRESSURE: 63 MMHG | TEMPERATURE: 97 F | HEART RATE: 92 BPM | RESPIRATION RATE: 20 BRPM | SYSTOLIC BLOOD PRESSURE: 136 MMHG

## 2019-10-31 DIAGNOSIS — D50.8 OTHER IRON DEFICIENCY ANEMIA: ICD-10-CM

## 2019-10-31 DIAGNOSIS — O99.012 ANEMIA DURING PREGNANCY IN SECOND TRIMESTER: Primary | ICD-10-CM

## 2019-10-31 PROCEDURE — 96365 THER/PROPH/DIAG IV INF INIT: CPT

## 2019-10-31 PROCEDURE — 25010000002 FERRIC CARBOXYMALTOSE 750 MG/15ML SOLUTION 15 ML VIAL: Performed by: INTERNAL MEDICINE

## 2019-10-31 RX ORDER — METHYLPREDNISOLONE SODIUM SUCCINATE 125 MG/2ML
125 INJECTION, POWDER, LYOPHILIZED, FOR SOLUTION INTRAMUSCULAR; INTRAVENOUS AS NEEDED
Status: CANCELLED | OUTPATIENT
Start: 2019-10-31

## 2019-10-31 RX ORDER — METHYLPREDNISOLONE SODIUM SUCCINATE 125 MG/2ML
125 INJECTION, POWDER, LYOPHILIZED, FOR SOLUTION INTRAMUSCULAR; INTRAVENOUS AS NEEDED
Status: DISCONTINUED | OUTPATIENT
Start: 2019-10-31 | End: 2019-10-31 | Stop reason: HOSPADM

## 2019-10-31 RX ORDER — SODIUM CHLORIDE 9 MG/ML
250 INJECTION, SOLUTION INTRAVENOUS ONCE
Status: COMPLETED | OUTPATIENT
Start: 2019-10-31 | End: 2019-10-31

## 2019-10-31 RX ORDER — SODIUM CHLORIDE 9 MG/ML
250 INJECTION, SOLUTION INTRAVENOUS ONCE
Status: CANCELLED | OUTPATIENT
Start: 2019-10-31 | End: 2019-10-31

## 2019-10-31 RX ORDER — DIPHENHYDRAMINE HYDROCHLORIDE 50 MG/ML
50 INJECTION INTRAMUSCULAR; INTRAVENOUS AS NEEDED
Status: CANCELLED | OUTPATIENT
Start: 2019-10-31

## 2019-10-31 RX ORDER — DIPHENHYDRAMINE HYDROCHLORIDE 50 MG/ML
50 INJECTION INTRAMUSCULAR; INTRAVENOUS AS NEEDED
Status: DISCONTINUED | OUTPATIENT
Start: 2019-10-31 | End: 2019-10-31 | Stop reason: HOSPADM

## 2019-10-31 RX ADMIN — FERRIC CARBOXYMALTOSE INJECTION 750 MG: 50 INJECTION, SOLUTION INTRAVENOUS at 08:27

## 2019-10-31 RX ADMIN — SODIUM CHLORIDE 250 ML: 9 INJECTION, SOLUTION INTRAVENOUS at 08:26

## 2019-11-03 NOTE — TELEPHONE ENCOUNTER
----- Message from REBECA Blancas sent at 1/22/2018  3:53 PM CST -----  Stop the extra pill a week; labs in 4 weeks  ----- Message -----     From: Tiffanie Desir MA     Sent: 1/22/2018   3:24 PM       To: REBECA Blancas    YES SHE HAS DELIVERED.  TAKES 125 MCG AND TAKES AN EXTRA PILL A WEEK WHILE PREGNANT AND TOLD TO KEEP DOING THAT TILL WE GOT THE RESULTS.   ----- Message -----     From: REBECA Blancas     Sent: 1/22/2018   1:32 PM       To: Tiffanie Desir MA    Thyroid is suppressed; has she delivered? We need to back off the medication? What dosage and how is she taking her levothyroxine?      
Negative

## 2019-11-06 ENCOUNTER — ROUTINE PRENATAL (OUTPATIENT)
Dept: OBSTETRICS AND GYNECOLOGY | Facility: CLINIC | Age: 29
End: 2019-11-06

## 2019-11-06 VITALS — WEIGHT: 267 LBS | SYSTOLIC BLOOD PRESSURE: 119 MMHG | DIASTOLIC BLOOD PRESSURE: 75 MMHG | BODY MASS INDEX: 44.43 KG/M2

## 2019-11-06 DIAGNOSIS — O99.891 BACK PAIN AFFECTING PREGNANCY IN THIRD TRIMESTER: ICD-10-CM

## 2019-11-06 DIAGNOSIS — E66.01 CLASS 3 SEVERE OBESITY WITH BODY MASS INDEX (BMI) OF 40.0 TO 44.9 IN ADULT, UNSPECIFIED OBESITY TYPE, UNSPECIFIED WHETHER SERIOUS COMORBIDITY PRESENT (HCC): ICD-10-CM

## 2019-11-06 DIAGNOSIS — O09.299 HISTORY OF PRE-ECLAMPSIA IN PRIOR PREGNANCY, CURRENTLY PREGNANT: ICD-10-CM

## 2019-11-06 DIAGNOSIS — Z3A.30 30 WEEKS GESTATION OF PREGNANCY: ICD-10-CM

## 2019-11-06 DIAGNOSIS — O10.913 MATERNAL CHRONIC HYPERTENSION IN THIRD TRIMESTER: ICD-10-CM

## 2019-11-06 DIAGNOSIS — E06.3 HASHIMOTO'S THYROIDITIS: Primary | ICD-10-CM

## 2019-11-06 DIAGNOSIS — O99.012 ANEMIA DURING PREGNANCY IN SECOND TRIMESTER: ICD-10-CM

## 2019-11-06 DIAGNOSIS — M54.9 BACK PAIN AFFECTING PREGNANCY IN THIRD TRIMESTER: ICD-10-CM

## 2019-11-06 DIAGNOSIS — O34.211 ENCOUNTER FOR MATERNAL CARE FOR LOW TRANSVERSE SCAR FROM PREVIOUS CESAREAN DELIVERY: ICD-10-CM

## 2019-11-06 PROCEDURE — 0502F SUBSEQUENT PRENATAL CARE: CPT | Performed by: OBSTETRICS & GYNECOLOGY

## 2019-11-06 NOTE — PROGRESS NOTES
CC: Prenatal visit    Jerrica Marquez is a 29 y.o.  at 30w6d.  Doing well.  No complaints.  Denies contractions, LOF, or VB.  Reports good FM.  Blood pressure is well controlled today.  She sees endocrine again for hyperthyroidism on .  Tolerating labetalol well at this time.  Scan today showed fetus at the 53rd percentile.    /75   Wt 121 kg (267 lb)   LMP 2019 (Approximate)   BMI 44.43 kg/m²   Fetal heart rate: 153 bpm  Fundal height: 30 cm            2nd pregnancy Problems (from 06/10/19 to present)     Problem Noted Resolved    Back pain affecting pregnancy in third trimester 2019 by Mahnaz Garay APRN No    Overview Signed 2019  4:04 PM by Mahnaz Garay APRN     Seeing Марина for PT         Encounter for maternal care for low transverse scar from previous  delivery 2019 by Mahnaz Garay APRN No    Overview Signed 2019  4:04 PM by Mahnaz Garay APRN     x1  Plans for repeat         History of pre-eclampsia in prior pregnancy, currently pregnant 2019 by Mahnaz Garay APRN No    Overview Addendum 2019  4:12 PM by Mahnaz Garay APRN     Taking daily ASA 81mg until 36 weeks  Baseline HTN labs done at [facility] on 19- all WNL         Maternal chronic hypertension in third trimester 2019 by Vazquez Davis DO No    Overview Signed 2019  9:49 AM by Vazquez Davis DO     Currently on labetalol 100 mg twice daily for chronic hypertension currently well controlled.  Patient also on baby aspirin 81 mg for history of preeclampsia in the last pregnancy.  Plan to get serial growth scans on this patient, and  testing starting at 32 weeks, would recommend delivery no later than 39 weeks.         Class 3 severe obesity with body mass index (BMI) of 40.0 to 44.9 in adult (CMS/HCC) 2018 by Beto Aquino APRN No    Hashimoto's thyroiditis 2016 by Beto Aquino APRN No    Overview  Addendum 2019  4:07 PM by Mahnaz Garay APRN     Patient currently on 175 mcg of Synthroid, being followed by endocrine for this problem.      TSH monthly per REBECA Salvador with TSH goal of < 2.5               A/P: Jerrica Marquez is a 29 y.o.  at 30w6d.  Doing well with appropriately progressing pregnancy at this time.  Hypertension is well controlled, hypothyroidism is well controlled.  No evidence of preeclampsia at this time.  Plan to start weekly BPP's in 2 weeks and will have patient do serial growth scans secondary to chronic hypertension.  Will plan for repeat  section at 39 weeks.  Sooner if any concerns arise.  Patient to return to see me in 2 weeks, sooner as needed.  Fetal kick count and  labor precautions given.  - RTC in 2 weeks     Diagnosis Plan   1. Hashimoto's thyroiditis     2. Class 3 severe obesity with body mass index (BMI) of 40.0 to 44.9 in adult, unspecified obesity type, unspecified whether serious comorbidity present (CMS/HCC)     3. Maternal chronic hypertension in third trimester     4. Back pain affecting pregnancy in third trimester     5. Encounter for maternal care for low transverse scar from previous  delivery     6. History of pre-eclampsia in prior pregnancy, currently pregnant     7. Anemia during pregnancy in second trimester     8. 30 weeks gestation of pregnancy       Vazquez Davis DO  2019  9:00 AM

## 2019-11-07 ENCOUNTER — INFUSION (OUTPATIENT)
Dept: PEDIATRICS | Facility: HOSPITAL | Age: 29
End: 2019-11-07

## 2019-11-07 VITALS
RESPIRATION RATE: 18 BRPM | TEMPERATURE: 98.1 F | DIASTOLIC BLOOD PRESSURE: 62 MMHG | HEART RATE: 96 BPM | SYSTOLIC BLOOD PRESSURE: 128 MMHG | OXYGEN SATURATION: 98 %

## 2019-11-07 DIAGNOSIS — D50.8 OTHER IRON DEFICIENCY ANEMIA: ICD-10-CM

## 2019-11-07 DIAGNOSIS — O99.012 ANEMIA DURING PREGNANCY IN SECOND TRIMESTER: Primary | ICD-10-CM

## 2019-11-07 PROCEDURE — 96365 THER/PROPH/DIAG IV INF INIT: CPT

## 2019-11-07 PROCEDURE — 25010000002 FERRIC CARBOXYMALTOSE 750 MG/15ML SOLUTION 15 ML VIAL: Performed by: INTERNAL MEDICINE

## 2019-11-07 RX ORDER — SODIUM CHLORIDE 9 MG/ML
250 INJECTION, SOLUTION INTRAVENOUS ONCE
Status: CANCELLED | OUTPATIENT
Start: 2019-11-07 | End: 2019-11-07

## 2019-11-07 RX ORDER — SODIUM CHLORIDE 9 MG/ML
250 INJECTION, SOLUTION INTRAVENOUS ONCE
Status: COMPLETED | OUTPATIENT
Start: 2019-11-07 | End: 2019-11-07

## 2019-11-07 RX ORDER — DIPHENHYDRAMINE HYDROCHLORIDE 50 MG/ML
50 INJECTION INTRAMUSCULAR; INTRAVENOUS AS NEEDED
Status: CANCELLED | OUTPATIENT
Start: 2019-11-07

## 2019-11-07 RX ORDER — DIPHENHYDRAMINE HYDROCHLORIDE 50 MG/ML
50 INJECTION INTRAMUSCULAR; INTRAVENOUS AS NEEDED
Status: DISCONTINUED | OUTPATIENT
Start: 2019-11-07 | End: 2019-11-07 | Stop reason: HOSPADM

## 2019-11-07 RX ORDER — METHYLPREDNISOLONE SODIUM SUCCINATE 125 MG/2ML
125 INJECTION, POWDER, LYOPHILIZED, FOR SOLUTION INTRAMUSCULAR; INTRAVENOUS ONCE AS NEEDED
Status: DISCONTINUED | OUTPATIENT
Start: 2019-11-07 | End: 2019-11-07 | Stop reason: HOSPADM

## 2019-11-07 RX ORDER — METHYLPREDNISOLONE SODIUM SUCCINATE 125 MG/2ML
125 INJECTION, POWDER, LYOPHILIZED, FOR SOLUTION INTRAMUSCULAR; INTRAVENOUS AS NEEDED
Status: CANCELLED | OUTPATIENT
Start: 2019-11-07

## 2019-11-07 RX ADMIN — FERRIC CARBOXYMALTOSE INJECTION 750 MG: 50 INJECTION, SOLUTION INTRAVENOUS at 08:26

## 2019-11-07 RX ADMIN — SODIUM CHLORIDE 250 ML: 9 INJECTION, SOLUTION INTRAVENOUS at 08:25

## 2019-11-08 ENCOUNTER — RESULTS ENCOUNTER (OUTPATIENT)
Dept: OBSTETRICS AND GYNECOLOGY | Facility: CLINIC | Age: 29
End: 2019-11-08

## 2019-11-08 DIAGNOSIS — O10.913 MATERNAL CHRONIC HYPERTENSION IN THIRD TRIMESTER: ICD-10-CM

## 2019-11-15 ENCOUNTER — RESULTS ENCOUNTER (OUTPATIENT)
Dept: OBSTETRICS AND GYNECOLOGY | Facility: CLINIC | Age: 29
End: 2019-11-15

## 2019-11-15 DIAGNOSIS — O10.913 MATERNAL CHRONIC HYPERTENSION IN THIRD TRIMESTER: ICD-10-CM

## 2019-11-18 ENCOUNTER — LAB (OUTPATIENT)
Dept: LAB | Facility: HOSPITAL | Age: 29
End: 2019-11-18

## 2019-11-18 DIAGNOSIS — E06.3 HASHIMOTO'S THYROIDITIS: ICD-10-CM

## 2019-11-18 DIAGNOSIS — O99.012 ANEMIA DURING PREGNANCY IN SECOND TRIMESTER: ICD-10-CM

## 2019-11-18 DIAGNOSIS — D50.8 OTHER IRON DEFICIENCY ANEMIA: ICD-10-CM

## 2019-11-18 DIAGNOSIS — E53.8 VITAMIN B12 DEFICIENCY: ICD-10-CM

## 2019-11-18 LAB
BASOPHILS # BLD AUTO: 0.02 10*3/MM3 (ref 0–0.2)
BASOPHILS NFR BLD AUTO: 0.2 % (ref 0–1.5)
DEPRECATED RDW RBC AUTO: 57.3 FL (ref 37–54)
EOSINOPHIL # BLD AUTO: 0.05 10*3/MM3 (ref 0–0.4)
EOSINOPHIL NFR BLD AUTO: 0.6 % (ref 0.3–6.2)
ERYTHROCYTE [DISTWIDTH] IN BLOOD BY AUTOMATED COUNT: 20.1 % (ref 12.3–15.4)
FERRITIN SERPL-MCNC: 167.4 NG/ML (ref 13–150)
FOLATE SERPL-MCNC: 10.9 NG/ML (ref 4.78–24.2)
HCT VFR BLD AUTO: 34 % (ref 34–46.6)
HGB BLD-MCNC: 10.9 G/DL (ref 12–15.9)
IMM GRANULOCYTES # BLD AUTO: 0.19 10*3/MM3 (ref 0–0.05)
IMM GRANULOCYTES NFR BLD AUTO: 2.1 % (ref 0–0.5)
IRON 24H UR-MRATE: 93 MCG/DL (ref 37–145)
IRON SATN MFR SERPL: 23 % (ref 20–50)
LYMPHOCYTES # BLD AUTO: 1.16 10*3/MM3 (ref 0.7–3.1)
LYMPHOCYTES NFR BLD AUTO: 12.8 % (ref 19.6–45.3)
MCH RBC QN AUTO: 26.3 PG (ref 26.6–33)
MCHC RBC AUTO-ENTMCNC: 32.1 G/DL (ref 31.5–35.7)
MCV RBC AUTO: 82.1 FL (ref 79–97)
MONOCYTES # BLD AUTO: 0.54 10*3/MM3 (ref 0.1–0.9)
MONOCYTES NFR BLD AUTO: 6 % (ref 5–12)
NEUTROPHILS # BLD AUTO: 7.11 10*3/MM3 (ref 1.7–7)
NEUTROPHILS NFR BLD AUTO: 78.3 % (ref 42.7–76)
NRBC BLD AUTO-RTO: 0 /100 WBC (ref 0–0.2)
PLATELET # BLD AUTO: 204 10*3/MM3 (ref 140–450)
PMV BLD AUTO: 10.3 FL (ref 6–12)
RBC # BLD AUTO: 4.14 10*6/MM3 (ref 3.77–5.28)
TIBC SERPL-MCNC: 404 MCG/DL (ref 298–536)
TRANSFERRIN SERPL-MCNC: 271 MG/DL (ref 200–360)
TSH SERPL DL<=0.05 MIU/L-ACNC: 0.78 UIU/ML (ref 0.27–4.2)
WBC NRBC COR # BLD: 9.07 10*3/MM3 (ref 3.4–10.8)

## 2019-11-18 PROCEDURE — 85025 COMPLETE CBC W/AUTO DIFF WBC: CPT

## 2019-11-18 PROCEDURE — 83540 ASSAY OF IRON: CPT

## 2019-11-18 PROCEDURE — 84466 ASSAY OF TRANSFERRIN: CPT

## 2019-11-18 PROCEDURE — 82746 ASSAY OF FOLIC ACID SERUM: CPT

## 2019-11-18 PROCEDURE — 84443 ASSAY THYROID STIM HORMONE: CPT

## 2019-11-18 PROCEDURE — 36415 COLL VENOUS BLD VENIPUNCTURE: CPT

## 2019-11-18 PROCEDURE — 82728 ASSAY OF FERRITIN: CPT

## 2019-11-20 ENCOUNTER — ROUTINE PRENATAL (OUTPATIENT)
Dept: OBSTETRICS AND GYNECOLOGY | Facility: CLINIC | Age: 29
End: 2019-11-20

## 2019-11-20 ENCOUNTER — TELEPHONE (OUTPATIENT)
Dept: ONCOLOGY | Facility: HOSPITAL | Age: 29
End: 2019-11-20

## 2019-11-20 VITALS — SYSTOLIC BLOOD PRESSURE: 123 MMHG | BODY MASS INDEX: 45.43 KG/M2 | DIASTOLIC BLOOD PRESSURE: 75 MMHG | WEIGHT: 273 LBS

## 2019-11-20 DIAGNOSIS — Z3A.32 32 WEEKS GESTATION OF PREGNANCY: Primary | ICD-10-CM

## 2019-11-20 DIAGNOSIS — E06.3 HASHIMOTO'S THYROIDITIS: ICD-10-CM

## 2019-11-20 DIAGNOSIS — O10.913 MATERNAL CHRONIC HYPERTENSION IN THIRD TRIMESTER: ICD-10-CM

## 2019-11-20 DIAGNOSIS — M54.9 BACK PAIN AFFECTING PREGNANCY IN THIRD TRIMESTER: ICD-10-CM

## 2019-11-20 DIAGNOSIS — O34.211 ENCOUNTER FOR MATERNAL CARE FOR LOW TRANSVERSE SCAR FROM PREVIOUS CESAREAN DELIVERY: ICD-10-CM

## 2019-11-20 DIAGNOSIS — O99.891 BACK PAIN AFFECTING PREGNANCY IN THIRD TRIMESTER: ICD-10-CM

## 2019-11-20 DIAGNOSIS — O09.299 HISTORY OF PRE-ECLAMPSIA IN PRIOR PREGNANCY, CURRENTLY PREGNANT: ICD-10-CM

## 2019-11-20 PROCEDURE — 90471 IMMUNIZATION ADMIN: CPT | Performed by: OBSTETRICS & GYNECOLOGY

## 2019-11-20 PROCEDURE — 90715 TDAP VACCINE 7 YRS/> IM: CPT | Performed by: OBSTETRICS & GYNECOLOGY

## 2019-11-20 PROCEDURE — 0502F SUBSEQUENT PRENATAL CARE: CPT | Performed by: OBSTETRICS & GYNECOLOGY

## 2019-11-20 NOTE — PROGRESS NOTES
CC: Prenatal visit    Jerrica Marquez is a 29 y.o.  at 32w6d.  Doing well.  No complaints.  Denies contractions, LOF, or VB.  Reports good FM.  Blood pressure normal today.  Denies any headache, vision changes, or right upper quadrant pain.  8 out of 8 BPP today.    /75   Wt 124 kg (273 lb)   LMP 2019 (Approximate)   BMI 45.43 kg/m²   Fetal heart rate: 141 bpm  Fundal height: 32 cm          2nd pregnancy Problems (from 06/10/19 to present)     Problem Noted Resolved    Back pain affecting pregnancy in third trimester 2019 by Mahnaz Garay APRN No    Overview Signed 2019  4:04 PM by Mahnaz Garay APRN     Seeing Марина for PT         Encounter for maternal care for low transverse scar from previous  delivery 2019 by Mahnaz Garay APRN No    Overview Signed 2019  4:04 PM by Mahnaz Garay APRN     x1  Plans for repeat         History of pre-eclampsia in prior pregnancy, currently pregnant 2019 by Mahnaz Garay APRN No    Overview Addendum 2019  4:12 PM by Mahnaz Garay APRN     Taking daily ASA 81mg until 36 weeks  Baseline HTN labs done at [facility] on 19- all WNL         Maternal chronic hypertension in third trimester 2019 by Vazquez Davis DO No    Overview Signed 2019  9:49 AM by Vazquez Davis DO     Currently on labetalol 100 mg twice daily for chronic hypertension currently well controlled.  Patient also on baby aspirin 81 mg for history of preeclampsia in the last pregnancy.  Plan to get serial growth scans on this patient, and  testing starting at 32 weeks, would recommend delivery no later than 39 weeks.         Class 3 severe obesity with body mass index (BMI) of 40.0 to 44.9 in adult (CMS/HCC) 2018 by Beto Aquino APRN No    Hashimoto's thyroiditis 2016 by Beto Aquino APRN No    Overview Addendum 2019  4:07 PM by Mahnaz Garay APRN     Patient currently  on 175 mcg of Synthroid, being followed by endocrine for this problem.      TSH monthly per REBECA Salvador with TSH goal of < 2.5               A/P: Jerrica Marquez is a 29 y.o.  at 32w6d.  Doing well with appropriately progressing pregnancy at this time.  Continue weekly BPP's for chronic hypertension.  Return to see me in 2 weeks, sooner as needed.  Repeat  at 39 weeks.  Tdap today.    - RTC in 2 weeks     Diagnosis Plan   1. 32 weeks gestation of pregnancy     2. Back pain affecting pregnancy in third trimester     3. Encounter for maternal care for low transverse scar from previous  delivery     4. History of pre-eclampsia in prior pregnancy, currently pregnant     5. Maternal chronic hypertension in third trimester     6. Hashimoto's thyroiditis       Vazquez Davis DO  2019  10:22 AM

## 2019-11-20 NOTE — TELEPHONE ENCOUNTER
----- Message from Ayde Rodriguez MD sent at 11/20/2019  1:24 PM CST -----  Regarding: RE: labs  Contact: [phone]  That's fine  ----- Message -----  From: Hunter Aquino, HARITHA  Sent: 11/20/2019  12:01 PM  To: Ayde Rodriguez MD  Subject: FW: labs                                         Will labs be okay to use still?     ----- Message -----  From: Luiza Salinas  Sent: 11/20/2019  11:57 AM  To: Ayde Rodriguez MD, #  Subject: labs                                             Pt called and said that she needed to have labs done, and was told the tech specifically not to draw the ones we needed just yet.  Tech zina our labs anyway.  Does doc need to enter new orders for when pt comes back to see us in Dec?

## 2019-11-21 DIAGNOSIS — O10.913 MATERNAL CHRONIC HYPERTENSION IN THIRD TRIMESTER: Primary | ICD-10-CM

## 2019-11-21 DIAGNOSIS — O09.299 HISTORY OF PRE-ECLAMPSIA IN PRIOR PREGNANCY, CURRENTLY PREGNANT: ICD-10-CM

## 2019-11-22 ENCOUNTER — RESULTS ENCOUNTER (OUTPATIENT)
Dept: OBSTETRICS AND GYNECOLOGY | Facility: CLINIC | Age: 29
End: 2019-11-22

## 2019-11-22 DIAGNOSIS — O09.299 HISTORY OF PRE-ECLAMPSIA IN PRIOR PREGNANCY, CURRENTLY PREGNANT: ICD-10-CM

## 2019-11-22 DIAGNOSIS — O10.913 MATERNAL CHRONIC HYPERTENSION IN THIRD TRIMESTER: ICD-10-CM

## 2019-11-25 RX ORDER — CYANOCOBALAMIN 1000 UG/ML
INJECTION, SOLUTION INTRAMUSCULAR; SUBCUTANEOUS
Qty: 9 ML | Refills: 1 | Status: SHIPPED | OUTPATIENT
Start: 2019-11-25 | End: 2020-02-11

## 2019-11-29 ENCOUNTER — RESULTS ENCOUNTER (OUTPATIENT)
Dept: OBSTETRICS AND GYNECOLOGY | Facility: CLINIC | Age: 29
End: 2019-11-29

## 2019-11-29 DIAGNOSIS — O09.299 HISTORY OF PRE-ECLAMPSIA IN PRIOR PREGNANCY, CURRENTLY PREGNANT: ICD-10-CM

## 2019-11-29 DIAGNOSIS — O10.913 MATERNAL CHRONIC HYPERTENSION IN THIRD TRIMESTER: ICD-10-CM

## 2019-12-02 ENCOUNTER — TELEPHONE (OUTPATIENT)
Dept: ONCOLOGY | Facility: HOSPITAL | Age: 29
End: 2019-12-02

## 2019-12-02 NOTE — TELEPHONE ENCOUNTER
----- Message from Ayde Rodriguez MD sent at 11/28/2019  7:18 PM CST -----  Hg improved. Iron studies normal.

## 2019-12-03 ENCOUNTER — ROUTINE PRENATAL (OUTPATIENT)
Dept: OBSTETRICS AND GYNECOLOGY | Facility: CLINIC | Age: 29
End: 2019-12-03

## 2019-12-03 VITALS — SYSTOLIC BLOOD PRESSURE: 118 MMHG | DIASTOLIC BLOOD PRESSURE: 77 MMHG | WEIGHT: 275 LBS | BODY MASS INDEX: 45.76 KG/M2

## 2019-12-03 DIAGNOSIS — Z3A.34 34 WEEKS GESTATION OF PREGNANCY: Primary | ICD-10-CM

## 2019-12-03 DIAGNOSIS — M54.9 BACK PAIN AFFECTING PREGNANCY IN THIRD TRIMESTER: ICD-10-CM

## 2019-12-03 DIAGNOSIS — O99.891 BACK PAIN AFFECTING PREGNANCY IN THIRD TRIMESTER: ICD-10-CM

## 2019-12-03 DIAGNOSIS — E06.3 HASHIMOTO'S THYROIDITIS: ICD-10-CM

## 2019-12-03 DIAGNOSIS — O34.211 ENCOUNTER FOR MATERNAL CARE FOR LOW TRANSVERSE SCAR FROM PREVIOUS CESAREAN DELIVERY: ICD-10-CM

## 2019-12-03 DIAGNOSIS — O09.299 HISTORY OF PRE-ECLAMPSIA IN PRIOR PREGNANCY, CURRENTLY PREGNANT: ICD-10-CM

## 2019-12-03 DIAGNOSIS — O10.913 MATERNAL CHRONIC HYPERTENSION IN THIRD TRIMESTER: ICD-10-CM

## 2019-12-03 PROCEDURE — 0502F SUBSEQUENT PRENATAL CARE: CPT | Performed by: OBSTETRICS & GYNECOLOGY

## 2019-12-03 NOTE — PROGRESS NOTES
CC: Prenatal visit    Jerrica Marquez is a 29 y.o.  at 34w5d.  Doing well.  No complaints.  Denies contractions, LOF, or VB.  Reports good FM.  Blood pressure is normal today.  Denies any headache, vision changes or right upper quadrant pains.    /77   Wt 125 kg (275 lb)   LMP 2019 (Approximate)   BMI 45.76 kg/m²     Fundal Height (cm): 34 cm  Fetal Heart Rate: 156    2nd pregnancy Problems (from 06/10/19 to present)     Problem Noted Resolved    Back pain affecting pregnancy in third trimester 2019 by Mahnaz Garay APRN No    Overview Signed 2019  4:04 PM by Mahnaz Garay APRN     Seeing Марина for PT         Encounter for maternal care for low transverse scar from previous  delivery 2019 by Mahnaz Garay APRN No    Overview Signed 2019  4:04 PM by Mahnaz Garay APRN     x1  Plans for repeat         History of pre-eclampsia in prior pregnancy, currently pregnant 2019 by Mahnaz Garay APRN No    Overview Addendum 2019  4:12 PM by Mahnaz Garay APRN     Taking daily ASA 81mg until 36 weeks  Baseline HTN labs done at [facility] on 19- all WNL         Maternal chronic hypertension in third trimester 2019 by Vazquez Davis DO No    Overview Signed 2019  9:49 AM by Vazquez Davis DO     Currently on labetalol 100 mg twice daily for chronic hypertension currently well controlled.  Patient also on baby aspirin 81 mg for history of preeclampsia in the last pregnancy.  Plan to get serial growth scans on this patient, and  testing starting at 32 weeks, would recommend delivery no later than 39 weeks.         Class 3 severe obesity with body mass index (BMI) of 40.0 to 44.9 in adult (CMS/HCC) 2018 by Beto Aquino APRN No    Hashimoto's thyroiditis 2016 by Beto Aquino APRN No    Overview Addendum 2019  4:07 PM by Mahnaz Garay APRN     Patient currently on 175 mcg of Synthroid,  being followed by endocrine for this problem.      TSH monthly per REBECA Salvador with TSH goal of < 2.5               A/P: Jerrica Marquez is a 29 y.o.  at 34w5d.  Doing well with appropriately progressing pregnancy complicated by chronic hypertension with normotensive blood pressures today.  BPP today 8 out of 8, patient to continue weekly BPP's for chronic hypertension.  Return to see me in 2 weeks.  We will schedule  for  patient to return sooner as needed.   labor and fetal kick count precautions given.  Preeclampsia precautions given.  - RTC in 2 weeks     Diagnosis Plan   1. 34 weeks gestation of pregnancy     2. Back pain affecting pregnancy in third trimester     3. Encounter for maternal care for low transverse scar from previous  delivery     4. History of pre-eclampsia in prior pregnancy, currently pregnant     5. Maternal chronic hypertension in third trimester     6. Hashimoto's thyroiditis       Vazquez Davis,   12/3/2019  1:28 PM

## 2019-12-06 ENCOUNTER — RESULTS ENCOUNTER (OUTPATIENT)
Dept: OBSTETRICS AND GYNECOLOGY | Facility: CLINIC | Age: 29
End: 2019-12-06

## 2019-12-06 DIAGNOSIS — O09.299 HISTORY OF PRE-ECLAMPSIA IN PRIOR PREGNANCY, CURRENTLY PREGNANT: ICD-10-CM

## 2019-12-06 DIAGNOSIS — O10.913 MATERNAL CHRONIC HYPERTENSION IN THIRD TRIMESTER: ICD-10-CM

## 2019-12-09 ENCOUNTER — OFFICE VISIT (OUTPATIENT)
Dept: ENDOCRINOLOGY | Facility: CLINIC | Age: 29
End: 2019-12-09

## 2019-12-09 VITALS
HEART RATE: 90 BPM | OXYGEN SATURATION: 97 % | SYSTOLIC BLOOD PRESSURE: 126 MMHG | BODY MASS INDEX: 46.72 KG/M2 | WEIGHT: 280.4 LBS | HEIGHT: 65 IN | DIASTOLIC BLOOD PRESSURE: 72 MMHG

## 2019-12-09 DIAGNOSIS — Z3A.36 36 WEEKS GESTATION OF PREGNANCY: ICD-10-CM

## 2019-12-09 DIAGNOSIS — E06.3 HASHIMOTO'S THYROIDITIS: Primary | ICD-10-CM

## 2019-12-09 DIAGNOSIS — E66.01 CLASS 3 SEVERE OBESITY WITHOUT SERIOUS COMORBIDITY WITH BODY MASS INDEX (BMI) OF 40.0 TO 44.9 IN ADULT, UNSPECIFIED OBESITY TYPE (HCC): ICD-10-CM

## 2019-12-09 PROCEDURE — 99214 OFFICE O/P EST MOD 30 MIN: CPT | Performed by: NURSE PRACTITIONER

## 2019-12-10 DIAGNOSIS — O10.913 MATERNAL CHRONIC HYPERTENSION IN THIRD TRIMESTER: ICD-10-CM

## 2019-12-12 ENCOUNTER — APPOINTMENT (OUTPATIENT)
Dept: ONCOLOGY | Facility: CLINIC | Age: 29
End: 2019-12-12

## 2019-12-13 ENCOUNTER — RESULTS ENCOUNTER (OUTPATIENT)
Dept: OBSTETRICS AND GYNECOLOGY | Facility: CLINIC | Age: 29
End: 2019-12-13

## 2019-12-13 DIAGNOSIS — O09.299 HISTORY OF PRE-ECLAMPSIA IN PRIOR PREGNANCY, CURRENTLY PREGNANT: ICD-10-CM

## 2019-12-13 DIAGNOSIS — O10.913 MATERNAL CHRONIC HYPERTENSION IN THIRD TRIMESTER: ICD-10-CM

## 2019-12-17 ENCOUNTER — HOSPITAL ENCOUNTER (OUTPATIENT)
Facility: HOSPITAL | Age: 29
Discharge: HOME | End: 2019-12-17
Attending: OBSTETRICS & GYNECOLOGY | Admitting: OBSTETRICS & GYNECOLOGY

## 2019-12-17 ENCOUNTER — ROUTINE PRENATAL (OUTPATIENT)
Dept: OBSTETRICS AND GYNECOLOGY | Facility: CLINIC | Age: 29
End: 2019-12-17

## 2019-12-17 VITALS
SYSTOLIC BLOOD PRESSURE: 145 MMHG | RESPIRATION RATE: 20 BRPM | TEMPERATURE: 98.3 F | HEIGHT: 65 IN | OXYGEN SATURATION: 98 % | DIASTOLIC BLOOD PRESSURE: 76 MMHG | BODY MASS INDEX: 45.88 KG/M2 | WEIGHT: 275.4 LBS | HEART RATE: 96 BPM

## 2019-12-17 VITALS — DIASTOLIC BLOOD PRESSURE: 90 MMHG | WEIGHT: 275.4 LBS | SYSTOLIC BLOOD PRESSURE: 160 MMHG | BODY MASS INDEX: 45.83 KG/M2

## 2019-12-17 DIAGNOSIS — O99.891 BACK PAIN AFFECTING PREGNANCY IN THIRD TRIMESTER: ICD-10-CM

## 2019-12-17 DIAGNOSIS — E06.3 HASHIMOTO'S THYROIDITIS: ICD-10-CM

## 2019-12-17 DIAGNOSIS — M54.9 BACK PAIN AFFECTING PREGNANCY IN THIRD TRIMESTER: ICD-10-CM

## 2019-12-17 DIAGNOSIS — Z3A.36 36 WEEKS GESTATION OF PREGNANCY: Primary | ICD-10-CM

## 2019-12-17 DIAGNOSIS — E66.01 CLASS 3 SEVERE OBESITY WITHOUT SERIOUS COMORBIDITY WITH BODY MASS INDEX (BMI) OF 40.0 TO 44.9 IN ADULT, UNSPECIFIED OBESITY TYPE (HCC): ICD-10-CM

## 2019-12-17 DIAGNOSIS — O10.913 MATERNAL CHRONIC HYPERTENSION IN THIRD TRIMESTER: ICD-10-CM

## 2019-12-17 DIAGNOSIS — O09.299 HISTORY OF PRE-ECLAMPSIA IN PRIOR PREGNANCY, CURRENTLY PREGNANT: ICD-10-CM

## 2019-12-17 DIAGNOSIS — O34.211 ENCOUNTER FOR MATERNAL CARE FOR LOW TRANSVERSE SCAR FROM PREVIOUS CESAREAN DELIVERY: ICD-10-CM

## 2019-12-17 LAB
ABO GROUP BLD: NORMAL
ALBUMIN SERPL-MCNC: 3.3 G/DL (ref 3.5–5.2)
ALBUMIN/GLOB SERPL: 1 G/DL
ALP SERPL-CCNC: 121 U/L (ref 39–117)
ALT SERPL W P-5'-P-CCNC: 23 U/L (ref 1–33)
AMPHET+METHAMPHET UR QL: NEGATIVE
AMPHETAMINES UR QL: NEGATIVE
ANION GAP SERPL CALCULATED.3IONS-SCNC: 11 MMOL/L (ref 5–15)
AST SERPL-CCNC: 17 U/L (ref 1–32)
BARBITURATES UR QL SCN: NEGATIVE
BENZODIAZ UR QL SCN: NEGATIVE
BILIRUB SERPL-MCNC: 0.3 MG/DL (ref 0.2–1.2)
BILIRUB UR QL STRIP: NEGATIVE
BLD GP AB SCN SERPL QL: NEGATIVE
BUN BLD-MCNC: 5 MG/DL (ref 6–20)
BUN/CREAT SERPL: 11.1 (ref 7–25)
BUPRENORPHINE SERPL-MCNC: NEGATIVE NG/ML
CALCIUM SPEC-SCNC: 8.9 MG/DL (ref 8.6–10.5)
CANNABINOIDS SERPL QL: NEGATIVE
CHLORIDE SERPL-SCNC: 104 MMOL/L (ref 98–107)
CLARITY UR: CLEAR
CO2 SERPL-SCNC: 20 MMOL/L (ref 22–29)
COCAINE UR QL: NEGATIVE
COLOR UR: NORMAL
CREAT BLD-MCNC: 0.45 MG/DL (ref 0.57–1)
CREAT UR-MCNC: 168.4 MG/DL
DEPRECATED RDW RBC AUTO: 54.8 FL (ref 37–54)
ERYTHROCYTE [DISTWIDTH] IN BLOOD BY AUTOMATED COUNT: 18.2 % (ref 12.3–15.4)
GFR SERPL CREATININE-BSD FRML MDRD: >150 ML/MIN/1.73
GLOBULIN UR ELPH-MCNC: 3.3 GM/DL
GLUCOSE BLD-MCNC: 116 MG/DL (ref 65–99)
GLUCOSE UR STRIP-MCNC: NEGATIVE MG/DL
HCT VFR BLD AUTO: 35.7 % (ref 34–46.6)
HGB BLD-MCNC: 12 G/DL (ref 12–15.9)
HGB UR QL STRIP.AUTO: NEGATIVE
KETONES UR QL STRIP: NEGATIVE
LEUKOCYTE ESTERASE UR QL STRIP.AUTO: NEGATIVE
Lab: NORMAL
MCH RBC QN AUTO: 27.5 PG (ref 26.6–33)
MCHC RBC AUTO-ENTMCNC: 33.6 G/DL (ref 31.5–35.7)
MCV RBC AUTO: 81.7 FL (ref 79–97)
METHADONE UR QL SCN: NEGATIVE
NITRITE UR QL STRIP: NEGATIVE
OPIATES UR QL: NEGATIVE
OXYCODONE UR QL SCN: NEGATIVE
PCP UR QL SCN: NEGATIVE
PH UR STRIP.AUTO: 6 [PH] (ref 5–9)
PLATELET # BLD AUTO: 219 10*3/MM3 (ref 140–450)
PMV BLD AUTO: 9.6 FL (ref 6–12)
POTASSIUM BLD-SCNC: 4 MMOL/L (ref 3.5–5.2)
PROPOXYPH UR QL: NEGATIVE
PROT SERPL-MCNC: 6.6 G/DL (ref 6–8.5)
PROT UR QL STRIP: NEGATIVE
PROT UR-MCNC: 28 MG/DL
PROT/CREAT UR: 166.3 MG/G CREA (ref 0–200)
RBC # BLD AUTO: 4.37 10*6/MM3 (ref 3.77–5.28)
RH BLD: POSITIVE
SODIUM BLD-SCNC: 135 MMOL/L (ref 136–145)
SP GR UR STRIP: 1.02 (ref 1–1.03)
T&S EXPIRATION DATE: NORMAL
TRICYCLICS UR QL SCN: NEGATIVE
UROBILINOGEN UR QL STRIP: NORMAL
WBC NRBC COR # BLD: 10.71 10*3/MM3 (ref 3.4–10.8)

## 2019-12-17 PROCEDURE — 86850 RBC ANTIBODY SCREEN: CPT | Performed by: OBSTETRICS & GYNECOLOGY

## 2019-12-17 PROCEDURE — G0463 HOSPITAL OUTPT CLINIC VISIT: HCPCS

## 2019-12-17 PROCEDURE — 86900 BLOOD TYPING SEROLOGIC ABO: CPT | Performed by: OBSTETRICS & GYNECOLOGY

## 2019-12-17 PROCEDURE — 85027 COMPLETE CBC AUTOMATED: CPT | Performed by: OBSTETRICS & GYNECOLOGY

## 2019-12-17 PROCEDURE — 0502F SUBSEQUENT PRENATAL CARE: CPT | Performed by: OBSTETRICS & GYNECOLOGY

## 2019-12-17 PROCEDURE — 82570 ASSAY OF URINE CREATININE: CPT | Performed by: OBSTETRICS & GYNECOLOGY

## 2019-12-17 PROCEDURE — 81003 URINALYSIS AUTO W/O SCOPE: CPT | Performed by: OBSTETRICS & GYNECOLOGY

## 2019-12-17 PROCEDURE — 59025 FETAL NON-STRESS TEST: CPT

## 2019-12-17 PROCEDURE — 84156 ASSAY OF PROTEIN URINE: CPT | Performed by: OBSTETRICS & GYNECOLOGY

## 2019-12-17 PROCEDURE — 86901 BLOOD TYPING SEROLOGIC RH(D): CPT | Performed by: OBSTETRICS & GYNECOLOGY

## 2019-12-17 PROCEDURE — 80053 COMPREHEN METABOLIC PANEL: CPT | Performed by: OBSTETRICS & GYNECOLOGY

## 2019-12-17 PROCEDURE — 80307 DRUG TEST PRSMV CHEM ANLYZR: CPT | Performed by: OBSTETRICS & GYNECOLOGY

## 2019-12-17 RX ORDER — SODIUM CHLORIDE, SODIUM LACTATE, POTASSIUM CHLORIDE, CALCIUM CHLORIDE 600; 310; 30; 20 MG/100ML; MG/100ML; MG/100ML; MG/100ML
125 INJECTION, SOLUTION INTRAVENOUS CONTINUOUS
Status: DISCONTINUED | OUTPATIENT
Start: 2019-12-17 | End: 2019-12-17 | Stop reason: HOSPADM

## 2019-12-17 RX ORDER — SODIUM CHLORIDE 0.9 % (FLUSH) 0.9 %
10 SYRINGE (ML) INJECTION AS NEEDED
Status: DISCONTINUED | OUTPATIENT
Start: 2019-12-17 | End: 2019-12-17 | Stop reason: HOSPADM

## 2019-12-17 RX ORDER — SODIUM CHLORIDE, SODIUM LACTATE, POTASSIUM CHLORIDE, CALCIUM CHLORIDE 600; 310; 30; 20 MG/100ML; MG/100ML; MG/100ML; MG/100ML
INJECTION, SOLUTION INTRAVENOUS
Status: COMPLETED
Start: 2019-12-17 | End: 2019-12-17

## 2019-12-17 RX ADMIN — SODIUM CHLORIDE, POTASSIUM CHLORIDE, SODIUM LACTATE AND CALCIUM CHLORIDE 1000 ML: 600; 310; 30; 20 INJECTION, SOLUTION INTRAVENOUS at 10:10

## 2019-12-17 RX ADMIN — SODIUM CHLORIDE, SODIUM LACTATE, POTASSIUM CHLORIDE, CALCIUM CHLORIDE 1000 ML: 600; 310; 30; 20 INJECTION, SOLUTION INTRAVENOUS at 10:10

## 2019-12-17 NOTE — NON STRESS TEST
Jerrica Marquez, a  at 36w5d with an TERESITA of 2020, by Last Menstrual Period, was seen at [facility] for a nonstress test.    Chief Complaint   Patient presents with   • Elevated Blood Pressure     sent from the office       Patient Active Problem List   Diagnosis   • Hashimoto's thyroiditis   • Vitamin D deficiency   • Class 3 severe obesity with body mass index (BMI) of 40.0 to 44.9 in adult (CMS/[facility])   • Maternal chronic hypertension in third trimester   • Back pain affecting pregnancy in third trimester   • Obesity affecting pregnancy in second trimester   • Encounter for maternal care for low transverse scar from previous  delivery   • History of pre-eclampsia in prior pregnancy, currently pregnant   • Anemia during pregnancy in second trimester   • Iron deficiency anemia   • 36 weeks gestation of pregnancy       Start Time:1115  Stop Time: 1215    Interpretation A  Nonstress Test Interpretation A: Reactive (19 1318 : Nelsy Estrada, RN)  Comments A: Reviewed with Dr. Davis. (19 1318 : Nelsy Estrada, RN)

## 2019-12-17 NOTE — PROGRESS NOTES
CC: Prenatal visit    Jerrica Marquez is a 29 y.o.  at 36w5d.  Doing well.  No complaints.  Denies contractions, LOF, or VB.  Reports good FM.  Blood pressures are severe range in clinic today at 160/90 and 170/90.  Has had a mild headache over the last couple days but no current headache at this time.  Denies any vision changes or right upper quadrant pain at this time.  Patient had growth scan which was reassuring today and also had BPP which was 8 out of 8.    /90   Wt 125 kg (275 lb 6.4 oz)   LMP 2019 (Approximate)   BMI 45.83 kg/m²     Fundal Height (cm): 36 cm  Fetal Heart Rate: 140    2nd pregnancy Problems (from 06/10/19 to present)     Problem Noted Resolved    Back pain affecting pregnancy in third trimester 2019 by Mahnaz Garay APRN No    Overview Signed 2019  4:04 PM by Mahnaz Garay APRN     Seeing Марина for PT         Encounter for maternal care for low transverse scar from previous  delivery 2019 by Mahnaz Garay APRN No    Overview Signed 2019  4:04 PM by Mahnaz Garay APRN     x1  Plans for repeat         History of pre-eclampsia in prior pregnancy, currently pregnant 2019 by Mahnaz Garay APRN No    Overview Addendum 2019  4:12 PM by Mahnaz Garay APRN     Taking daily ASA 81mg until 36 weeks  Baseline HTN labs done at [facility] on 19- all WNL         Maternal chronic hypertension in third trimester 2019 by Vazquez Davis DO No    Overview Signed 2019  9:49 AM by Vazquez Davis DO     Currently on labetalol 100 mg twice daily for chronic hypertension currently well controlled.  Patient also on baby aspirin 81 mg for history of preeclampsia in the last pregnancy.  Plan to get serial growth scans on this patient, and  testing starting at 32 weeks, would recommend delivery no later than 39 weeks.         Class 3 severe obesity with body mass index (BMI) of 40.0 to 44.9 in adult  (CMS/[facility]) 2018 by Beto Aquino APRN No    Hashimoto's thyroiditis 2016 by Beto Aquino APRN No    Overview Addendum 2019  4:07 PM by Mahnaz Garya APRN     Patient currently on 175 mcg of Synthroid, being followed by endocrine for this problem.      TSH monthly per REBECA Salvador with TSH goal of < 2.5               A/P: Jerrica Marquez is a 29 y.o.  at 36w5d.  Patient with chronic hypertension now with concern for superimposed preeclampsia with severe range blood pressures plan to send patient to labor and delivery for further blood pressure monitoring, plan to get preeclampsia labs and evaluate if patient appears to have superimposed preeclampsia will move forward with delivery at this time.  - RTC in 1 weeks     Diagnosis Plan   1. 36 weeks gestation of pregnancy     2. Back pain affecting pregnancy in third trimester     3. Encounter for maternal care for low transverse scar from previous  delivery     4. History of pre-eclampsia in prior pregnancy, currently pregnant     5. Maternal chronic hypertension in third trimester     6. Class 3 severe obesity without serious comorbidity with body mass index (BMI) of 40.0 to 44.9 in adult, unspecified obesity type (CMS/[facility])     7. Hashimoto's thyroiditis       Vazquez Davis DO  2019  9:40 AM

## 2019-12-18 ENCOUNTER — ROUTINE PRENATAL (OUTPATIENT)
Dept: OBSTETRICS AND GYNECOLOGY | Facility: CLINIC | Age: 29
End: 2019-12-18

## 2019-12-18 VITALS — BODY MASS INDEX: 45.76 KG/M2 | DIASTOLIC BLOOD PRESSURE: 83 MMHG | SYSTOLIC BLOOD PRESSURE: 152 MMHG | WEIGHT: 275 LBS

## 2019-12-18 DIAGNOSIS — Z3A.36 36 WEEKS GESTATION OF PREGNANCY: ICD-10-CM

## 2019-12-18 DIAGNOSIS — O10.913 CHRONIC HYPERTENSION WITH EXACERBATION DURING PREGNANCY IN THIRD TRIMESTER: Primary | ICD-10-CM

## 2019-12-18 DIAGNOSIS — E06.3 HASHIMOTO'S THYROIDITIS: ICD-10-CM

## 2019-12-18 DIAGNOSIS — O10.913 MATERNAL CHRONIC HYPERTENSION IN THIRD TRIMESTER: ICD-10-CM

## 2019-12-18 DIAGNOSIS — O99.891 BACK PAIN AFFECTING PREGNANCY IN THIRD TRIMESTER: ICD-10-CM

## 2019-12-18 DIAGNOSIS — M54.9 BACK PAIN AFFECTING PREGNANCY IN THIRD TRIMESTER: ICD-10-CM

## 2019-12-18 DIAGNOSIS — O09.299 HISTORY OF PRE-ECLAMPSIA IN PRIOR PREGNANCY, CURRENTLY PREGNANT: ICD-10-CM

## 2019-12-18 DIAGNOSIS — O34.211 ENCOUNTER FOR MATERNAL CARE FOR LOW TRANSVERSE SCAR FROM PREVIOUS CESAREAN DELIVERY: ICD-10-CM

## 2019-12-18 PROCEDURE — 0502F SUBSEQUENT PRENATAL CARE: CPT | Performed by: OBSTETRICS & GYNECOLOGY

## 2019-12-18 RX ORDER — PROMETHAZINE HYDROCHLORIDE 25 MG/ML
6.25 INJECTION, SOLUTION INTRAMUSCULAR; INTRAVENOUS
Status: CANCELLED | OUTPATIENT
Start: 2019-12-18

## 2019-12-18 RX ORDER — METHYLERGONOVINE MALEATE 0.2 MG/ML
200 INJECTION INTRAVENOUS ONCE AS NEEDED
Status: CANCELLED | OUTPATIENT
Start: 2019-12-18

## 2019-12-18 RX ORDER — SODIUM CHLORIDE 0.9 % (FLUSH) 0.9 %
3-10 SYRINGE (ML) INJECTION AS NEEDED
Status: CANCELLED | OUTPATIENT
Start: 2019-12-18

## 2019-12-18 RX ORDER — SODIUM CHLORIDE 0.9 % (FLUSH) 0.9 %
3 SYRINGE (ML) INJECTION EVERY 12 HOURS SCHEDULED
Status: CANCELLED | OUTPATIENT
Start: 2019-12-18

## 2019-12-18 RX ORDER — OXYTOCIN 10 [USP'U]/ML
85 INJECTION, SOLUTION INTRAMUSCULAR; INTRAVENOUS ONCE
Status: CANCELLED | OUTPATIENT
Start: 2019-12-18

## 2019-12-18 RX ORDER — LIDOCAINE HYDROCHLORIDE 10 MG/ML
5 INJECTION, SOLUTION EPIDURAL; INFILTRATION; INTRACAUDAL; PERINEURAL AS NEEDED
Status: CANCELLED | OUTPATIENT
Start: 2019-12-18

## 2019-12-18 RX ORDER — TRISODIUM CITRATE DIHYDRATE AND CITRIC ACID MONOHYDRATE 500; 334 MG/5ML; MG/5ML
30 SOLUTION ORAL ONCE
Status: CANCELLED | OUTPATIENT
Start: 2019-12-18 | End: 2019-12-18

## 2019-12-18 RX ORDER — MISOPROSTOL 100 UG/1
800 TABLET ORAL AS NEEDED
Status: CANCELLED | OUTPATIENT
Start: 2019-12-18

## 2019-12-18 RX ORDER — ONDANSETRON 4 MG/1
4 TABLET, FILM COATED ORAL EVERY 6 HOURS PRN
Status: CANCELLED | OUTPATIENT
Start: 2019-12-18

## 2019-12-18 RX ORDER — ONDANSETRON 2 MG/ML
4 INJECTION INTRAMUSCULAR; INTRAVENOUS EVERY 6 HOURS PRN
Status: CANCELLED | OUTPATIENT
Start: 2019-12-18

## 2019-12-18 RX ORDER — OXYTOCIN 10 [USP'U]/ML
650 INJECTION, SOLUTION INTRAMUSCULAR; INTRAVENOUS ONCE
Status: CANCELLED | OUTPATIENT
Start: 2019-12-18

## 2019-12-18 RX ORDER — PROMETHAZINE HYDROCHLORIDE 25 MG/1
12.5 SUPPOSITORY RECTAL EVERY 6 HOURS PRN
Status: CANCELLED | OUTPATIENT
Start: 2019-12-18

## 2019-12-18 RX ORDER — CARBOPROST TROMETHAMINE 250 UG/ML
250 INJECTION, SOLUTION INTRAMUSCULAR AS NEEDED
Status: CANCELLED | OUTPATIENT
Start: 2019-12-18

## 2019-12-18 RX ORDER — PROMETHAZINE HYDROCHLORIDE 25 MG/1
12.5 TABLET ORAL EVERY 6 HOURS PRN
Status: CANCELLED | OUTPATIENT
Start: 2019-12-18

## 2019-12-19 ENCOUNTER — ANESTHESIA EVENT (OUTPATIENT)
Dept: LABOR AND DELIVERY | Facility: HOSPITAL | Age: 29
End: 2019-12-19

## 2019-12-19 ENCOUNTER — ANESTHESIA (OUTPATIENT)
Dept: LABOR AND DELIVERY | Facility: HOSPITAL | Age: 29
End: 2019-12-19

## 2019-12-19 ENCOUNTER — HOSPITAL ENCOUNTER (INPATIENT)
Facility: HOSPITAL | Age: 29
LOS: 2 days | Discharge: HOME | End: 2019-12-21
Attending: OBSTETRICS & GYNECOLOGY | Admitting: OBSTETRICS & GYNECOLOGY

## 2019-12-19 DIAGNOSIS — O10.913 CHRONIC HYPERTENSION WITH EXACERBATION DURING PREGNANCY IN THIRD TRIMESTER: ICD-10-CM

## 2019-12-19 LAB
ABO GROUP BLD: NORMAL
AMPHET+METHAMPHET UR QL: NEGATIVE
AMPHETAMINES UR QL: NEGATIVE
BARBITURATES UR QL SCN: NEGATIVE
BENZODIAZ UR QL SCN: NEGATIVE
BLD GP AB SCN SERPL QL: NEGATIVE
BUPRENORPHINE SERPL-MCNC: NEGATIVE NG/ML
CANNABINOIDS SERPL QL: NEGATIVE
COCAINE UR QL: NEGATIVE
DEPRECATED RDW RBC AUTO: 54.3 FL (ref 37–54)
ERYTHROCYTE [DISTWIDTH] IN BLOOD BY AUTOMATED COUNT: 18.2 % (ref 12.3–15.4)
HCT VFR BLD AUTO: 35.7 % (ref 34–46.6)
HGB BLD-MCNC: 12 G/DL (ref 12–15.9)
Lab: NORMAL
MCH RBC QN AUTO: 27.4 PG (ref 26.6–33)
MCHC RBC AUTO-ENTMCNC: 33.6 G/DL (ref 31.5–35.7)
MCV RBC AUTO: 81.5 FL (ref 79–97)
METHADONE UR QL SCN: NEGATIVE
OPIATES UR QL: NEGATIVE
OXYCODONE UR QL SCN: NEGATIVE
PCP UR QL SCN: NEGATIVE
PLATELET # BLD AUTO: 220 10*3/MM3 (ref 140–450)
PMV BLD AUTO: 9.9 FL (ref 6–12)
PROPOXYPH UR QL: NEGATIVE
RBC # BLD AUTO: 4.38 10*6/MM3 (ref 3.77–5.28)
RH BLD: POSITIVE
T&S EXPIRATION DATE: NORMAL
TRICYCLICS UR QL SCN: NEGATIVE
WBC NRBC COR # BLD: 11.07 10*3/MM3 (ref 3.4–10.8)

## 2019-12-19 PROCEDURE — 94799 UNLISTED PULMONARY SVC/PX: CPT

## 2019-12-19 PROCEDURE — 51703 INSERT BLADDER CATH COMPLEX: CPT

## 2019-12-19 PROCEDURE — 25010000002 GENTAMICIN PER 80 MG: Performed by: OBSTETRICS & GYNECOLOGY

## 2019-12-19 PROCEDURE — 85027 COMPLETE CBC AUTOMATED: CPT | Performed by: OBSTETRICS & GYNECOLOGY

## 2019-12-19 PROCEDURE — 25010000002 KETOROLAC TROMETHAMINE PER 15 MG: Performed by: OBSTETRICS & GYNECOLOGY

## 2019-12-19 PROCEDURE — 80307 DRUG TEST PRSMV CHEM ANLYZR: CPT | Performed by: OBSTETRICS & GYNECOLOGY

## 2019-12-19 PROCEDURE — 25010000002 ONDANSETRON PER 1 MG: Performed by: NURSE ANESTHETIST, CERTIFIED REGISTERED

## 2019-12-19 PROCEDURE — 25010000002 PHENYLEPHRINE 10 MG/ML SOLUTION: Performed by: NURSE ANESTHETIST, CERTIFIED REGISTERED

## 2019-12-19 PROCEDURE — 25010000002 MORPHINE PER 10 MG: Performed by: NURSE ANESTHETIST, CERTIFIED REGISTERED

## 2019-12-19 PROCEDURE — 86850 RBC ANTIBODY SCREEN: CPT | Performed by: OBSTETRICS & GYNECOLOGY

## 2019-12-19 PROCEDURE — 94760 N-INVAS EAR/PLS OXIMETRY 1: CPT

## 2019-12-19 PROCEDURE — 86901 BLOOD TYPING SEROLOGIC RH(D): CPT | Performed by: OBSTETRICS & GYNECOLOGY

## 2019-12-19 PROCEDURE — 25010000002 KETOROLAC TROMETHAMINE PER 15 MG: Performed by: NURSE ANESTHETIST, CERTIFIED REGISTERED

## 2019-12-19 PROCEDURE — 86900 BLOOD TYPING SEROLOGIC ABO: CPT | Performed by: OBSTETRICS & GYNECOLOGY

## 2019-12-19 PROCEDURE — 59510 CESAREAN DELIVERY: CPT | Performed by: OBSTETRICS & GYNECOLOGY

## 2019-12-19 RX ORDER — MISOPROSTOL 200 UG/1
800 TABLET ORAL AS NEEDED
Status: DISCONTINUED | OUTPATIENT
Start: 2019-12-19 | End: 2019-12-19 | Stop reason: HOSPADM

## 2019-12-19 RX ORDER — ONDANSETRON 2 MG/ML
4 INJECTION INTRAMUSCULAR; INTRAVENOUS EVERY 6 HOURS PRN
Status: DISCONTINUED | OUTPATIENT
Start: 2019-12-19 | End: 2019-12-19 | Stop reason: HOSPADM

## 2019-12-19 RX ORDER — PROMETHAZINE HYDROCHLORIDE 12.5 MG/1
12.5 SUPPOSITORY RECTAL EVERY 6 HOURS PRN
Status: DISCONTINUED | OUTPATIENT
Start: 2019-12-19 | End: 2019-12-19 | Stop reason: HOSPADM

## 2019-12-19 RX ORDER — DIPHENHYDRAMINE HYDROCHLORIDE 50 MG/ML
25 INJECTION INTRAMUSCULAR; INTRAVENOUS EVERY 4 HOURS PRN
Status: DISCONTINUED | OUTPATIENT
Start: 2019-12-19 | End: 2019-12-21 | Stop reason: HOSPADM

## 2019-12-19 RX ORDER — SODIUM CHLORIDE 0.9 % (FLUSH) 0.9 %
3 SYRINGE (ML) INJECTION EVERY 12 HOURS SCHEDULED
Status: DISCONTINUED | OUTPATIENT
Start: 2019-12-19 | End: 2019-12-19 | Stop reason: HOSPADM

## 2019-12-19 RX ORDER — OXYTOCIN/0.9 % SODIUM CHLORIDE 30/500 ML
650 PLASTIC BAG, INJECTION (ML) INTRAVENOUS ONCE
Status: COMPLETED | OUTPATIENT
Start: 2019-12-19 | End: 2019-12-19

## 2019-12-19 RX ORDER — KETOROLAC TROMETHAMINE 30 MG/ML
30 INJECTION, SOLUTION INTRAMUSCULAR; INTRAVENOUS EVERY 6 HOURS
Status: COMPLETED | OUTPATIENT
Start: 2019-12-19 | End: 2019-12-20

## 2019-12-19 RX ORDER — OXYTOCIN/0.9 % SODIUM CHLORIDE 30/500 ML
85 PLASTIC BAG, INJECTION (ML) INTRAVENOUS ONCE
Status: DISCONTINUED | OUTPATIENT
Start: 2019-12-19 | End: 2019-12-19

## 2019-12-19 RX ORDER — TRISODIUM CITRATE DIHYDRATE AND CITRIC ACID MONOHYDRATE 500; 334 MG/5ML; MG/5ML
30 SOLUTION ORAL ONCE
Status: COMPLETED | OUTPATIENT
Start: 2019-12-19 | End: 2019-12-19

## 2019-12-19 RX ORDER — PRENATAL VIT/IRON FUM/FOLIC AC 27MG-0.8MG
1 TABLET ORAL DAILY
Status: DISCONTINUED | OUTPATIENT
Start: 2019-12-20 | End: 2019-12-21 | Stop reason: HOSPADM

## 2019-12-19 RX ORDER — PROMETHAZINE HYDROCHLORIDE 12.5 MG/1
12.5 TABLET ORAL EVERY 6 HOURS PRN
Status: DISCONTINUED | OUTPATIENT
Start: 2019-12-19 | End: 2019-12-19 | Stop reason: HOSPADM

## 2019-12-19 RX ORDER — KETOROLAC TROMETHAMINE 30 MG/ML
INJECTION, SOLUTION INTRAMUSCULAR; INTRAVENOUS AS NEEDED
Status: DISCONTINUED | OUTPATIENT
Start: 2019-12-19 | End: 2019-12-19 | Stop reason: SURG

## 2019-12-19 RX ORDER — PROMETHAZINE HYDROCHLORIDE 25 MG/ML
12.5 INJECTION, SOLUTION INTRAMUSCULAR; INTRAVENOUS EVERY 4 HOURS PRN
Status: DISCONTINUED | OUTPATIENT
Start: 2019-12-19 | End: 2019-12-21 | Stop reason: HOSPADM

## 2019-12-19 RX ORDER — PRENATAL VIT/IRON FUM/FOLIC AC 27MG-0.8MG
1 TABLET ORAL DAILY
Status: DISCONTINUED | OUTPATIENT
Start: 2019-12-19 | End: 2019-12-19

## 2019-12-19 RX ORDER — SODIUM CHLORIDE, SODIUM LACTATE, POTASSIUM CHLORIDE, CALCIUM CHLORIDE 600; 310; 30; 20 MG/100ML; MG/100ML; MG/100ML; MG/100ML
INJECTION, SOLUTION INTRAVENOUS
Status: COMPLETED
Start: 2019-12-19 | End: 2019-12-19

## 2019-12-19 RX ORDER — DIPHENHYDRAMINE HCL 25 MG
25 CAPSULE ORAL EVERY 4 HOURS PRN
Status: DISCONTINUED | OUTPATIENT
Start: 2019-12-19 | End: 2019-12-19

## 2019-12-19 RX ORDER — BISACODYL 5 MG/1
10 TABLET, DELAYED RELEASE ORAL DAILY PRN
Status: DISCONTINUED | OUTPATIENT
Start: 2019-12-19 | End: 2019-12-21 | Stop reason: HOSPADM

## 2019-12-19 RX ORDER — OXYTOCIN/0.9 % SODIUM CHLORIDE 30/500 ML
650 PLASTIC BAG, INJECTION (ML) INTRAVENOUS ONCE
Status: DISCONTINUED | OUTPATIENT
Start: 2019-12-19 | End: 2019-12-19

## 2019-12-19 RX ORDER — LIDOCAINE HYDROCHLORIDE 10 MG/ML
5 INJECTION, SOLUTION EPIDURAL; INFILTRATION; INTRACAUDAL; PERINEURAL AS NEEDED
Status: DISCONTINUED | OUTPATIENT
Start: 2019-12-19 | End: 2019-12-19 | Stop reason: HOSPADM

## 2019-12-19 RX ORDER — ACETAMINOPHEN 500 MG
1000 TABLET ORAL EVERY 8 HOURS
Status: DISCONTINUED | OUTPATIENT
Start: 2019-12-19 | End: 2019-12-21 | Stop reason: HOSPADM

## 2019-12-19 RX ORDER — ALUMINA, MAGNESIA, AND SIMETHICONE 2400; 2400; 240 MG/30ML; MG/30ML; MG/30ML
15 SUSPENSION ORAL EVERY 4 HOURS PRN
Status: DISCONTINUED | OUTPATIENT
Start: 2019-12-19 | End: 2019-12-21 | Stop reason: HOSPADM

## 2019-12-19 RX ORDER — METHYLERGONOVINE MALEATE 0.2 MG/ML
200 INJECTION INTRAVENOUS ONCE AS NEEDED
Status: DISCONTINUED | OUTPATIENT
Start: 2019-12-19 | End: 2019-12-19 | Stop reason: HOSPADM

## 2019-12-19 RX ORDER — CARBOPROST TROMETHAMINE 250 UG/ML
250 INJECTION, SOLUTION INTRAMUSCULAR AS NEEDED
Status: DISCONTINUED | OUTPATIENT
Start: 2019-12-19 | End: 2019-12-19 | Stop reason: HOSPADM

## 2019-12-19 RX ORDER — OXYCODONE HYDROCHLORIDE 5 MG/1
5 TABLET ORAL EVERY 4 HOURS PRN
Status: DISCONTINUED | OUTPATIENT
Start: 2019-12-19 | End: 2019-12-21 | Stop reason: HOSPADM

## 2019-12-19 RX ORDER — SODIUM CHLORIDE, SODIUM LACTATE, POTASSIUM CHLORIDE, CALCIUM CHLORIDE 600; 310; 30; 20 MG/100ML; MG/100ML; MG/100ML; MG/100ML
INJECTION, SOLUTION INTRAVENOUS CONTINUOUS PRN
Status: DISCONTINUED | OUTPATIENT
Start: 2019-12-19 | End: 2019-12-19 | Stop reason: SURG

## 2019-12-19 RX ORDER — ONDANSETRON 2 MG/ML
4 INJECTION INTRAMUSCULAR; INTRAVENOUS ONCE AS NEEDED
Status: DISCONTINUED | OUTPATIENT
Start: 2019-12-19 | End: 2019-12-19

## 2019-12-19 RX ORDER — LABETALOL 100 MG/1
100 TABLET, FILM COATED ORAL 2 TIMES DAILY
Status: DISCONTINUED | OUTPATIENT
Start: 2019-12-19 | End: 2019-12-21 | Stop reason: HOSPADM

## 2019-12-19 RX ORDER — MORPHINE SULFATE 1 MG/ML
INJECTION, SOLUTION EPIDURAL; INTRATHECAL; INTRAVENOUS AS NEEDED
Status: DISCONTINUED | OUTPATIENT
Start: 2019-12-19 | End: 2019-12-19 | Stop reason: SURG

## 2019-12-19 RX ORDER — PROMETHAZINE HYDROCHLORIDE 25 MG/ML
6.25 INJECTION, SOLUTION INTRAMUSCULAR; INTRAVENOUS
Status: DISCONTINUED | OUTPATIENT
Start: 2019-12-19 | End: 2019-12-19 | Stop reason: HOSPADM

## 2019-12-19 RX ORDER — OXYTOCIN/0.9 % SODIUM CHLORIDE 30/500 ML
PLASTIC BAG, INJECTION (ML) INTRAVENOUS
Status: COMPLETED
Start: 2019-12-19 | End: 2019-12-19

## 2019-12-19 RX ORDER — LANOLIN 100 %
OINTMENT (GRAM) TOPICAL
Status: DISCONTINUED | OUTPATIENT
Start: 2019-12-19 | End: 2019-12-21 | Stop reason: HOSPADM

## 2019-12-19 RX ORDER — PHENYLEPHRINE HYDROCHLORIDE 10 MG/ML
INJECTION INTRAVENOUS AS NEEDED
Status: DISCONTINUED | OUTPATIENT
Start: 2019-12-19 | End: 2019-12-19 | Stop reason: SURG

## 2019-12-19 RX ORDER — SODIUM CHLORIDE 0.9 % (FLUSH) 0.9 %
3-10 SYRINGE (ML) INJECTION AS NEEDED
Status: DISCONTINUED | OUTPATIENT
Start: 2019-12-19 | End: 2019-12-19 | Stop reason: HOSPADM

## 2019-12-19 RX ORDER — OXYCODONE HYDROCHLORIDE 5 MG/1
10 TABLET ORAL EVERY 4 HOURS PRN
Status: DISCONTINUED | OUTPATIENT
Start: 2019-12-19 | End: 2019-12-21 | Stop reason: HOSPADM

## 2019-12-19 RX ORDER — DIPHENHYDRAMINE HCL 25 MG
25 CAPSULE ORAL EVERY 4 HOURS PRN
Status: DISCONTINUED | OUTPATIENT
Start: 2019-12-19 | End: 2019-12-21 | Stop reason: HOSPADM

## 2019-12-19 RX ORDER — BUPIVACAINE HYDROCHLORIDE 7.5 MG/ML
INJECTION, SOLUTION EPIDURAL; RETROBULBAR
Status: COMPLETED | OUTPATIENT
Start: 2019-12-19 | End: 2019-12-19

## 2019-12-19 RX ORDER — DOCUSATE SODIUM 100 MG/1
100 CAPSULE, LIQUID FILLED ORAL 2 TIMES DAILY
Status: DISCONTINUED | OUTPATIENT
Start: 2019-12-19 | End: 2019-12-21 | Stop reason: HOSPADM

## 2019-12-19 RX ORDER — ONDANSETRON 4 MG/1
4 TABLET, FILM COATED ORAL EVERY 6 HOURS PRN
Status: DISCONTINUED | OUTPATIENT
Start: 2019-12-19 | End: 2019-12-19 | Stop reason: HOSPADM

## 2019-12-19 RX ORDER — NALOXONE HCL 0.4 MG/ML
0.1 VIAL (ML) INJECTION
Status: DISCONTINUED | OUTPATIENT
Start: 2019-12-19 | End: 2019-12-21 | Stop reason: HOSPADM

## 2019-12-19 RX ORDER — SIMETHICONE 80 MG
80 TABLET,CHEWABLE ORAL 4 TIMES DAILY PRN
Status: DISCONTINUED | OUTPATIENT
Start: 2019-12-19 | End: 2019-12-21 | Stop reason: HOSPADM

## 2019-12-19 RX ORDER — NALOXONE HCL 0.4 MG/ML
0.2 VIAL (ML) INJECTION
Status: DISCONTINUED | OUTPATIENT
Start: 2019-12-19 | End: 2019-12-21 | Stop reason: HOSPADM

## 2019-12-19 RX ORDER — CLINDAMYCIN PHOSPHATE 900 MG/50ML
900 INJECTION INTRAVENOUS ONCE
Status: COMPLETED | OUTPATIENT
Start: 2019-12-19 | End: 2019-12-19

## 2019-12-19 RX ORDER — OXYTOCIN/0.9 % SODIUM CHLORIDE 30/500 ML
85 PLASTIC BAG, INJECTION (ML) INTRAVENOUS ONCE
Status: COMPLETED | OUTPATIENT
Start: 2019-12-19 | End: 2019-12-19

## 2019-12-19 RX ORDER — ONDANSETRON 2 MG/ML
INJECTION INTRAMUSCULAR; INTRAVENOUS AS NEEDED
Status: DISCONTINUED | OUTPATIENT
Start: 2019-12-19 | End: 2019-12-19 | Stop reason: SURG

## 2019-12-19 RX ADMIN — BUPIVACAINE HYDROCHLORIDE 1.6 ML: 7.5 INJECTION, SOLUTION EPIDURAL; RETROBULBAR at 07:18

## 2019-12-19 RX ADMIN — OXYTOCIN-SODIUM CHLORIDE 0.9% IV SOLN 30 UNIT/500ML 50 ML: 30-0.9/5 SOLUTION at 08:09

## 2019-12-19 RX ADMIN — LABETALOL HYDROCHLORIDE 100 MG: 100 TABLET, FILM COATED ORAL at 09:18

## 2019-12-19 RX ADMIN — DOCUSATE SODIUM 100 MG: 100 CAPSULE, LIQUID FILLED ORAL at 14:13

## 2019-12-19 RX ADMIN — DOCUSATE SODIUM 100 MG: 100 CAPSULE, LIQUID FILLED ORAL at 20:58

## 2019-12-19 RX ADMIN — KETOROLAC TROMETHAMINE 30 MG: 30 INJECTION, SOLUTION INTRAMUSCULAR; INTRAVENOUS at 19:38

## 2019-12-19 RX ADMIN — ACETAMINOPHEN 1000 MG: 500 TABLET ORAL at 09:18

## 2019-12-19 RX ADMIN — OXYTOCIN-SODIUM CHLORIDE 0.9% IV SOLN 30 UNIT/500ML 50 ML: 30-0.9/5 SOLUTION at 07:59

## 2019-12-19 RX ADMIN — LABETALOL HYDROCHLORIDE 100 MG: 100 TABLET, FILM COATED ORAL at 20:58

## 2019-12-19 RX ADMIN — OXYTOCIN 85 ML/HR: 10 INJECTION INTRAVENOUS at 10:55

## 2019-12-19 RX ADMIN — OXYTOCIN-SODIUM CHLORIDE 0.9% IV SOLN 30 UNIT/500ML 50 ML: 30-0.9/5 SOLUTION at 08:14

## 2019-12-19 RX ADMIN — SODIUM CHLORIDE, POTASSIUM CHLORIDE, SODIUM LACTATE AND CALCIUM CHLORIDE 1000 ML: 600; 310; 30; 20 INJECTION, SOLUTION INTRAVENOUS at 05:36

## 2019-12-19 RX ADMIN — GENTAMICIN SULFATE 420 MG: 40 INJECTION, SOLUTION INTRAMUSCULAR; INTRAVENOUS at 07:34

## 2019-12-19 RX ADMIN — CLINDAMYCIN IN 5 PERCENT DEXTROSE 600 MG: 18 INJECTION, SOLUTION INTRAVENOUS at 07:20

## 2019-12-19 RX ADMIN — OXYTOCIN-SODIUM CHLORIDE 0.9% IV SOLN 30 UNIT/500ML 50 ML: 30-0.9/5 SOLUTION at 08:04

## 2019-12-19 RX ADMIN — PHENYLEPHRINE HYDROCHLORIDE 100 MCG: 10 INJECTION INTRAVENOUS at 07:23

## 2019-12-19 RX ADMIN — KETOROLAC TROMETHAMINE 30 MG: 30 INJECTION, SOLUTION INTRAMUSCULAR at 08:30

## 2019-12-19 RX ADMIN — OXYTOCIN-SODIUM CHLORIDE 0.9% IV SOLN 30 UNIT/500ML 75 ML: 30-0.9/5 SOLUTION at 07:54

## 2019-12-19 RX ADMIN — SODIUM CITRATE AND CITRIC ACID MONOHYDRATE 30 ML: 500; 334 SOLUTION ORAL at 06:28

## 2019-12-19 RX ADMIN — ACETAMINOPHEN 1000 MG: 500 TABLET ORAL at 20:58

## 2019-12-19 RX ADMIN — OXYCODONE HYDROCHLORIDE 10 MG: 5 TABLET ORAL at 22:20

## 2019-12-19 RX ADMIN — LEVOTHYROXINE SODIUM 175 MCG: 150 TABLET ORAL at 09:18

## 2019-12-19 RX ADMIN — KETOROLAC TROMETHAMINE 30 MG: 30 INJECTION, SOLUTION INTRAMUSCULAR; INTRAVENOUS at 14:13

## 2019-12-19 RX ADMIN — OXYTOCIN-SODIUM CHLORIDE 0.9% IV SOLN 30 UNIT/500ML 50 ML: 30-0.9/5 SOLUTION at 08:19

## 2019-12-19 RX ADMIN — ONDANSETRON HYDROCHLORIDE 8 MG: 2 INJECTION INTRAMUSCULAR; INTRAVENOUS at 07:21

## 2019-12-19 RX ADMIN — Medication 0.25 MG: at 07:18

## 2019-12-19 RX ADMIN — PHENYLEPHRINE HYDROCHLORIDE 50 MCG: 10 INJECTION INTRAVENOUS at 07:30

## 2019-12-19 RX ADMIN — SODIUM CHLORIDE, POTASSIUM CHLORIDE, SODIUM LACTATE AND CALCIUM CHLORIDE: 600; 310; 30; 20 INJECTION, SOLUTION INTRAVENOUS at 07:08

## 2019-12-19 RX ADMIN — SODIUM CHLORIDE, POTASSIUM CHLORIDE, SODIUM LACTATE AND CALCIUM CHLORIDE: 600; 310; 30; 20 INJECTION, SOLUTION INTRAVENOUS at 07:12

## 2019-12-19 NOTE — PLAN OF CARE
Problem: Patient Care Overview  Goal: Plan of Care Review  Outcome: Ongoing (interventions implemented as appropriate)  Flowsheets (Taken 12/19/2019 5918)  Progress: improving  Plan of Care Reviewed With: patient; family  Outcome Summary: VSS, denies pain at this time, adequate output, fundus firm, rubra light, has stood at bedside

## 2019-12-19 NOTE — PLAN OF CARE
Problem: Patient Care Overview  Goal: Plan of Care Review  Outcome: Ongoing (interventions implemented as appropriate)  Flowsheets (Taken 12/19/2019 5517)  Plan of Care Reviewed With: patient; spouse; family  Outcome Summary: vss, clipped, rhea wiped, bolus going, bictra given, leg pumps on

## 2019-12-19 NOTE — H&P (VIEW-ONLY)
Obstetric History and Physical    Chief complaint: Worsening high blood pressure          Patient is a 29 y.o. female  currently at 36w6d, who presents for repeat blood pressure check today.  Patient's blood pressures have been slowly getting worse and she has had 1-2 severe range blood pressures in the clinic along with multiple at home.  Today her blood pressures were mild range.  However I feel the patient has an exacerbation of her chronic hypertension and is starting towards preeclampsia at this point.  Patient does have a mild headache today but no other evidence of preeclampsia with severe features at this time.  Patient states positive fetal movement, denies loss of fluid, vaginal bleeding or contractions at this time.  Given the worsening of the patient's hypertension and concern for early preeclampsia I plan to move forward with delivery at 37 weeks and 0 days for maternal and fetal wellbeing.    The risks, benefits. and alternatives to  section were discussed.  The risks that were discussed included, but were not limited to, pain, infection, bleeding, hemorrhage,  injury to the bowel, bladder, uterus, tubes, ovaries, or baby which could require further surgery or prolonged hospitalization.  Remote possibility of hysterectomy and/or salpingo-ophorectomy. Remote possibility of death of baby and/or mother. I discussed the risk of bleeding with the patient and possible risk of blood transfusion.  Blood products carry risk of HIV, infection, hepatitis, and transfusion reaction. Patient is willing to accept blood products. The patient understands that SCD's will be placed on her legs to try and reduce the risk of clot formation in her legs.  She understands that we will have early ambulation to also reduce the risk of blood clot formation and to reduce the risk of pneumonia.  She will be given antibiotics prior to her surgery to help decrease the risk for infection.  All questions were  answered. Patient express understanding and desires to proceed with surgery.      Her prenatal care is complicated by chronic hypertension now with exacerbation and concern for early preeclampsia that could affect maternal and fetal wellbeing     Obstetric History   #: 1, Date: 17, Sex: Female, Weight: 3515 g (7 lb 12 oz), GA: 37w2d, Delivery: , Low Transverse, Apgar1: 7, Apgar5: 9, Living: Living, Birth Comments: None    #: 2, Date: None, Sex: None, Weight: None, GA: None, Delivery: None, Apgar1: None, Apgar5: None, Living: None, Birth Comments: None       The following portions of the patients history were reviewed and updated as appropriate: current medications, allergies, past medical history, past surgical history, past family history, past social history and problem list .       Prenatal Information:  Prenatal Results     POC Urine Glucose/Protein     Test Value Reference Range Date Time    Urine Glucose        Urine Protein              Initial Prenatal Labs     Test Value Reference Range Date Time    Hemoglobin        Hematocrit        Platelets 220 10*3/mm3 140 - 450 19 0523      219 10*3/mm3 140 - 450 19 1006      204 10*3/mm3 140 - 450 19 0911      217 10*3/mm3 140 - 450 10/25/19 1332      225 10*3/mm3 140 - 450 10/08/19 0915    Rubella IgG 45.2 IU/mL 0.0 - 9.9 17 1155      Immune  Immune 17 1155    Hepatitis B SAg Negative  Negative 17 1155    Hepatitis C Ab Negative  Negative 17 1155    RPR Non-Reactive  Non-Reactive 17 1155    ABO O   19 1006    Rh Positive   19 1006    Antibody Screen        HIV Negative  Negative 17 1155    Urine Culture No growth at 24 hours   17 1156    Gonorrhea Negative  Negative 19 0903    Chlamydia Negative  Negative 19 0903    TSH 0.778 uIU/mL 0.270 - 4.200 19 0911      0.817 uIU/mL 0.270 - 4.200 10/08/19 0915      0.923 uIU/mL 0.270 - 4.200 19 1004      0.983 mIU/mL  0.270 - 4.200 08/06/19 0903      1.480 mIU/mL 0.270 - 4.200 06/18/19 1025      2.430 mIU/mL 0.270 - 4.200 05/09/19 1025          2nd and 3rd Trimester     Test Value Reference Range Date Time    Hemoglobin (repeated) 12.0 g/dL 12.0 - 15.9 12/19/19 0523      12.0 g/dL 12.0 - 15.9 12/17/19 1006      10.9 g/dL 12.0 - 15.9 11/18/19 0911      9.9 g/dL 12.0 - 15.9 10/25/19 1332      9.7 g/dL 12.0 - 15.9 10/08/19 0915    Hematocrit (repeated) 35.7 % 34.0 - 46.6 12/19/19 0523      35.7 % 34.0 - 46.6 12/17/19 1006      34.0 % 34.0 - 46.6 11/18/19 0911      30.9 % 34.0 - 46.6 10/25/19 1332      29.7 % 34.0 - 46.6 10/08/19 0915     mg/dL 74 - 180 10/18/19 0854      159 mg/dL 60 - 140 10/08/19 0915    Antibody Screen (repeated) Negative   12/17/19 1006    GTT Fasting        GTT 1 Hr        GTT 2 Hr        GTT 3 Hr        Group B Strep              Drug Screening     Test Value Reference Range Date Time    Amphetamine Screen Negative  Negative 12/19/19 0523      Negative  Negative 12/17/19 1006    Barbiturate Screen Negative  Negative 12/19/19 0523      Negative  Negative 12/17/19 1006    Benzodiazepine Screen Negative  Negative 12/19/19 0523      Negative  Negative 12/17/19 1006    Methadone Screen Negative  Negative 12/19/19 0523      Negative  Negative 12/17/19 1006    Phencyclidine Screen Negative  Negative 12/19/19 0523      Negative  Negative 12/17/19 1006    Opiates Screen Negative  Negative 12/19/19 0523      Negative  Negative 12/17/19 1006    THC Screen Negative  Negative 12/19/19 0523      Negative  Negative 12/17/19 1006    Cocaine Screen Negative  Negative 12/19/19 0523      Negative  Negative 12/17/19 1006    Propoxyphene Screen Negative  Negative 12/19/19 0523      Negative  Negative 12/17/19 1006    Buprenorphine Screen Negative  Negative 12/19/19 0523      Negative  Negative 12/17/19 1006    Methamphetamine Screen Negative  Negative 12/19/19 0523      Negative  Negative 12/17/19 1006    Oxycodone Screen  Negative  Negative 12/19/19 0523      Negative  Negative 12/17/19 1006    Tricyclic Antidepressants Screen Negative  Negative 12/19/19 0523      Negative  Negative 12/17/19 1006          Other (Risk screening)     Test Value Reference Range Date Time    Varicella IgG        Parvovirus IgG        CMV IgG        Cystic Fibrosis        Hemoglobin electrophoresis        NIPT        MSAFP-4        AFP (for NTD only)                  External Prenatal Results     Pregnancy Outside Results - Transcribed From Office Records - See Scanned Records For Details     Test Value Date Time    Hgb 12.0 g/dL 12/19/19 0523      12.0 g/dL 12/17/19 1006      10.9 g/dL 11/18/19 0911      9.9 g/dL 10/25/19 1332      9.7 g/dL 10/08/19 0915    Hct 35.7 % 12/19/19 0523      35.7 % 12/17/19 1006      34.0 % 11/18/19 0911      30.9 % 10/25/19 1332      29.7 % 10/08/19 0915    ABO O  12/17/19 1006    Rh Positive  12/17/19 1006    Antibody Screen Negative  12/17/19 1006    Glucose Fasting GTT       Glucose Tolerance Test 1 hour       Glucose Tolerance Test 3 hour       Gonorrhea (discrete) Negative  08/06/19 0903    Chlamydia (discrete) Negative  08/06/19 0903    RPR Non-Reactive  05/26/17 1155    VDRL       Syphilis Antibody       Rubella 45.2 IU/mL 05/26/17 1155      Immune  05/26/17 1155    HBsAg Negative  05/26/17 1155    Herpes Simplex Virus PCR       Herpes Simplex VIrus Culture       HIV Negative  05/26/17 1155    Hep C RNA Quant PCR       Hep C Antibody Negative  05/26/17 1155    AFP 17.2 ng/mL 07/21/17 0955    Group B Strep Positive  12/11/17 0952    GBS Susceptibility to Clindamycin       GBS Susceptibility to Erythromycin       Fetal Fibronectin       Genetic Testing, Maternal Blood             Drug Screening     Test Value Date Time    Urine Drug Screen       Amphetamine Screen Negative  12/19/19 0523      Negative  12/17/19 1006    Barbiturate Screen Negative  12/19/19 0523      Negative  12/17/19 1006    Benzodiazepine Screen  Negative  19 0523      Negative  19 1006    Methadone Screen Negative  19 0523      Negative  19 1006    Phencyclidine Screen Negative  19 0523      Negative  19 1006    Opiates Screen Negative  19 0523      Negative  19 1006    THC Screen Negative  19 0523      Negative  19 1006    Cocaine Screen       Propoxyphene Screen Negative  19 0523      Negative  19 1006    Buprenorphine Screen Negative  19 0523      Negative  19 1006    Methamphetamine Screen       Oxycodone Screen Negative  19 0523      Negative  19 1006    Tricyclic Antidepressants Screen Negative  19 0523      Negative  19 1006                 Past OB History:     OB History    Para Term  AB Living   2 1 1 0 0 1   SAB TAB Ectopic Molar Multiple Live Births   0 0 0 0 0 1      # Outcome Date GA Lbr Ari/2nd Weight Sex Delivery Anes PTL Lv   2 Current            1 Term 17 37w2d  3515 g (7 lb 12 oz) F CS-LTranv EPI N VITA      Complications: Fetal Intolerance      Name: TOREY CAMARA      Apgar1: 7  Apgar5: 9        ALLERGIES:     Allergies   Allergen Reactions   • Cefzil [Cefprozil] Hives   • Celexa [Citalopram]      Patient states that she is not allergic to this med. It causes fatigue   • Eggs Or Egg-Derived Products Diarrhea   • Influenza Vaccines      Migraines/ vomiting has reaction 2015- not in past         Home Medications:     Prior to Admission medications    Medication Sig Start Date End Date Taking? Authorizing Provider   aspirin 81 MG chewable tablet Chew 81 mg Daily.   Yes Provider, MD Tess   cyanocobalamin 1000 MCG/ML injection INJECT 1 EACH AS DIRECTED TAKE AS DIRECTED. DAILY X 5, THEN WEEKLY FOR THE DURATION OF PREGNANCY 19  Yes Ayde Rodriguez MD   labetalol (NORMODYNE) 100 MG tablet Take 1 tablet by mouth 2 (Two) Times a Day. 19  Yes Vazquez Davis, DO   levothyroxine  (SYNTHROID, LEVOTHROID) 175 MCG tablet Take 1 tablet by mouth Daily. 19  Yes Beto Aquino APRN   Prenatal Vit-Fe Fumarate-FA (PRENATAL VITAMIN) 27-0.8 MG tablet Take 1 tablet by mouth Daily.   Yes Provider, MD Tess       Past Medical History: Past Medical History:   Diagnosis Date   • Abdominal pain     generalized cramping after eating wheat type products   • Acquired hypothyroidism     Secondary to Hashimoto's thyroiditis   • Back pain affecting pregnancy in second trimester    • Dysmenorrhea    • Endogenous obesity    • Excessive or frequent menstruation    • Hashimoto's thyroiditis    • Headache    • Hypertension 2017    gestational hypertension   • Myopia    • Vitamin D deficiency       Past Surgical History Past Surgical History:   Procedure Laterality Date   •  SECTION N/A 2017    Procedure:  SECTION PRIMARY;  Surgeon: Jayne Thibodeaux MD;  Location: [facility] LABOR DELIVERY;  Service:    • INJECTION OF MEDICATION  2013    Depo Provera (medroxyprogesterone acetate)   • INJECTION OF MEDICATION  2012    Kenalog x2   • INJECTION OF MEDICATION  2015    Toradol   • INJECTION OF MEDICATION  2015    Zofran   • WISDOM TOOTH EXTRACTION        Family History: Family History   Problem Relation Age of Onset   • Asthma Other    • Hypertension Other    • Other Other         Thyroid Disorder, Depressive Disorder, Smoking Tobacco, Anxiety   • Rheum arthritis Mother    • Hypertension Father    • Heart disease Maternal Grandfather    • Hypertension Maternal Grandfather    • Osteoporosis Paternal Grandmother    • Stroke Paternal Grandmother    • Anemia Paternal Grandmother    • Pulmonary embolism Maternal Grandmother       Social History:  reports that she has never smoked. She has never used smokeless tobacco.   reports that she does not drink alcohol.   reports that she does not use drugs.        Review of Systems                                                                                                                   Neuro no history of brain tumor    HENT no history of ear tumors    Eye no history of retinal tumors    Pulmonary no history of lung tumors    Cardiac no history of cardiac tumors    GI: No history of small bowel tumors    Musculoskeletal: No history of skeletal muscle tumors    Endocrine: No history of adrenal tumors    Lymphatic: No history of Hodgkin's disease    Renal: No history of renal cancer      Objective     Documented Vitals    12/18/19 1203   BP: 152/83   Weight: 125 kg (275 lb)       OBGyn Exam  Constitutional: Appears to be in no acute distress; Eyes: sclera normal; Endocrine system: thyroid palpate is normal; Pulmonary system: lungs clear; Cardiovascular system: heart regular rate and rhythm; Gastrointestinal system: abdomen soft nontender, active bowel sounds; Urologic system: CVA negative; Psychiatric: appropriate insight; Neurologic: gait within normal limits.  Cervix: Unchecked.  Fetal heart rate: 130 bpm, fundal height 36 cm.      Last Labs  Lab Results   Component Value Date    WBC 11.07 (H) 12/19/2019    RBC 4.38 12/19/2019    HGB 12.0 12/19/2019    HCT 35.7 12/19/2019    MCV 81.5 12/19/2019    MCH 27.4 12/19/2019    MCHC 33.6 12/19/2019    RDW 18.2 (H) 12/19/2019    RDWSD 54.3 (H) 12/19/2019    MPV 9.9 12/19/2019     12/19/2019        Lab Results   Component Value Date    GLUCOSE 116 (H) 12/17/2019    BUN 5 (L) 12/17/2019    CREATININE 0.45 (L) 12/17/2019     (L) 12/17/2019    K 4.0 12/17/2019     12/17/2019    CO2 20.0 (L) 12/17/2019    CALCIUM 8.9 12/17/2019    PROTEINTOT 6.6 12/17/2019    ALBUMIN 3.30 (L) 12/17/2019    ALT 23 12/17/2019    AST 17 12/17/2019    ALKPHOS 121 (H) 12/17/2019    BILITOT 0.3 12/17/2019    EGFRIFNONA >150 12/17/2019    GLOB 3.3 12/17/2019    AGRATIO 1.0 12/17/2019    BCR 11.1 12/17/2019    ANIONGAP 11.0 12/17/2019       No results found for: HCGQUAL      Assessment/Plan:  1. 29  y.o.  36w6d.  Patient with chronic hypertension now with exacerbation and difficulty to control, with concerns for progression to preeclampsia, no clear evidence of severe features although patient did have a mild headache.  Plan for patient to undergo  on 37 weeks and 0 days, which will be tomorrow.  Patient overall stable at this point.  Plan for routine postoperative care.  Patient to return sooner if headache worsens, vision changes or any other worsening symptoms.      Jerrica Marquez and I have discussed pain goals for this hospitalization after reviewing her current clinical condition, medical history and prior pain experiences.  The goal is to keep her pain level tolerable.  To help achieve this, I plan to spinal for anesthesia with oral pain medications after surgery for pain control.           This document has been electronically signed by Vazquez Davis DO on 2019 6:02 AM

## 2019-12-19 NOTE — ANESTHESIA PREPROCEDURE EVALUATION
Anesthesia Evaluation     Patient summary reviewed and Nursing notes reviewed   NPO Solid Status: > 8 hours  NPO Liquid Status: > 8 hours           Airway   Mallampati: III  TM distance: <3 FB  Neck ROM: full  Possible difficult intubation  Dental - normal exam     Pulmonary - normal exam   Cardiovascular - normal exam    (+) hypertension,       Neuro/Psych  (+) headaches,     GI/Hepatic/Renal/Endo    (+) obesity, morbid obesity,      Musculoskeletal     Abdominal   (+) obese,    Substance History      OB/GYN    (+) Pregnant, pregnancy induced hypertension        Other                        Anesthesia Plan    ASA 3     spinal       Anesthetic plan, all risks, benefits, and alternatives have been provided, discussed and informed consent has been obtained with: patient.    Plan discussed with CRNA.

## 2019-12-19 NOTE — L&D DELIVERY NOTE
Patient underwent repeat low transverse  section for difficult to control blood pressure and intermittent severe range blood pressures.  Please see dictation for details.

## 2019-12-19 NOTE — OP NOTE
Section Procedure Note    Jerrica Marquez    2019     Indications: Chronic hypertension on medications with worsening blood pressures difficult to control patient with intermittent severe range blood pressures    Pre-operative Diagnosis: 29-year-old G2, P1 with chronic hypertension on labetalol now with worsening blood pressure control with intermittent severe range blood pressures.  Desiring repeat low transverse  section.  Given worsening and difficult to control blood pressures I felt that delivery at 37 weeks was warranted as likely preeclampsia was starting.    Post-operative Diagnosis: Same as above    PROCEDURES PERFORMED: Procedure(s):   SECTION REPEAT    SURGEON: Vazquez Davis DO FACOG    ASSISTANT: Emily Chavez CSA    STAFF:   Circulator: Arian Finch RN  Scrub Person: Ramonita Jung & AVIS Nurse: Komal Mckeon RN; Hermila Chavez RN  Assistant: Emily Chavez CSA    ANESTHESIA: Spinal    ANESTHESIA STAFF:  CRNA: Sylvester Mcclelland CRNA  Student Nurse Anesthetist: Mahnaz Basilio SRNA    Findings: Multiple bladder adhesions noted on the uterus.  Omental adhesions also noted on the uterus.  Normal-appearing tubes and ovaries.  Female infant in the cephalic presentation.    Birth Information  YOB: 2018   Time of birth: 7:53 AM   Delivering clinician: Vazquez Davis   Sex: female   Delivery type: , Low Transverse   Breech type (if applicable):     Observed anomalies/comments:         Estimated Blood Loss: See nursing note quantitative blood loss           Drains: August                 Specimens: None               Complications:  None; patient tolerated the procedure well.           Disposition: Mother Baby Unit           Condition: stable    Procedure Details     After obtaining informed consent, the patient was taken to the operating room where epidural anesthesia was found to be adequate. Preoperative antibiotics were  given.  A August catheter and bilateral sequential compression devices were placed.  She was then prepped and draped in the normal sterile fashion in the dorsal supine position with a left lateral tilt. A final time out was performed. A Pfannenstiel skin incision was then made with the scalpel and carried through to the underlying layer of fascia.  The fascia was incised in the midline and the incision extended laterally with Tuttle scissors.  The rectus muscles were then  in the midline, and the peritoneum was entered digitally.   The peritoneal incision was then extended superiorly and inferiorly multiple bladder adhesions were noted which were taken down sharply with Metzenbaum scissors.  A bladder flap was created and the bladder was pushed out of the operative field.  A bladder blade was placed and a large Laura retractor was used to visualize the uterus..    The lower uterine segment scored in a transverse fashion with the scalpel.  The uterus was then entered bluntly and the incision extended w/ traction.  The bladder blade was removed and the infant’s head was elevated to the level of the incision.  Fundal pressure was applied. The head was delivered atraumatically in OA position.  The anterior shoulder, posterior shoulder, and corpus were delivered without difficulty. The infant was handed off to waiting care team.      The placenta was then removed manually, the uterus exteriorized, and cleared of all clots and debris.  An omental adhesion was noted to the fundus of the uterus which was taken down with Bovie cautery.  The uterine incision was repaired with 0 Vicryl in a running, locking fashion.  An imbricating layer was then performed with #1 chromic.  The uterine incision was inspected and hemostasis was noted. Posterior cul-de-sac was suctioned and uterus returned to the abdomen.  The gutters were cleared of all clots and irrigated with excellent hemostasis noted.  The fascia was reapproximated  with looped PDS in a running fashion. Kaya's fascia was closed with 3-0 plain gut in a running fashion. The skin was closed using 3-0 Monocryl suture.    The patient tolerated the procedure well.  Sponge, lap, and needle counts were correct times two. Vaginal sweep was completed.  The patient was taken to the recovery room in stable condition.      This document has been electronically signed by Vazquez Davis DO on December 19, 2019 8:34 AM

## 2019-12-19 NOTE — PLAN OF CARE
Vss. Ff -1 light bleeding. August and scd's. Pain controlled with duramorph and po tylenol. Tolerating regular diet.

## 2019-12-19 NOTE — PROGRESS NOTES
Obstetric History and Physical    Chief complaint: Worsening high blood pressure          Patient is a 29 y.o. female  currently at 36w6d, who presents for repeat blood pressure check today.  Patient's blood pressures have been slowly getting worse and she has had 1-2 severe range blood pressures in the clinic along with multiple at home.  Today her blood pressures were mild range.  However I feel the patient has an exacerbation of her chronic hypertension and is starting towards preeclampsia at this point.  Patient does have a mild headache today but no other evidence of preeclampsia with severe features at this time.  Patient states positive fetal movement, denies loss of fluid, vaginal bleeding or contractions at this time.  Given the worsening of the patient's hypertension and concern for early preeclampsia I plan to move forward with delivery at 37 weeks and 0 days for maternal and fetal wellbeing.    The risks, benefits. and alternatives to  section were discussed.  The risks that were discussed included, but were not limited to, pain, infection, bleeding, hemorrhage,  injury to the bowel, bladder, uterus, tubes, ovaries, or baby which could require further surgery or prolonged hospitalization.  Remote possibility of hysterectomy and/or salpingo-ophorectomy. Remote possibility of death of baby and/or mother. I discussed the risk of bleeding with the patient and possible risk of blood transfusion.  Blood products carry risk of HIV, infection, hepatitis, and transfusion reaction. Patient is willing to accept blood products. The patient understands that SCD's will be placed on her legs to try and reduce the risk of clot formation in her legs.  She understands that we will have early ambulation to also reduce the risk of blood clot formation and to reduce the risk of pneumonia.  She will be given antibiotics prior to her surgery to help decrease the risk for infection.  All questions were  answered. Patient express understanding and desires to proceed with surgery.      Her prenatal care is complicated by chronic hypertension now with exacerbation and concern for early preeclampsia that could affect maternal and fetal wellbeing     Obstetric History   #: 1, Date: 17, Sex: Female, Weight: 3515 g (7 lb 12 oz), GA: 37w2d, Delivery: , Low Transverse, Apgar1: 7, Apgar5: 9, Living: Living, Birth Comments: None    #: 2, Date: None, Sex: None, Weight: None, GA: None, Delivery: None, Apgar1: None, Apgar5: None, Living: None, Birth Comments: None       The following portions of the patients history were reviewed and updated as appropriate: current medications, allergies, past medical history, past surgical history, past family history, past social history and problem list .       Prenatal Information:  Prenatal Results     POC Urine Glucose/Protein     Test Value Reference Range Date Time    Urine Glucose        Urine Protein              Initial Prenatal Labs     Test Value Reference Range Date Time    Hemoglobin        Hematocrit        Platelets 220 10*3/mm3 140 - 450 19 0523      219 10*3/mm3 140 - 450 19 1006      204 10*3/mm3 140 - 450 19 0911      217 10*3/mm3 140 - 450 10/25/19 1332      225 10*3/mm3 140 - 450 10/08/19 0915    Rubella IgG 45.2 IU/mL 0.0 - 9.9 17 1155      Immune  Immune 17 1155    Hepatitis B SAg Negative  Negative 17 1155    Hepatitis C Ab Negative  Negative 17 1155    RPR Non-Reactive  Non-Reactive 17 1155    ABO O   19 1006    Rh Positive   19 1006    Antibody Screen        HIV Negative  Negative 17 1155    Urine Culture No growth at 24 hours   17 1156    Gonorrhea Negative  Negative 19 0903    Chlamydia Negative  Negative 19 0903    TSH 0.778 uIU/mL 0.270 - 4.200 19 0911      0.817 uIU/mL 0.270 - 4.200 10/08/19 0915      0.923 uIU/mL 0.270 - 4.200 19 1004      0.983 mIU/mL  0.270 - 4.200 08/06/19 0903      1.480 mIU/mL 0.270 - 4.200 06/18/19 1025      2.430 mIU/mL 0.270 - 4.200 05/09/19 1025          2nd and 3rd Trimester     Test Value Reference Range Date Time    Hemoglobin (repeated) 12.0 g/dL 12.0 - 15.9 12/19/19 0523      12.0 g/dL 12.0 - 15.9 12/17/19 1006      10.9 g/dL 12.0 - 15.9 11/18/19 0911      9.9 g/dL 12.0 - 15.9 10/25/19 1332      9.7 g/dL 12.0 - 15.9 10/08/19 0915    Hematocrit (repeated) 35.7 % 34.0 - 46.6 12/19/19 0523      35.7 % 34.0 - 46.6 12/17/19 1006      34.0 % 34.0 - 46.6 11/18/19 0911      30.9 % 34.0 - 46.6 10/25/19 1332      29.7 % 34.0 - 46.6 10/08/19 0915     mg/dL 74 - 180 10/18/19 0854      159 mg/dL 60 - 140 10/08/19 0915    Antibody Screen (repeated) Negative   12/17/19 1006    GTT Fasting        GTT 1 Hr        GTT 2 Hr        GTT 3 Hr        Group B Strep              Drug Screening     Test Value Reference Range Date Time    Amphetamine Screen Negative  Negative 12/19/19 0523      Negative  Negative 12/17/19 1006    Barbiturate Screen Negative  Negative 12/19/19 0523      Negative  Negative 12/17/19 1006    Benzodiazepine Screen Negative  Negative 12/19/19 0523      Negative  Negative 12/17/19 1006    Methadone Screen Negative  Negative 12/19/19 0523      Negative  Negative 12/17/19 1006    Phencyclidine Screen Negative  Negative 12/19/19 0523      Negative  Negative 12/17/19 1006    Opiates Screen Negative  Negative 12/19/19 0523      Negative  Negative 12/17/19 1006    THC Screen Negative  Negative 12/19/19 0523      Negative  Negative 12/17/19 1006    Cocaine Screen Negative  Negative 12/19/19 0523      Negative  Negative 12/17/19 1006    Propoxyphene Screen Negative  Negative 12/19/19 0523      Negative  Negative 12/17/19 1006    Buprenorphine Screen Negative  Negative 12/19/19 0523      Negative  Negative 12/17/19 1006    Methamphetamine Screen Negative  Negative 12/19/19 0523      Negative  Negative 12/17/19 1006    Oxycodone Screen  Negative  Negative 12/19/19 0523      Negative  Negative 12/17/19 1006    Tricyclic Antidepressants Screen Negative  Negative 12/19/19 0523      Negative  Negative 12/17/19 1006          Other (Risk screening)     Test Value Reference Range Date Time    Varicella IgG        Parvovirus IgG        CMV IgG        Cystic Fibrosis        Hemoglobin electrophoresis        NIPT        MSAFP-4        AFP (for NTD only)                  External Prenatal Results     Pregnancy Outside Results - Transcribed From Office Records - See Scanned Records For Details     Test Value Date Time    Hgb 12.0 g/dL 12/19/19 0523      12.0 g/dL 12/17/19 1006      10.9 g/dL 11/18/19 0911      9.9 g/dL 10/25/19 1332      9.7 g/dL 10/08/19 0915    Hct 35.7 % 12/19/19 0523      35.7 % 12/17/19 1006      34.0 % 11/18/19 0911      30.9 % 10/25/19 1332      29.7 % 10/08/19 0915    ABO O  12/17/19 1006    Rh Positive  12/17/19 1006    Antibody Screen Negative  12/17/19 1006    Glucose Fasting GTT       Glucose Tolerance Test 1 hour       Glucose Tolerance Test 3 hour       Gonorrhea (discrete) Negative  08/06/19 0903    Chlamydia (discrete) Negative  08/06/19 0903    RPR Non-Reactive  05/26/17 1155    VDRL       Syphilis Antibody       Rubella 45.2 IU/mL 05/26/17 1155      Immune  05/26/17 1155    HBsAg Negative  05/26/17 1155    Herpes Simplex Virus PCR       Herpes Simplex VIrus Culture       HIV Negative  05/26/17 1155    Hep C RNA Quant PCR       Hep C Antibody Negative  05/26/17 1155    AFP 17.2 ng/mL 07/21/17 0955    Group B Strep Positive  12/11/17 0952    GBS Susceptibility to Clindamycin       GBS Susceptibility to Erythromycin       Fetal Fibronectin       Genetic Testing, Maternal Blood             Drug Screening     Test Value Date Time    Urine Drug Screen       Amphetamine Screen Negative  12/19/19 0523      Negative  12/17/19 1006    Barbiturate Screen Negative  12/19/19 0523      Negative  12/17/19 1006    Benzodiazepine Screen  Negative  19 0523      Negative  19 1006    Methadone Screen Negative  19 0523      Negative  19 1006    Phencyclidine Screen Negative  19 0523      Negative  19 1006    Opiates Screen Negative  19 0523      Negative  19 1006    THC Screen Negative  19 0523      Negative  19 1006    Cocaine Screen       Propoxyphene Screen Negative  19 0523      Negative  19 1006    Buprenorphine Screen Negative  19 0523      Negative  19 1006    Methamphetamine Screen       Oxycodone Screen Negative  19 0523      Negative  19 1006    Tricyclic Antidepressants Screen Negative  19 0523      Negative  19 1006                 Past OB History:     OB History    Para Term  AB Living   2 1 1 0 0 1   SAB TAB Ectopic Molar Multiple Live Births   0 0 0 0 0 1      # Outcome Date GA Lbr Ari/2nd Weight Sex Delivery Anes PTL Lv   2 Current            1 Term 17 37w2d  3515 g (7 lb 12 oz) F CS-LTranv EPI N VITA      Complications: Fetal Intolerance      Name: TOREY CAMARA      Apgar1: 7  Apgar5: 9        ALLERGIES:     Allergies   Allergen Reactions   • Cefzil [Cefprozil] Hives   • Celexa [Citalopram]      Patient states that she is not allergic to this med. It causes fatigue   • Eggs Or Egg-Derived Products Diarrhea   • Influenza Vaccines      Migraines/ vomiting has reaction 2015- not in past         Home Medications:     Prior to Admission medications    Medication Sig Start Date End Date Taking? Authorizing Provider   aspirin 81 MG chewable tablet Chew 81 mg Daily.   Yes Provider, MD Tess   cyanocobalamin 1000 MCG/ML injection INJECT 1 EACH AS DIRECTED TAKE AS DIRECTED. DAILY X 5, THEN WEEKLY FOR THE DURATION OF PREGNANCY 19  Yes Ayde Rodriguez MD   labetalol (NORMODYNE) 100 MG tablet Take 1 tablet by mouth 2 (Two) Times a Day. 19  Yes Vazquez Davis, DO   levothyroxine  (SYNTHROID, LEVOTHROID) 175 MCG tablet Take 1 tablet by mouth Daily. 19  Yes Beto Aquino APRN   Prenatal Vit-Fe Fumarate-FA (PRENATAL VITAMIN) 27-0.8 MG tablet Take 1 tablet by mouth Daily.   Yes Provider, MD Tess       Past Medical History: Past Medical History:   Diagnosis Date   • Abdominal pain     generalized cramping after eating wheat type products   • Acquired hypothyroidism     Secondary to Hashimoto's thyroiditis   • Back pain affecting pregnancy in second trimester    • Dysmenorrhea    • Endogenous obesity    • Excessive or frequent menstruation    • Hashimoto's thyroiditis    • Headache    • Hypertension 2017    gestational hypertension   • Myopia    • Vitamin D deficiency       Past Surgical History Past Surgical History:   Procedure Laterality Date   •  SECTION N/A 2017    Procedure:  SECTION PRIMARY;  Surgeon: Jayne Thibodeaux MD;  Location: [facility] LABOR DELIVERY;  Service:    • INJECTION OF MEDICATION  2013    Depo Provera (medroxyprogesterone acetate)   • INJECTION OF MEDICATION  2012    Kenalog x2   • INJECTION OF MEDICATION  2015    Toradol   • INJECTION OF MEDICATION  2015    Zofran   • WISDOM TOOTH EXTRACTION        Family History: Family History   Problem Relation Age of Onset   • Asthma Other    • Hypertension Other    • Other Other         Thyroid Disorder, Depressive Disorder, Smoking Tobacco, Anxiety   • Rheum arthritis Mother    • Hypertension Father    • Heart disease Maternal Grandfather    • Hypertension Maternal Grandfather    • Osteoporosis Paternal Grandmother    • Stroke Paternal Grandmother    • Anemia Paternal Grandmother    • Pulmonary embolism Maternal Grandmother       Social History:  reports that she has never smoked. She has never used smokeless tobacco.   reports that she does not drink alcohol.   reports that she does not use drugs.        Review of Systems                                                                                                                   Neuro no history of brain tumor    HENT no history of ear tumors    Eye no history of retinal tumors    Pulmonary no history of lung tumors    Cardiac no history of cardiac tumors    GI: No history of small bowel tumors    Musculoskeletal: No history of skeletal muscle tumors    Endocrine: No history of adrenal tumors    Lymphatic: No history of Hodgkin's disease    Renal: No history of renal cancer      Objective     Documented Vitals    12/18/19 1203   BP: 152/83   Weight: 125 kg (275 lb)       OBGyn Exam  Constitutional: Appears to be in no acute distress; Eyes: sclera normal; Endocrine system: thyroid palpate is normal; Pulmonary system: lungs clear; Cardiovascular system: heart regular rate and rhythm; Gastrointestinal system: abdomen soft nontender, active bowel sounds; Urologic system: CVA negative; Psychiatric: appropriate insight; Neurologic: gait within normal limits.  Cervix: Unchecked.  Fetal heart rate: 130 bpm, fundal height 36 cm.      Last Labs  Lab Results   Component Value Date    WBC 11.07 (H) 12/19/2019    RBC 4.38 12/19/2019    HGB 12.0 12/19/2019    HCT 35.7 12/19/2019    MCV 81.5 12/19/2019    MCH 27.4 12/19/2019    MCHC 33.6 12/19/2019    RDW 18.2 (H) 12/19/2019    RDWSD 54.3 (H) 12/19/2019    MPV 9.9 12/19/2019     12/19/2019        Lab Results   Component Value Date    GLUCOSE 116 (H) 12/17/2019    BUN 5 (L) 12/17/2019    CREATININE 0.45 (L) 12/17/2019     (L) 12/17/2019    K 4.0 12/17/2019     12/17/2019    CO2 20.0 (L) 12/17/2019    CALCIUM 8.9 12/17/2019    PROTEINTOT 6.6 12/17/2019    ALBUMIN 3.30 (L) 12/17/2019    ALT 23 12/17/2019    AST 17 12/17/2019    ALKPHOS 121 (H) 12/17/2019    BILITOT 0.3 12/17/2019    EGFRIFNONA >150 12/17/2019    GLOB 3.3 12/17/2019    AGRATIO 1.0 12/17/2019    BCR 11.1 12/17/2019    ANIONGAP 11.0 12/17/2019       No results found for: HCGQUAL      Assessment/Plan:  1. 29  y.o.  36w6d.  Patient with chronic hypertension now with exacerbation and difficulty to control, with concerns for progression to preeclampsia, no clear evidence of severe features although patient did have a mild headache.  Plan for patient to undergo  on 37 weeks and 0 days, which will be tomorrow.  Patient overall stable at this point.  Plan for routine postoperative care.  Patient to return sooner if headache worsens, vision changes or any other worsening symptoms.      Jerrica Marquez and I have discussed pain goals for this hospitalization after reviewing her current clinical condition, medical history and prior pain experiences.  The goal is to keep her pain level tolerable.  To help achieve this, I plan to spinal for anesthesia with oral pain medications after surgery for pain control.           This document has been electronically signed by Vazquez Davis DO on 2019 6:02 AM

## 2019-12-19 NOTE — ANESTHESIA POSTPROCEDURE EVALUATION
Patient: Jerrica Marquez    Procedure Summary     Date:  19 Room / Location:  [facility] LABOR DELIVERY 1 / [facility] LABOR DELIVERY    Anesthesia Start:  708 Anesthesia Stop:  900    Procedure:   SECTION REPEAT (N/A Abdomen) Diagnosis:       Chronic hypertension with exacerbation during pregnancy in third trimester      (Chronic hypertension with exacerbation during pregnancy in third trimester [O10.913])    Surgeon:  Vazquez Davis DO Provider:  Sylvester Mcclelland CRNA    Anesthesia Type:  spinal ASA Status:  3          Anesthesia Type: spinal    Vitals  Vitals Value Taken Time   /61 2019  8:55 AM   Temp 97.5 °F (36.4 °C) 2019  8:55 AM   Pulse 91 2019  8:55 AM   Resp 20 2019  8:55 AM   SpO2 98 % 2019  8:55 AM           Post Anesthesia Care and Evaluation    Patient location during evaluation: bedside  Patient participation: complete - patient participated  Level of consciousness: awake and alert  Pain score: 0  Pain management: adequate  Airway patency: patent  Anesthetic complications: No anesthetic complications  PONV Status: none  Cardiovascular status: acceptable  Respiratory status: acceptable  Hydration status: acceptable  Post Neuraxial Block status: No signs or symptoms of PDPH

## 2019-12-19 NOTE — ANESTHESIA PROCEDURE NOTES
Spinal Block      Patient location during procedure: OB  Start Time: 12/19/2019 7:10 AM  Stop Time: 12/19/2019 7:18 AM  Indication:at surgeon's request  Performed By  CRNA: Sylvester Mcclelland CRNA  Preanesthetic Checklist  Completed: patient identified, site marked, surgical consent, pre-op evaluation, timeout performed, IV checked, risks and benefits discussed and monitors and equipment checked  Spinal Block Prep:  Patient Position:sitting  Sterile Tech:gloves, cap, mask and sterile barriers  Prep:Betadine  Patient Monitoring:blood pressure monitoring, continuous pulse oximetry and EKG  Spinal Block Procedure  Approach:midline  Guidance:palpation technique  Location:L3-L4  Needle Type:Pencan  Needle Gauge:24 G  Placement of Spinal needle event:cerebrospinal fluid aspirated  Paresthesia: no  Fluid Appearance:clear  Medications: bupivacaine PF (MARCAINE) 0.75 % injection, 1.6 mL  Med Administered at 12/19/2019 7:18 AM   Post Assessment  Patient Tolerance:patient tolerated the procedure well with no apparent complications  Complications no

## 2019-12-20 ENCOUNTER — RESULTS ENCOUNTER (OUTPATIENT)
Dept: OBSTETRICS AND GYNECOLOGY | Facility: CLINIC | Age: 29
End: 2019-12-20

## 2019-12-20 DIAGNOSIS — O09.299 HISTORY OF PRE-ECLAMPSIA IN PRIOR PREGNANCY, CURRENTLY PREGNANT: ICD-10-CM

## 2019-12-20 DIAGNOSIS — O10.913 MATERNAL CHRONIC HYPERTENSION IN THIRD TRIMESTER: ICD-10-CM

## 2019-12-20 LAB
BASOPHILS # BLD AUTO: 0.04 10*3/MM3 (ref 0–0.2)
BASOPHILS NFR BLD AUTO: 0.4 % (ref 0–1.5)
DEPRECATED RDW RBC AUTO: 56.9 FL (ref 37–54)
EOSINOPHIL # BLD AUTO: 0.09 10*3/MM3 (ref 0–0.4)
EOSINOPHIL NFR BLD AUTO: 0.8 % (ref 0.3–6.2)
ERYTHROCYTE [DISTWIDTH] IN BLOOD BY AUTOMATED COUNT: 18.7 % (ref 12.3–15.4)
HCT VFR BLD AUTO: 31.1 % (ref 34–46.6)
HGB BLD-MCNC: 10.4 G/DL (ref 12–15.9)
IMM GRANULOCYTES # BLD AUTO: 0.11 10*3/MM3 (ref 0–0.05)
IMM GRANULOCYTES NFR BLD AUTO: 1 % (ref 0–0.5)
LYMPHOCYTES # BLD AUTO: 1.72 10*3/MM3 (ref 0.7–3.1)
LYMPHOCYTES NFR BLD AUTO: 15.5 % (ref 19.6–45.3)
MCH RBC QN AUTO: 27.6 PG (ref 26.6–33)
MCHC RBC AUTO-ENTMCNC: 33.4 G/DL (ref 31.5–35.7)
MCV RBC AUTO: 82.5 FL (ref 79–97)
MONOCYTES # BLD AUTO: 0.85 10*3/MM3 (ref 0.1–0.9)
MONOCYTES NFR BLD AUTO: 7.6 % (ref 5–12)
NEUTROPHILS # BLD AUTO: 8.32 10*3/MM3 (ref 1.7–7)
NEUTROPHILS NFR BLD AUTO: 74.7 % (ref 42.7–76)
NRBC BLD AUTO-RTO: 0 /100 WBC (ref 0–0.2)
PLATELET # BLD AUTO: 204 10*3/MM3 (ref 140–450)
PMV BLD AUTO: 9.5 FL (ref 6–12)
RBC # BLD AUTO: 3.77 10*6/MM3 (ref 3.77–5.28)
WBC NRBC COR # BLD: 11.13 10*3/MM3 (ref 3.4–10.8)

## 2019-12-20 PROCEDURE — 63710000001 DIPHENHYDRAMINE PER 50 MG: Performed by: NURSE ANESTHETIST, CERTIFIED REGISTERED

## 2019-12-20 PROCEDURE — 85025 COMPLETE CBC W/AUTO DIFF WBC: CPT | Performed by: OBSTETRICS & GYNECOLOGY

## 2019-12-20 PROCEDURE — 25010000002 KETOROLAC TROMETHAMINE PER 15 MG: Performed by: OBSTETRICS & GYNECOLOGY

## 2019-12-20 PROCEDURE — 99024 POSTOP FOLLOW-UP VISIT: CPT | Performed by: OBSTETRICS & GYNECOLOGY

## 2019-12-20 RX ORDER — IBUPROFEN 800 MG/1
800 TABLET ORAL EVERY 8 HOURS SCHEDULED
Status: DISCONTINUED | OUTPATIENT
Start: 2019-12-20 | End: 2019-12-21 | Stop reason: HOSPADM

## 2019-12-20 RX ORDER — ACETAMINOPHEN 500 MG
1000 TABLET ORAL EVERY 6 HOURS
Status: DISCONTINUED | OUTPATIENT
Start: 2019-12-20 | End: 2019-12-20

## 2019-12-20 RX ORDER — OXYCODONE HYDROCHLORIDE 5 MG/1
5 TABLET ORAL EVERY 4 HOURS PRN
Status: DISCONTINUED | OUTPATIENT
Start: 2019-12-20 | End: 2019-12-21 | Stop reason: HOSPADM

## 2019-12-20 RX ADMIN — SIMETHICONE CHEW TAB 80 MG 80 MG: 80 TABLET ORAL at 13:24

## 2019-12-20 RX ADMIN — OXYCODONE HYDROCHLORIDE 10 MG: 5 TABLET ORAL at 21:11

## 2019-12-20 RX ADMIN — KETOROLAC TROMETHAMINE 30 MG: 30 INJECTION, SOLUTION INTRAMUSCULAR; INTRAVENOUS at 01:24

## 2019-12-20 RX ADMIN — ACETAMINOPHEN 1000 MG: 500 TABLET ORAL at 09:41

## 2019-12-20 RX ADMIN — PRENATAL VIT W/ FE FUMARATE-FA TAB 27-0.8 MG 1 TABLET: 27-0.8 TAB at 09:40

## 2019-12-20 RX ADMIN — DIPHENHYDRAMINE HYDROCHLORIDE 25 MG: 25 CAPSULE ORAL at 00:17

## 2019-12-20 RX ADMIN — DOCUSATE SODIUM 100 MG: 100 CAPSULE, LIQUID FILLED ORAL at 09:40

## 2019-12-20 RX ADMIN — ACETAMINOPHEN 1000 MG: 500 TABLET ORAL at 17:28

## 2019-12-20 RX ADMIN — IBUPROFEN 800 MG: 800 TABLET ORAL at 21:12

## 2019-12-20 RX ADMIN — DOCUSATE SODIUM 100 MG: 100 CAPSULE, LIQUID FILLED ORAL at 21:12

## 2019-12-20 RX ADMIN — KETOROLAC TROMETHAMINE 30 MG: 30 INJECTION, SOLUTION INTRAMUSCULAR; INTRAVENOUS at 07:51

## 2019-12-20 RX ADMIN — ACETAMINOPHEN 1000 MG: 500 TABLET ORAL at 01:34

## 2019-12-20 RX ADMIN — OXYCODONE HYDROCHLORIDE 10 MG: 5 TABLET ORAL at 03:40

## 2019-12-20 RX ADMIN — LEVOTHYROXINE SODIUM 175 MCG: 150 TABLET ORAL at 05:42

## 2019-12-20 RX ADMIN — IBUPROFEN 800 MG: 800 TABLET ORAL at 13:24

## 2019-12-20 RX ADMIN — LABETALOL HYDROCHLORIDE 100 MG: 100 TABLET, FILM COATED ORAL at 09:39

## 2019-12-20 RX ADMIN — OXYCODONE HYDROCHLORIDE 5 MG: 5 TABLET ORAL at 15:21

## 2019-12-20 RX ADMIN — LABETALOL HYDROCHLORIDE 100 MG: 100 TABLET, FILM COATED ORAL at 21:12

## 2019-12-20 NOTE — PROGRESS NOTES
[facility] Kari Marquez  : 1990  MRN: [id number]  CSN: [id number]    Post-operative Day #1  Subjective   Her pain is well controlled.  Vaginal bleeding is spotting.  She is not passing gas and has not had a bowel movement. She is ambulating in her room and has some leg weakness while walking. Denies any falls or feeling unstable. She was having some itching over night that resolved with benadryl. Patient has not showered yet. Denies fever, chills, nausea and vomiting. She is bottle feeding.     Objective     Min/max vitals past 24 hours:   Temp  Min: 97.5 °F (36.4 °C)  Max: 98.2 °F (36.8 °C)  BP  Min: 115/59  Max: 145/82  Pulse  Min: 70  Max: 91  Pulse  Min: 70  Max: 91        General: well developed; well nourished  no acute distress   Abdomen: soft, non-tender; no masses  no umbilical or inguinal hernias are present  no hepato-splenomegaly  incision is clean, dry and intact   Pelvic: Not performed   Ext: Calves NT     Lab Results   Component Value Date    WBC 11.07 (H) 2019    HGB 12.0 2019    HCT 35.7 2019    MCV 81.5 2019     2019    RH Positive 2019    HEPBSAG Negative 2017        Assessment   1. Ms. Marquez is a 28 YO , pregnancy complicated by chronic HTN, POD #1 S/P  delivery. She is recovering well. Still yet to pass flatus but has normal bowel sounds on examination. Tolerating regular diet and ambulating well.      Plan   1. Continue routine post-operative care  2. Ambulate  3. Use PRNs for constipation          This document has been electronically signed by Sarabjit Iglesias MD on 2019 6:10 AM

## 2019-12-20 NOTE — PLAN OF CARE
Pt taking tylenol, tordal and oxy ir and controlling pain.  Ambulating in room, voiding spontaneously.  Bleeding scant, dressing dry and intact.

## 2019-12-21 VITALS
RESPIRATION RATE: 18 BRPM | OXYGEN SATURATION: 97 % | TEMPERATURE: 97.8 F | BODY MASS INDEX: 45.76 KG/M2 | HEART RATE: 85 BPM | SYSTOLIC BLOOD PRESSURE: 142 MMHG | DIASTOLIC BLOOD PRESSURE: 67 MMHG | WEIGHT: 275 LBS

## 2019-12-21 PROCEDURE — 99024 POSTOP FOLLOW-UP VISIT: CPT | Performed by: OBSTETRICS & GYNECOLOGY

## 2019-12-21 RX ORDER — IBUPROFEN 800 MG/1
800 TABLET ORAL EVERY 8 HOURS SCHEDULED
Qty: 60 TABLET | Refills: 2 | Status: SHIPPED | OUTPATIENT
Start: 2019-12-21 | End: 2020-02-11

## 2019-12-21 RX ORDER — OXYCODONE HYDROCHLORIDE 5 MG/1
5 TABLET ORAL EVERY 4 HOURS PRN
Qty: 18 TABLET | Refills: 0 | Status: SHIPPED | OUTPATIENT
Start: 2019-12-21 | End: 2019-12-26

## 2019-12-21 RX ORDER — ACETAMINOPHEN 500 MG
1000 TABLET ORAL EVERY 8 HOURS
Qty: 60 TABLET | Refills: 2 | Status: SHIPPED | OUTPATIENT
Start: 2019-12-21 | End: 2020-02-11

## 2019-12-21 RX ADMIN — DOCUSATE SODIUM 100 MG: 100 CAPSULE, LIQUID FILLED ORAL at 09:04

## 2019-12-21 RX ADMIN — OXYCODONE HYDROCHLORIDE 10 MG: 5 TABLET ORAL at 01:25

## 2019-12-21 RX ADMIN — LABETALOL HYDROCHLORIDE 100 MG: 100 TABLET, FILM COATED ORAL at 09:04

## 2019-12-21 RX ADMIN — OXYCODONE HYDROCHLORIDE 10 MG: 5 TABLET ORAL at 05:56

## 2019-12-21 RX ADMIN — OXYCODONE HYDROCHLORIDE 5 MG: 5 TABLET ORAL at 10:33

## 2019-12-21 RX ADMIN — IBUPROFEN 800 MG: 800 TABLET ORAL at 05:55

## 2019-12-21 RX ADMIN — LEVOTHYROXINE SODIUM 175 MCG: 150 TABLET ORAL at 05:56

## 2019-12-21 RX ADMIN — ACETAMINOPHEN 1000 MG: 500 TABLET ORAL at 09:04

## 2019-12-21 RX ADMIN — PRENATAL VIT W/ FE FUMARATE-FA TAB 27-0.8 MG 1 TABLET: 27-0.8 TAB at 09:04

## 2019-12-21 RX ADMIN — ACETAMINOPHEN 1000 MG: 500 TABLET ORAL at 01:24

## 2019-12-21 NOTE — DISCHARGE INSTR - ACTIVITY
No heavy lifting, no driving for 2 weeks or while taking narcotics  Pelvic rest for 6 weeks---no douching, tampons or intercourse  Report to doctor: heavy bleeding or passing large clots, foul odor to discharge, temp above 100.4, burning with urination, gapping or draining from incision/episiotomy, pain in the legs or redness and streaking in your breast, or for pain not relieved by pain medication  Continue taking medicines as prescribed--take prenatal or iron as long as you are breastfeeding  Baby blues are normal--normally occurs 3-4 days after delivery but should not last longer than 2-3 days.   Take rest periods several times during the day  Wear a good supportive bra 24 hours a day to prevent engorgement.

## 2019-12-21 NOTE — PLAN OF CARE
VSS; bonding appropriately with infant; ambulates in room; voids without difficulty; reports tolerable pain with PRN pain meds; states she is ready to go home today

## 2019-12-21 NOTE — DISCHARGE SUMMARY
[facility]  Jerrica Marquez  : 1990  MRN: [id number]  CSN: [id number]    Discharge Summary    Date of Admission: 2019  Date of Discharge: 19    Principle Discharge Dx:  1.  Exacerbation of chronic hypertension with concern for progression to preeclampsia.    Procedures Performed:  Procedure(s):   SECTION REPEAT    Brief History:  Patient is a 29 y.o. now  who presented to labor and delivery for repeat low transverse  section for exacerbation of hypertension difficult to control on medications.  Patient underwent uncomplicated repeat low transverse  section.    Hospital Course:  Her post-operative course was unremarkable.  On POD #2 she expressed the desire for D/C. She had no febrile morbidity. She had passed gas, and was urinating normally. She was eating a regular diet without difficulty. She was ambulating well. Her incision was clean, dry and intact. Discharge instructions were given.  All questions were answered.    Condition:  Stable  Discharge Activity:    Activity Instructions     Bathing Restrictions      Type of Restriction:  Bathing    Bathing Restrictions:  Other    Explain Bathing Restrictions:  No soaking in bathtub for 4 weeks, showers are fine.    Driving Restrictions      Type of Restriction:  Driving    Driving Restrictions:  No Driving (Time Limited)    Length:  Other    Indicate Length of Restriction:  No driving for 1 week or while on narcotic pain medications. Riding is car is fine.    Lifting Restrictions      Type of Restriction:  Lifting    Lifting Restrictions:  Other    Explain Lifing Restrictions:  No lifting more than infant and baby carrier together for 6 weeks.    Pelvic Rest      Nothing in the vagina for 6 weeks to include tampons or intercourse.    Sexual Activity Restrictions      Type of Restriction:  Sex    Explain Sexual Activity Restrictions:  No sexual intercourse for at least 6 weeks    Additional Activity  Instructions:      No heavy lifting, no driving for 2 weeks or while taking narcotics  Pelvic rest for 6 weeks---no douching, tampons or intercourse  Report to doctor: heavy bleeding or passing large clots, foul odor to discharge, temp above 100.4, burning with urination, gapping or draining from incision/episiotomy, pain in the legs or redness and streaking in your breast, or for pain not relieved by pain medication  Continue taking medicines as prescribed--take prenatal or iron as long as you are breastfeeding  Baby blues are normal--normally occurs 3-4 days after delivery but should not last longer than 2-3 days.   Take rest periods several times during the day  Wear a good supportive bra 24 hours a day to prevent engorgement.                Discharge Diet:  Regular  Discharge Medications:       Your medication list      START taking these medications      Instructions Last Dose Given Next Dose Due   acetaminophen 500 MG tablet  Commonly known as:  TYLENOL      Take 2 tablets by mouth Every 8 (Eight) Hours.       ibuprofen 800 MG tablet  Commonly known as:  ADVIL,MOTRIN      Take 1 tablet by mouth Every 8 (Eight) Hours.       oxyCODONE 5 MG immediate release tablet  Commonly known as:  ROXICODONE      Take 1 tablet by mouth Every 4 (Four) Hours As Needed for Moderate Pain  for up to 8 days.          CONTINUE taking these medications      Instructions Last Dose Given Next Dose Due   cyanocobalamin 1000 MCG/ML injection      INJECT 1 EACH AS DIRECTED TAKE AS DIRECTED. DAILY X 5, THEN WEEKLY FOR THE DURATION OF PREGNANCY       labetalol 100 MG tablet  Commonly known as:  NORMODYNE      Take 1 tablet by mouth 2 (Two) Times a Day.       levothyroxine 175 MCG tablet  Commonly known as:  SYNTHROID, LEVOTHROID      Take 1 tablet by mouth Daily.       Prenatal Vitamin 27-0.8 MG tablet      Take 1 tablet by mouth Daily.          STOP taking these medications    aspirin 81 MG chewable tablet              Where to Get Your  Medications      These medications were sent to [facility]/pharmacy #[id number] - [place], [place] - [address] [place] [address] - [phone]  - [phone] [address] [address]    Phone:  [phone]   · acetaminophen 500 MG tablet  · ibuprofen 800 MG tablet  · oxyCODONE 5 MG immediate release tablet       Discharge Disposition:  Home  Follow-Up: 1 to 2 weeks in OB/GYN clinic with Dr. Davis  Future Appointments   Date Time Provider Department Center   3/9/2020 10:15 AM Beto Aquino APRN [facility] None         This document has been electronically signed by Vazquez Davis DO on December 21, 2019 8:24 AM

## 2019-12-21 NOTE — PROGRESS NOTES
[facility]  Jerrica Marquez  : 1990  MRN: [id number]  CSN: [id number]    Postpartum Day #2  Subjective   Doing well and recovering appropriately at this time.  Ambulating without difficulty, urinating without difficulty.  Patient is passing flatus, has not had a bowel movement.  Is tolerating regular diet.  Pain is well controlled on current pain regimen.     Objective     Min/max vitals past 24 hours:   Temp  Min: 96.4 °F (35.8 °C)  Max: 98.4 °F (36.9 °C)  BP  Min: 117/55  Max: 140/63  Pulse  Min: 79  Max: 88  Pulse  Min: 79  Max: 88        Abdomen: soft, appropriately tender, incision healing appropriately with no erythema or evidence of infection.   Calves: Nontender, no cords palpable   Pelvic: deferred     Lab Results   Component Value Date    WBC 11.13 (H) 2019    HGB 10.4 (L) 2019    HCT 31.1 (L) 2019    MCV 82.5 2019     2019    RH Positive 2019    HEPBSAG Negative 2017        Assessment   1. Postpartum Day #2 S/P repeat low transverse  section, doing well and recovering appropriately at this time.     Plan   1. Continue routine postpartum care  2. Discharge to home today          This document has been electronically signed by Vazquez Davis DO on 2019 8:20 AM

## 2019-12-21 NOTE — PLAN OF CARE
Problem: Patient Care Overview  Goal: Individualization and Mutuality  Outcome: Ongoing (interventions implemented as appropriate)  Flowsheets  Taken 12/20/2019 1841 by Claudia Beavers RN  Patient Specific Goals (Include Timeframe): Hopes to be dc'd home tomorrow 12/21/19 with NB by 1800  What Information Would Help Us Give You More Personalized Care?: denies  How Would You and/or Your Support Person Like to Participate in Your Care?: Help when needed  Patient Specific Preferences: Bottle feeding formula every 3-4 hours, taking Ibuprfen and Tylenol scheduled and using less Roxycodone wiht pain being managed  Taken 12/19/2019 0600 by Purnima Conrad, RN  How to Address Anxieties/Fears: breathe, prayer  Taken 12/19/2019 1739 by Renae Flannery, RN  Patient Specific Interventions: educating pt and answering questions  What Anxieties, Fears, Concerns, or Questions Do You Have About Your Care?: denies

## 2019-12-23 NOTE — PAYOR COMM NOTE
"Tena Robertmag   [facility]  phone [phone]  fax [phone]    Jerrica Camara (29 y.o. Female)     Date of Birth Social Security Number Address Home Phone MRN    1990  [address] [place] [address] [phone] [id number]    Christianity Marital Status          Scientology        Admission Date Admission Type Admitting Provider Attending Provider Department, Room/Bed    12/19/19 Elective Ryan, Vazquez WALTON DO  [facility] MOTHER BABY, M757/1    Discharge Date Discharge Disposition Discharge Destination        12/21/2019 Home or Self Care Home             Attending Provider:  (none)   Allergies:  Cefzil [Cefprozil], Celexa [Citalopram], Influenza Vaccines    Isolation:  None   Infection:  None   Code Status:  Prior    Ht:  165.1 cm (65\")   Wt:  125 kg (275 lb)    Admission Cmt:  None   Principal Problem:  Chronic hypertension with exacerbation during pregnancy in third trimester [O10.913] More...                 Active Insurance as of 12/19/2019     Primary Coverage     Payor Plan Insurance Group Employer/Plan Group    ANTHEM BLUE CROSS ANTHEM BLUE CROSS BLUE SHIELD PPO [id number]     Payor Plan Address Payor Plan Phone Number Payor Plan Fax Number Effective Dates    [address] [phone]  2/21/2018 - None Entered    [facility] [address]       Subscriber Name Subscriber Birth Date Member ID       ROSA MARIA CAMARA 8/30/1989 [id number]           Secondary Coverage     Payor Plan Insurance Group Employer/Plan Group    ANTHEM MEDICAID ANTHEM MEDICAID [id number]     Payor Plan Address Payor Plan Phone Number Payor Plan Fax Number Effective Dates    [address] [address] [phone]  4/1/2019 - None Entered    [facility] [address]       Subscriber Name Subscriber Birth Date Member ID       JERRICA CAMARA 1990 [id number]                 Emergency Contacts      (Rel.) Home Phone Work Phone Mobile Phone    Komal Kline " (Mother) [phone] -- [phone]    Ran Marquez (Spouse) [phone] -- [phone]               Discharge Summary      RyanVazquez simental DO at 19 0824          [facility]  Jerrica Marquez  : 1990  MRN: [id number]  CSN: [id number]    Discharge Summary    Date of Admission: 2019  Date of Discharge:     Principle Discharge Dx:  1.  Exacerbation of chronic hypertension with concern for progression to preeclampsia.    Procedures Performed:  Procedure(s):   SECTION REPEAT    Brief History:  Patient is a 29 y.o. now  who presented to labor and delivery for repeat low transverse  section for exacerbation of hypertension difficult to control on medications.  Patient underwent uncomplicated repeat low transverse  section.    Hospital Course:  Her post-operative course was unremarkable.  On POD #2 she expressed the desire for D/C. She had no febrile morbidity. She had passed gas, and was urinating normally. She was eating a regular diet without difficulty. She was ambulating well. Her incision was clean, dry and intact. Discharge instructions were given.  All questions were answered.    Condition:  Stable  Discharge Activity:    Activity Instructions     Bathing Restrictions      Type of Restriction:  Bathing    Bathing Restrictions:  Other    Explain Bathing Restrictions:  No soaking in bathtub for 4 weeks, showers are fine.    Driving Restrictions      Type of Restriction:  Driving    Driving Restrictions:  No Driving (Time Limited)    Length:  Other    Indicate Length of Restriction:  No driving for 1 week or while on narcotic pain medications. Riding is car is fine.    Lifting Restrictions      Type of Restriction:  Lifting    Lifting Restrictions:  Other    Explain Lifing Restrictions:  No lifting more than infant and baby carrier together for 6 weeks.    Pelvic Rest      Nothing in the vagina for 6 weeks to include tampons or intercourse.     Sexual Activity Restrictions      Type of Restriction:  Sex    Explain Sexual Activity Restrictions:  No sexual intercourse for at least 6 weeks    Additional Activity Instructions:      No heavy lifting, no driving for 2 weeks or while taking narcotics  Pelvic rest for 6 weeks---no douching, tampons or intercourse  Report to doctor: heavy bleeding or passing large clots, foul odor to discharge, temp above 100.4, burning with urination, gapping or draining from incision/episiotomy, pain in the legs or redness and streaking in your breast, or for pain not relieved by pain medication  Continue taking medicines as prescribed--take prenatal or iron as long as you are breastfeeding  Baby blues are normal--normally occurs 3-4 days after delivery but should not last longer than 2-3 days.   Take rest periods several times during the day  Wear a good supportive bra 24 hours a day to prevent engorgement.                Discharge Diet:  Regular  Discharge Medications:       Your medication list      START taking these medications      Instructions Last Dose Given Next Dose Due   acetaminophen 500 MG tablet  Commonly known as:  TYLENOL      Take 2 tablets by mouth Every 8 (Eight) Hours.       ibuprofen 800 MG tablet  Commonly known as:  ADVIL,MOTRIN      Take 1 tablet by mouth Every 8 (Eight) Hours.       oxyCODONE 5 MG immediate release tablet  Commonly known as:  ROXICODONE      Take 1 tablet by mouth Every 4 (Four) Hours As Needed for Moderate Pain  for up to 8 days.          CONTINUE taking these medications      Instructions Last Dose Given Next Dose Due   cyanocobalamin 1000 MCG/ML injection      INJECT 1 EACH AS DIRECTED TAKE AS DIRECTED. DAILY X 5, THEN WEEKLY FOR THE DURATION OF PREGNANCY       labetalol 100 MG tablet  Commonly known as:  NORMODYNE      Take 1 tablet by mouth 2 (Two) Times a Day.       levothyroxine 175 MCG tablet  Commonly known as:  SYNTHROID, LEVOTHROID      Take 1 tablet by mouth Daily.        Prenatal Vitamin 27-0.8 MG tablet      Take 1 tablet by mouth Daily.          STOP taking these medications    aspirin 81 MG chewable tablet              Where to Get Your Medications      These medications were sent to [facility]/pharmacy #[id number] - [place], [place] - [address] [facility] - [phone]  - [phone] [address] [address]    Phone:  [phone]   · acetaminophen 500 MG tablet  · ibuprofen 800 MG tablet  · oxyCODONE 5 MG immediate release tablet       Discharge Disposition:  Home  Follow-Up: 1 to 2 weeks in OB/GYN clinic with Dr. Rosas  Future Appointments   Date Time Provider Department Center   3/9/2020 10:15 AM Beto Aquino APRN [facility] None         This document has been electronically signed by Ronnie Rosas DO on December 21, 2019 8:24 AM              Electronically signed by Ronnie Rosas DO at 12/21/19 0825       Discharge Order (From admission, onward)     Start     Ordered    12/21/19 0823  Discharge patient  Once     Expected Discharge Date:  12/21/19    Discharge Disposition:  Home or Self Care    Physician of Record for Attribution - Please select from Treatment Team:  RONNIE ROSAS [[id number]]    Review needed by CMO to determine Physician of Record:  No       Question Answer Comment   Physician of Record for Attribution - Please select from Treatment Team RONNIE ROSAS    Review needed by CMO to determine Physician of Record No        12/21/19 0876

## 2019-12-23 NOTE — PAYOR COMM NOTE
"Tena Robertmag   [facility]  phone [phone]  fax [phone]      Jerrica Camara (29 y.o. Female)     Date of Birth Social Security Number Address Home Phone MRN    1990  [address] [place] [address] [phone] [id number]    Mormon Marital Status          Scientology        Admission Date Admission Type Admitting Provider Attending Provider Department, Room/Bed    12/19/19 Elective Ryan, Vazquez WALTON DO  [facility] MOTHER BABY, M757/1    Discharge Date Discharge Disposition Discharge Destination        12/21/2019 Home or Self Care Home             Attending Provider:  (none)   Allergies:  Cefzil [Cefprozil], Celexa [Citalopram], Influenza Vaccines    Isolation:  None   Infection:  None   Code Status:  Prior    Ht:  165.1 cm (65\")   Wt:  125 kg (275 lb)    Admission Cmt:  None   Principal Problem:  Chronic hypertension with exacerbation during pregnancy in third trimester [O10.913] More...                 Active Insurance as of 12/19/2019     Primary Coverage     Payor Plan Insurance Group Employer/Plan Group    ANTHEM BLUE CROSS ANTHEM BLUE CROSS BLUE SHIELD PPO [id number]     Payor Plan Address Payor Plan Phone Number Payor Plan Fax Number Effective Dates    [address] [phone]  2/21/2018 - None Entered    [facility] [address]       Subscriber Name Subscriber Birth Date Member ID       ROSA MARIA CAMARA 8[birth year]1989 [id number]           Secondary Coverage     Payor Plan Insurance Group Employer/Plan Group    ANTHEM MEDICAID ANTHEM MEDICAID [id number]     Payor Plan Address Payor Plan Phone Number Payor Plan Fax Number Effective Dates    [address] [address] [phone]  4/1/2019 - None Entered    [facility] [address]       Subscriber Name Subscriber Birth Date Member ID       JERRICA CAMARA 1990 [id number]                 Emergency Contacts      (Rel.) Home Phone Work Phone Mobile Phone    Komal Kline " (Mother) [phone] -- [phone]    Ran Marquez (Spouse) [phone] -- [phone]               Discharge Summary      RyanVazquez simental DO at 19 0824          [facility]  Jerrica Marquez  : 1990  MRN: [id number]  CSN: [id number]    Discharge Summary    Date of Admission: 2019  Date of Discharge:     Principle Discharge Dx:  1.  Exacerbation of chronic hypertension with concern for progression to preeclampsia.    Procedures Performed:  Procedure(s):   SECTION REPEAT    Brief History:  Patient is a 29 y.o. now  who presented to labor and delivery for repeat low transverse  section for exacerbation of hypertension difficult to control on medications.  Patient underwent uncomplicated repeat low transverse  section.    Hospital Course:  Her post-operative course was unremarkable.  On POD #2 she expressed the desire for D/C. She had no febrile morbidity. She had passed gas, and was urinating normally. She was eating a regular diet without difficulty. She was ambulating well. Her incision was clean, dry and intact. Discharge instructions were given.  All questions were answered.    Condition:  Stable  Discharge Activity:    Activity Instructions     Bathing Restrictions      Type of Restriction:  Bathing    Bathing Restrictions:  Other    Explain Bathing Restrictions:  No soaking in bathtub for 4 weeks, showers are fine.    Driving Restrictions      Type of Restriction:  Driving    Driving Restrictions:  No Driving (Time Limited)    Length:  Other    Indicate Length of Restriction:  No driving for 1 week or while on narcotic pain medications. Riding is car is fine.    Lifting Restrictions      Type of Restriction:  Lifting    Lifting Restrictions:  Other    Explain Lifing Restrictions:  No lifting more than infant and baby carrier together for 6 weeks.    Pelvic Rest      Nothing in the vagina for 6 weeks to include tampons or intercourse.     Sexual Activity Restrictions      Type of Restriction:  Sex    Explain Sexual Activity Restrictions:  No sexual intercourse for at least 6 weeks    Additional Activity Instructions:      No heavy lifting, no driving for 2 weeks or while taking narcotics  Pelvic rest for 6 weeks---no douching, tampons or intercourse  Report to doctor: heavy bleeding or passing large clots, foul odor to discharge, temp above 100.4, burning with urination, gapping or draining from incision/episiotomy, pain in the legs or redness and streaking in your breast, or for pain not relieved by pain medication  Continue taking medicines as prescribed--take prenatal or iron as long as you are breastfeeding  Baby blues are normal--normally occurs 3-4 days after delivery but should not last longer than 2-3 days.   Take rest periods several times during the day  Wear a good supportive bra 24 hours a day to prevent engorgement.                Discharge Diet:  Regular  Discharge Medications:       Your medication list      START taking these medications      Instructions Last Dose Given Next Dose Due   acetaminophen 500 MG tablet  Commonly known as:  TYLENOL      Take 2 tablets by mouth Every 8 (Eight) Hours.       ibuprofen 800 MG tablet  Commonly known as:  ADVIL,MOTRIN      Take 1 tablet by mouth Every 8 (Eight) Hours.       oxyCODONE 5 MG immediate release tablet  Commonly known as:  ROXICODONE      Take 1 tablet by mouth Every 4 (Four) Hours As Needed for Moderate Pain  for up to 8 days.          CONTINUE taking these medications      Instructions Last Dose Given Next Dose Due   cyanocobalamin 1000 MCG/ML injection      INJECT 1 EACH AS DIRECTED TAKE AS DIRECTED. DAILY X 5, THEN WEEKLY FOR THE DURATION OF PREGNANCY       labetalol 100 MG tablet  Commonly known as:  NORMODYNE      Take 1 tablet by mouth 2 (Two) Times a Day.       levothyroxine 175 MCG tablet  Commonly known as:  SYNTHROID, LEVOTHROID      Take 1 tablet by mouth Daily.        Prenatal Vitamin 27-0.8 MG tablet      Take 1 tablet by mouth Daily.          STOP taking these medications    aspirin 81 MG chewable tablet              Where to Get Your Medications      These medications were sent to [facility]/pharmacy #[id number] - [place], [place] - [address] [facility] - [phone]  - [phone] [address] [address]    Phone:  [phone]   · acetaminophen 500 MG tablet  · ibuprofen 800 MG tablet  · oxyCODONE 5 MG immediate release tablet       Discharge Disposition:  Home  Follow-Up: 1 to 2 weeks in OB/GYN clinic with Dr. Rosas  Future Appointments   Date Time Provider Department Center   3/9/2020 10:15 AM Beto Aquino APRN [facility] None         This document has been electronically signed by Ronnie Rosas DO on December 21, 2019 8:24 AM              Electronically signed by Ronnie Rosas DO at 12/21/19 0825       Discharge Order (From admission, onward)     Start     Ordered    12/21/19 0823  Discharge patient  Once     Expected Discharge Date:  12/21/19    Discharge Disposition:  Home or Self Care    Physician of Record for Attribution - Please select from Treatment Team:  RONNIE ROSAS [[id number]]    Review needed by CMO to determine Physician of Record:  No       Question Answer Comment   Physician of Record for Attribution - Please select from Treatment Team RONNIE ROSAS    Review needed by CMO to determine Physician of Record No        12/21/19 0844

## 2019-12-26 ENCOUNTER — OFFICE VISIT (OUTPATIENT)
Dept: OBSTETRICS AND GYNECOLOGY | Facility: CLINIC | Age: 29
End: 2019-12-26

## 2019-12-26 VITALS
SYSTOLIC BLOOD PRESSURE: 140 MMHG | DIASTOLIC BLOOD PRESSURE: 92 MMHG | WEIGHT: 262.8 LBS | HEIGHT: 65 IN | BODY MASS INDEX: 43.78 KG/M2

## 2019-12-26 DIAGNOSIS — Z09 POSTOPERATIVE FOLLOW-UP: Primary | ICD-10-CM

## 2019-12-26 PROCEDURE — 0503F POSTPARTUM CARE VISIT: CPT | Performed by: OBSTETRICS & GYNECOLOGY

## 2019-12-27 ENCOUNTER — RESULTS ENCOUNTER (OUTPATIENT)
Dept: OBSTETRICS AND GYNECOLOGY | Facility: CLINIC | Age: 29
End: 2019-12-27

## 2019-12-27 DIAGNOSIS — O09.299 HISTORY OF PRE-ECLAMPSIA IN PRIOR PREGNANCY, CURRENTLY PREGNANT: ICD-10-CM

## 2019-12-27 DIAGNOSIS — O10.913 MATERNAL CHRONIC HYPERTENSION IN THIRD TRIMESTER: ICD-10-CM

## 2019-12-27 NOTE — PROGRESS NOTES
"Chief complaint: Postpartum visit    SUBJECTIVE:   Pt is a 29 y.o.  now s/p repeat low transverse  section approximately 1 week ago. Pt denies fever, abdominal pain, n/v/d/c, VB, Pt currently bottle feeding and taking PNV, ambulating without difficulty, urinating and stooling without complaints and tolerating normal diet.  No depression symptoms at this time.  Not sure what she wants for contraception at this time  OBJECTIVE:  /92   Ht 165.1 cm (65\")   Wt 119 kg (262 lb 12.8 oz)   LMP 2019 (Approximate)   BMI 43.73 kg/m²     Review of Systems   Constitutional: Negative for chills, fatigue and fever.   HENT: Negative for sore throat.    Eyes: Negative for visual disturbance.   Respiratory: Negative for cough, shortness of breath and wheezing.    Cardiovascular: Negative for chest pain, palpitations and leg swelling.   Gastrointestinal: Negative for abdominal pain, diarrhea, nausea and vomiting.   Genitourinary: Negative for dysuria, flank pain, frequency, vaginal bleeding, vaginal discharge and vaginal pain.   Neurological: Negative for syncope, light-headedness and headaches.   Psychiatric/Behavioral: Negative for dysphoric mood and suicidal ideas. The patient is not nervous/anxious.        Physical Exam   Constitutional: She is oriented to person, place, and time. She appears well-developed and well-nourished. No distress.   HENT:   Head: Normocephalic.   Abdominal: Soft. She exhibits no distension. There is no tenderness. There is no rebound and no guarding.   Appropriately healing Pfannenstiel incision at this time.   Musculoskeletal: Normal range of motion.   Neurological: She is alert and oriented to person, place, and time.   Skin: Skin is warm and dry. She is not diaphoretic.   Psychiatric: She has a normal mood and affect. Her behavior is normal. Judgment and thought content normal.       ASSESSMENT:    Pt is a 29 y.o.  s/p repeat low transverse  section doing " well and recovering appropriately at this time.  PLAN:   1.  Uncertain about contraception at this time  2. Pt to continue to increase exercise/daily activities as tolerated   3. Continue prenatal vitamins   4. f/u in 2 weeks  5.  Continue labetalol 100 mg twice daily    Med reconciliation completed.        This document has been electronically signed by Vazquez Davis DO on December 26, 2019 8:31 PM

## 2019-12-31 DIAGNOSIS — O10.913 MATERNAL CHRONIC HYPERTENSION IN THIRD TRIMESTER: ICD-10-CM

## 2019-12-31 DIAGNOSIS — O09.299 HISTORY OF PRE-ECLAMPSIA IN PRIOR PREGNANCY, CURRENTLY PREGNANT: ICD-10-CM

## 2020-01-03 ENCOUNTER — RESULTS ENCOUNTER (OUTPATIENT)
Dept: OBSTETRICS AND GYNECOLOGY | Facility: CLINIC | Age: 30
End: 2020-01-03

## 2020-01-03 DIAGNOSIS — O10.913 MATERNAL CHRONIC HYPERTENSION IN THIRD TRIMESTER: ICD-10-CM

## 2020-01-10 ENCOUNTER — RESULTS ENCOUNTER (OUTPATIENT)
Dept: OBSTETRICS AND GYNECOLOGY | Facility: CLINIC | Age: 30
End: 2020-01-10

## 2020-01-10 DIAGNOSIS — O09.299 HISTORY OF PRE-ECLAMPSIA IN PRIOR PREGNANCY, CURRENTLY PREGNANT: ICD-10-CM

## 2020-01-10 DIAGNOSIS — O10.913 MATERNAL CHRONIC HYPERTENSION IN THIRD TRIMESTER: ICD-10-CM

## 2020-01-17 ENCOUNTER — RESULTS ENCOUNTER (OUTPATIENT)
Dept: OBSTETRICS AND GYNECOLOGY | Facility: CLINIC | Age: 30
End: 2020-01-17

## 2020-01-17 DIAGNOSIS — O10.913 MATERNAL CHRONIC HYPERTENSION IN THIRD TRIMESTER: ICD-10-CM

## 2020-01-17 DIAGNOSIS — O09.299 HISTORY OF PRE-ECLAMPSIA IN PRIOR PREGNANCY, CURRENTLY PREGNANT: ICD-10-CM

## 2020-01-23 ENCOUNTER — APPOINTMENT (OUTPATIENT)
Dept: LAB | Facility: HOSPITAL | Age: 30
End: 2020-01-23

## 2020-01-23 ENCOUNTER — OFFICE VISIT (OUTPATIENT)
Dept: OBSTETRICS AND GYNECOLOGY | Facility: CLINIC | Age: 30
End: 2020-01-23

## 2020-01-23 VITALS
SYSTOLIC BLOOD PRESSURE: 138 MMHG | HEIGHT: 65 IN | DIASTOLIC BLOOD PRESSURE: 85 MMHG | WEIGHT: 253.4 LBS | BODY MASS INDEX: 42.22 KG/M2

## 2020-01-23 DIAGNOSIS — Z09 POSTOPERATIVE FOLLOW-UP: Primary | ICD-10-CM

## 2020-01-23 LAB
T4 FREE SERPL-MCNC: 1.62 NG/DL (ref 0.93–1.7)
TSH SERPL DL<=0.05 MIU/L-ACNC: 0.18 UIU/ML (ref 0.27–4.2)

## 2020-01-23 PROCEDURE — 36415 COLL VENOUS BLD VENIPUNCTURE: CPT | Performed by: NURSE PRACTITIONER

## 2020-01-23 PROCEDURE — 84439 ASSAY OF FREE THYROXINE: CPT | Performed by: NURSE PRACTITIONER

## 2020-01-23 PROCEDURE — 0503F POSTPARTUM CARE VISIT: CPT | Performed by: OBSTETRICS & GYNECOLOGY

## 2020-01-23 PROCEDURE — 84443 ASSAY THYROID STIM HORMONE: CPT | Performed by: NURSE PRACTITIONER

## 2020-01-23 RX ORDER — NORGESTIMATE AND ETHINYL ESTRADIOL 0.25-0.035
1 KIT ORAL DAILY
Qty: 28 TABLET | Refills: 12 | Status: SHIPPED | OUTPATIENT
Start: 2020-01-23 | End: 2020-06-10

## 2020-01-23 NOTE — PROGRESS NOTES
"Chief complaint: Postpartum visit    SUBJECTIVE:   Pt is a 29 y.o.  now s/p repeat low transverse  section approximately 5 weeks ago. Pt denies fever, abdominal pain, n/v/d/c, VB, Pt currently bottle feeding and taking PNV, ambulating without difficulty, urinating and stooling without complaints and tolerating normal diet.  No intercourse yet. EPDS 2/30 mostly anxiety, no SI or HI currently. Desires OCP  for contraception.  OBJECTIVE:  /85 (BP Location: Left arm, Patient Position: Sitting, Cuff Size: Adult)   Ht 165.1 cm (65\")   Wt 115 kg (253 lb 6.4 oz)   LMP 2019 (Approximate)   BMI 42.17 kg/m²     Review of Systems   Constitutional: Negative for chills, fatigue and fever.   HENT: Negative for sore throat.    Eyes: Negative for visual disturbance.   Respiratory: Negative for cough, shortness of breath and wheezing.    Cardiovascular: Negative for chest pain, palpitations and leg swelling.   Gastrointestinal: Negative for abdominal pain, diarrhea, nausea and vomiting.   Genitourinary: Negative for dysuria, flank pain, frequency, vaginal bleeding, vaginal discharge and vaginal pain.   Neurological: Negative for syncope, light-headedness and headaches.   Psychiatric/Behavioral: Negative for dysphoric mood and suicidal ideas. The patient is not nervous/anxious.        Physical Exam   Constitutional: She is oriented to person, place, and time. She appears well-developed and well-nourished. No distress.   HENT:   Head: Normocephalic.   Cardiovascular: Normal rate, regular rhythm, normal heart sounds and intact distal pulses.   No murmur heard.  Pulmonary/Chest: Effort normal and breath sounds normal. No respiratory distress. She has no wheezes.   Abdominal: Soft. Bowel sounds are normal. She exhibits no distension and no mass. There is no tenderness. There is no rebound and no guarding.   Well-healed Pfannenstiel incision.   Musculoskeletal: Normal range of motion. She exhibits no edema, " tenderness or deformity.   Neurological: She is alert and oriented to person, place, and time.   Skin: Skin is warm and dry. No erythema.   Psychiatric: She has a normal mood and affect. Her behavior is normal. Judgment and thought content normal.   Vitals reviewed.      ASSESSMENT:    Pt is a 29 y.o.  s/p repeat low transverse  section doing well and recovering appropriately.  PLAN:   1. OCP for contraception  2. Pt to continue to increase exercise/daily activities as tolerated   3. Continue prenatal vitamins   4. f/u prn    Med reconciliation completed.        This document has been electronically signed by Vazquez Davis DO on 2020 1:32 PM

## 2020-01-24 ENCOUNTER — RESULTS ENCOUNTER (OUTPATIENT)
Dept: OBSTETRICS AND GYNECOLOGY | Facility: CLINIC | Age: 30
End: 2020-01-24

## 2020-01-24 DIAGNOSIS — O10.913 MATERNAL CHRONIC HYPERTENSION IN THIRD TRIMESTER: ICD-10-CM

## 2020-01-24 DIAGNOSIS — O09.299 HISTORY OF PRE-ECLAMPSIA IN PRIOR PREGNANCY, CURRENTLY PREGNANT: ICD-10-CM

## 2020-01-29 RX ORDER — LABETALOL 100 MG/1
TABLET, FILM COATED ORAL
Qty: 60 TABLET | Refills: 6 | OUTPATIENT
Start: 2020-01-29

## 2020-02-11 ENCOUNTER — OFFICE VISIT (OUTPATIENT)
Dept: FAMILY MEDICINE CLINIC | Facility: CLINIC | Age: 30
End: 2020-02-11

## 2020-02-11 VITALS
HEIGHT: 65 IN | BODY MASS INDEX: 42.77 KG/M2 | SYSTOLIC BLOOD PRESSURE: 122 MMHG | HEART RATE: 69 BPM | WEIGHT: 256.7 LBS | OXYGEN SATURATION: 99 % | DIASTOLIC BLOOD PRESSURE: 80 MMHG

## 2020-02-11 DIAGNOSIS — R19.7 DIARRHEA, UNSPECIFIED TYPE: ICD-10-CM

## 2020-02-11 DIAGNOSIS — E66.01 CLASS 3 SEVERE OBESITY WITH BODY MASS INDEX (BMI) OF 40.0 TO 44.9 IN ADULT, UNSPECIFIED OBESITY TYPE, UNSPECIFIED WHETHER SERIOUS COMORBIDITY PRESENT (HCC): ICD-10-CM

## 2020-02-11 DIAGNOSIS — R10.9 ABDOMINAL CRAMPING: ICD-10-CM

## 2020-02-11 DIAGNOSIS — R03.0 ELEVATED BLOOD PRESSURE READING WITHOUT DIAGNOSIS OF HYPERTENSION: Primary | ICD-10-CM

## 2020-02-11 DIAGNOSIS — E06.3 HASHIMOTO'S DISEASE: ICD-10-CM

## 2020-02-11 DIAGNOSIS — Z76.89 ENCOUNTER TO ESTABLISH CARE: ICD-10-CM

## 2020-02-11 PROCEDURE — 99203 OFFICE O/P NEW LOW 30 MIN: CPT | Performed by: FAMILY MEDICINE

## 2020-02-11 RX ORDER — DICYCLOMINE HYDROCHLORIDE 10 MG/1
10 CAPSULE ORAL
Qty: 120 CAPSULE | Refills: 0 | Status: SHIPPED | OUTPATIENT
Start: 2020-02-11 | End: 2020-03-12

## 2020-02-11 NOTE — PROGRESS NOTES
Subjective   Chief Complaint   Patient presents with   • [facility]     Jerrica Marquez is a 29 y.o. year old presenting to [facility] as new patient.      Additional Concerns:    High blood pressure - Patient had preeclampsia during first pregnancy.  Had gestational hypertension again during the second pregnancy.  She was treated with labetalol during her pregnancy, and medication was discontinued about 2 weeks postpartum.  Patient notes that her blood pressure has been slightly elevated at office visits and at home.  Her OB/GYN provider recommended that she find a family physician.  Patient does sometimes have headaches associated with a high blood pressure.  She only checks her blood pressure at home when she does have a headache.  Notes that sometimes it runs between 150-160 systolic.  Denies blurry vision, chest pain or shortness of breath.    Hashimoto's - Follows with endocrinology.  Currently taking levothyroxine 175 mcg daily.    Abdominal pain/diarrhea - This is been a problem for the patient for several years.  Seems to have gotten significantly worse after pregnancy.  She notes that she will have short periods of abdominal cramping, bloating and diarrhea.  These episodes used to just be occasional, however been happening every 3 to 4 days since pregnancy.  She notes that food can have some effect on these episodes.  Foods that are causing her problems include eggs, greasy foods and dairy products.      Past Medical History:   Diagnosis Date   • Abdominal pain     generalized cramping after eating wheat type products   • Acquired hypothyroidism     Secondary to Hashimoto's thyroiditis   • Back pain affecting pregnancy in second trimester    • Dysmenorrhea    • Endogenous obesity    • Excessive or frequent menstruation    • Hashimoto's thyroiditis    • Headache    • Hypertension 11/01/2017    gestational hypertension   • Myopia    • Vitamin D deficiency        Past Surgical History:   Procedure  Laterality Date   • INJECTION OF MEDICATION  2012    Kenalog x2   • INJECTION OF MEDICATION  2013    Depo Provera (medroxyprogesterone acetate)   • INJECTION OF MEDICATION  2015    Toradol   • INJECTION OF MEDICATION  2015    Zofran   •  SECTION N/A 2017    Procedure:  SECTION PRIMARY;  Surgeon: Jayne Thibodeuax MD;  Location: [facility] LABOR DELIVERY;  Service:    •  SECTION N/A 2019    Procedure:  SECTION REPEAT;  Surgeon: Vazquez Davis DO;  Location: [facility] LABOR DELIVERY;  Service: Obstetrics   • WISDOM TOOTH EXTRACTION         Medications:  Current Outpatient Medications on File Prior to Visit   Medication Sig Dispense Refill   • levothyroxine (SYNTHROID, LEVOTHROID) 175 MCG tablet Take 1 tablet by mouth Daily. 30 tablet 11   • norgestimate-ethinyl estradiol (SPRINTEC 28) 0.25-35 MG-MCG per tablet Take 1 tablet by mouth Daily. 28 tablet 12   • Prenatal Vit-Fe Fumarate-FA (PRENATAL VITAMIN) 27-0.8 MG tablet Take 1 tablet by mouth Daily.     • acetaminophen (TYLENOL) 500 MG tablet Take 2 tablets by mouth Every 8 (Eight) Hours. 60 tablet 2   • cyanocobalamin 1000 MCG/ML injection INJECT 1 EACH AS DIRECTED TAKE AS DIRECTED. DAILY X 5, THEN WEEKLY FOR THE DURATION OF PREGNANCY 9 mL 1   • ibuprofen (ADVIL,MOTRIN) 800 MG tablet Take 1 tablet by mouth Every 8 (Eight) Hours. 60 tablet 2   • labetalol (NORMODYNE) 100 MG tablet Take 1 tablet by mouth 2 (Two) Times a Day. 60 tablet 6     No current facility-administered medications on file prior to visit.        Allergies   Allergen Reactions   • Cefzil [Cefprozil] Hives   • Celexa [Citalopram]      Patient states that she is not allergic to this med. It causes fatigue   • Influenza Vaccines      Migraines/ vomiting has reaction 2015- not in past        Family History   Problem Relation Age of Onset   • Asthma Other    • Hypertension Other    • Other Other         Thyroid Disorder, Depressive  Disorder, Smoking Tobacco, Anxiety   • Rheum arthritis Mother    • Hypertension Father    • Heart disease Maternal Grandfather    • Hypertension Maternal Grandfather    • Osteoporosis Paternal Grandmother    • Stroke Paternal Grandmother    • Anemia Paternal Grandmother    • Pulmonary embolism Maternal Grandmother        Social History     Socioeconomic History   • Marital status:      Spouse name: Not on file   • Number of children: Not on file   • Years of education: Not on file   • Highest education level: Not on file   Tobacco Use   • Smoking status: Never Smoker   • Smokeless tobacco: Never Used   Substance and Sexual Activity   • Alcohol use: No   • Drug use: No   • Sexual activity: Yes     Partners: Male     LMP: Has not had menstrual periods since delivery  Menstrual Periods: Prior to delivery were regular  Current birth control: She started taking Sprintec 2 weeks ago  Exercises regularly: no.    Health Maintenance   Topic Date Due   • INFLUENZA VACCINE  08/01/2019   • PAP SMEAR  05/19/2022   • TDAP/TD VACCINES (4 - Td) 11/20/2029       Problem list was reviewed and updated as appropriate.      Review of Systems   Constitutional: Negative for appetite change and unexpected weight change.   HENT: Negative for voice change.    Eyes: Negative for visual disturbance.   Respiratory: Negative for chest tightness and shortness of breath.         Snoring, no apnea or daytime sleepiness   Cardiovascular: Negative for chest pain and leg swelling.   Gastrointestinal: Positive for abdominal pain, diarrhea and vomiting. Negative for blood in stool and constipation.   Endocrine: Positive for heat intolerance. Negative for cold intolerance.   Genitourinary: Negative for difficulty urinating.   Musculoskeletal: Negative for back pain and myalgias.   Skin: Negative for rash.   Neurological: Negative for numbness and headaches.   Psychiatric/Behavioral: Negative for dysphoric mood and sleep disturbance.        "  Objective     /80   Pulse 69   Ht 165.1 cm (65\")   Wt 116 kg (256 lb 11.2 oz)   LMP 2019 (Approximate)   SpO2 99%   BMI 42.72 kg/m²   Physical Exam   Constitutional: She is oriented to person, place, and time. She appears well-developed and well-nourished. No distress.   HENT:   Head: Normocephalic and atraumatic.   Nose: Nose normal.   Mouth/Throat: Oropharynx is clear and moist.   Eyes: Pupils are equal, round, and reactive to light. Conjunctivae and EOM are normal.   Neck: Normal range of motion. Neck supple. No thyromegaly present.   Cardiovascular: Normal rate, regular rhythm and normal heart sounds.   No murmur heard.  Pulmonary/Chest: Effort normal and breath sounds normal. No respiratory distress. She has no wheezes. She has no rales.   Abdominal: Soft. Bowel sounds are normal. She exhibits no distension. There is no tenderness.   Transverse incision from  is clean dry and intact.   Musculoskeletal: Normal range of motion. She exhibits no edema or tenderness.   Lymphadenopathy:     She has no cervical adenopathy.   Neurological: She is alert and oriented to person, place, and time.   Skin: Skin is warm and dry. No rash noted.   Psychiatric: She has a normal mood and affect.   Vitals reviewed.      Lab Review   CBC from 2019 reviewed  TSH and free T4 from 2020 reviewed    Imaging  None         Assessment/Plan   Jerrica was seen today for establish care, hypertension and gi problem.    Diagnoses and all orders for this visit:    Elevated blood pressure reading without diagnosis of hypertension  Patient's blood pressure is normal in the office today.  Reviewed last office note from OB/GYN, which also showed normal blood pressure at that time (138/85).  Patient provided blood pressure log.  She was advised to check blood pressure daily at home for the next 2 weeks.  She should vary what time a day she is checking this measurement.  Will record values and bring with her to " next appointment.  Based on these home readings, and blood pressure at next office visit, will make decision about whether or not antihypertensive therapy needs to be started.  Patient advised lifestyle modifications to help with hypertension including increased physical activity and low-salt diet.    Hashimoto's disease  Continue to follow with endocrinology for management of thyroid disease.    Abdominal cramping  Diarrhea, unspecified type  Some concern for irritable bowel syndrome given abdominal cramping and diarrhea.  Symptoms have only been present/other symptom for the last couple of months.  Cannot rule out postpartum hormones having an effect, given that patient is only 7 weeks postpartum.  Dietary changes recommended, and patient given a handout on the FODMAPs diet.  Given the significance of her abdominal cramping, will provide Bentyl to be used as needed.  We will follow-up at next visit to see how these treatments are helping.  -     dicyclomine (BENTYL) 10 MG capsule; Take 1 capsule by mouth 4 (Four) Times a Day Before Meals & at Bedtime for 30 days.    Class 3 severe obesity with body mass index (BMI) of 40.0 to 44.9 in adult, unspecified obesity type, unspecified whether serious comorbidity present (CMS/HCC)  Patient's Body mass index is 42.72 kg/m². BMI is above normal parameters. Recommendations include: exercise counseling and nutrition counseling.    Encounter to establish care  With maintenance items reviewed.  Patient declines flu vaccine, and says that she has hives when she takes the flu vaccine.  She is up-to-date on Pap smear and Tdap vaccine.         Return in about 2 weeks (around 2/25/2020).             This document has been electronically signed by Nelsy Mancia MD on February 11, 2020 10:24 AM

## 2020-03-09 ENCOUNTER — OFFICE VISIT (OUTPATIENT)
Dept: ENDOCRINOLOGY | Facility: CLINIC | Age: 30
End: 2020-03-09

## 2020-03-09 ENCOUNTER — APPOINTMENT (OUTPATIENT)
Dept: LAB | Facility: HOSPITAL | Age: 30
End: 2020-03-09

## 2020-03-09 VITALS
OXYGEN SATURATION: 99 % | DIASTOLIC BLOOD PRESSURE: 74 MMHG | HEIGHT: 65 IN | SYSTOLIC BLOOD PRESSURE: 126 MMHG | HEART RATE: 78 BPM | BODY MASS INDEX: 42.52 KG/M2 | WEIGHT: 255.2 LBS

## 2020-03-09 DIAGNOSIS — E06.3 HASHIMOTO'S THYROIDITIS: Primary | ICD-10-CM

## 2020-03-09 DIAGNOSIS — E66.01 CLASS 3 SEVERE OBESITY WITH BODY MASS INDEX (BMI) OF 40.0 TO 44.9 IN ADULT, UNSPECIFIED OBESITY TYPE, UNSPECIFIED WHETHER SERIOUS COMORBIDITY PRESENT (HCC): ICD-10-CM

## 2020-03-09 DIAGNOSIS — E55.9 VITAMIN D DEFICIENCY: ICD-10-CM

## 2020-03-09 LAB
25(OH)D3 SERPL-MCNC: 24 NG/ML (ref 30–100)
ALBUMIN SERPL-MCNC: 4.2 G/DL (ref 3.5–5.2)
ALBUMIN/GLOB SERPL: 1.5 G/DL
ALP SERPL-CCNC: 74 U/L (ref 39–117)
ALT SERPL W P-5'-P-CCNC: 31 U/L (ref 1–33)
ANION GAP SERPL CALCULATED.3IONS-SCNC: 10.1 MMOL/L (ref 5–15)
AST SERPL-CCNC: 23 U/L (ref 1–32)
BASOPHILS # BLD AUTO: 0.03 10*3/MM3 (ref 0–0.2)
BASOPHILS NFR BLD AUTO: 0.4 % (ref 0–1.5)
BILIRUB SERPL-MCNC: 0.5 MG/DL (ref 0.2–1.2)
BUN BLD-MCNC: 11 MG/DL (ref 6–20)
BUN/CREAT SERPL: 14.9 (ref 7–25)
CALCIUM SPEC-SCNC: 9.2 MG/DL (ref 8.6–10.5)
CHLORIDE SERPL-SCNC: 103 MMOL/L (ref 98–107)
CO2 SERPL-SCNC: 24.9 MMOL/L (ref 22–29)
CREAT BLD-MCNC: 0.74 MG/DL (ref 0.57–1)
DEPRECATED RDW RBC AUTO: 37.6 FL (ref 37–54)
EOSINOPHIL # BLD AUTO: 0.08 10*3/MM3 (ref 0–0.4)
EOSINOPHIL NFR BLD AUTO: 1.2 % (ref 0.3–6.2)
ERYTHROCYTE [DISTWIDTH] IN BLOOD BY AUTOMATED COUNT: 12.5 % (ref 12.3–15.4)
GFR SERPL CREATININE-BSD FRML MDRD: 93 ML/MIN/1.73
GLOBULIN UR ELPH-MCNC: 2.8 GM/DL
GLUCOSE BLD-MCNC: 89 MG/DL (ref 65–99)
HCT VFR BLD AUTO: 37.8 % (ref 34–46.6)
HGB BLD-MCNC: 13.3 G/DL (ref 12–15.9)
IMM GRANULOCYTES # BLD AUTO: 0.03 10*3/MM3 (ref 0–0.05)
IMM GRANULOCYTES NFR BLD AUTO: 0.4 % (ref 0–0.5)
LYMPHOCYTES # BLD AUTO: 1.82 10*3/MM3 (ref 0.7–3.1)
LYMPHOCYTES NFR BLD AUTO: 26.8 % (ref 19.6–45.3)
MCH RBC QN AUTO: 29.3 PG (ref 26.6–33)
MCHC RBC AUTO-ENTMCNC: 35.2 G/DL (ref 31.5–35.7)
MCV RBC AUTO: 83.3 FL (ref 79–97)
MONOCYTES # BLD AUTO: 0.48 10*3/MM3 (ref 0.1–0.9)
MONOCYTES NFR BLD AUTO: 7.1 % (ref 5–12)
NEUTROPHILS # BLD AUTO: 4.35 10*3/MM3 (ref 1.7–7)
NEUTROPHILS NFR BLD AUTO: 64.1 % (ref 42.7–76)
NRBC BLD AUTO-RTO: 0 /100 WBC (ref 0–0.2)
PLATELET # BLD AUTO: 291 10*3/MM3 (ref 140–450)
PMV BLD AUTO: 10.1 FL (ref 6–12)
POTASSIUM BLD-SCNC: 4.6 MMOL/L (ref 3.5–5.2)
PROT SERPL-MCNC: 7 G/DL (ref 6–8.5)
RBC # BLD AUTO: 4.54 10*6/MM3 (ref 3.77–5.28)
SODIUM BLD-SCNC: 138 MMOL/L (ref 136–145)
TSH SERPL DL<=0.05 MIU/L-ACNC: 0.07 UIU/ML (ref 0.27–4.2)
VIT B12 BLD-MCNC: 644 PG/ML (ref 211–946)
WBC NRBC COR # BLD: 6.79 10*3/MM3 (ref 3.4–10.8)

## 2020-03-09 PROCEDURE — 82607 VITAMIN B-12: CPT | Performed by: NURSE PRACTITIONER

## 2020-03-09 PROCEDURE — 80053 COMPREHEN METABOLIC PANEL: CPT | Performed by: NURSE PRACTITIONER

## 2020-03-09 PROCEDURE — 82306 VITAMIN D 25 HYDROXY: CPT | Performed by: NURSE PRACTITIONER

## 2020-03-09 PROCEDURE — 36415 COLL VENOUS BLD VENIPUNCTURE: CPT | Performed by: NURSE PRACTITIONER

## 2020-03-09 PROCEDURE — 99214 OFFICE O/P EST MOD 30 MIN: CPT | Performed by: NURSE PRACTITIONER

## 2020-03-09 PROCEDURE — 85025 COMPLETE CBC W/AUTO DIFF WBC: CPT | Performed by: NURSE PRACTITIONER

## 2020-03-09 PROCEDURE — 84443 ASSAY THYROID STIM HORMONE: CPT | Performed by: NURSE PRACTITIONER

## 2020-03-09 NOTE — PROGRESS NOTES
Subjective    Jerrica Marquez is a 29 y.o. female. she is here today for follow-up.    History of Present Illness         29 year old comes for follow up      reason - hypothyroidism,due to Hashimoto's        Delivered Dec. 19, 2019       timing - constant     quality - not controlled     severity - moderate     Symptoms -- hair loss      alleviating - levothyroxine 175     Lab Results   Component Value Date    TSH 0.183 (L) 01/23/2020                      --     obesity   despite exercise  following 1800 calorie diet     --     h o vit D Deficiency, not on supplements                     Evaluation history:  TSH   Date Value Ref Range Status   01/23/2020 0.183 (L) 0.270 - 4.200 uIU/mL Final     Free T4   Date Value Ref Range Status   01/23/2020 1.62 0.93 - 1.70 ng/dL Final       Current medications:  Current Outpatient Medications   Medication Sig Dispense Refill   • dicyclomine (BENTYL) 10 MG capsule Take 1 capsule by mouth 4 (Four) Times a Day Before Meals & at Bedtime for 30 days. 120 capsule 0   • levothyroxine (SYNTHROID, LEVOTHROID) 175 MCG tablet Take 1 tablet by mouth Daily. 30 tablet 11   • norgestimate-ethinyl estradiol (SPRINTEC 28) 0.25-35 MG-MCG per tablet Take 1 tablet by mouth Daily. 28 tablet 12   • Prenatal Vit-Fe Fumarate-FA (PRENATAL VITAMIN) 27-0.8 MG tablet Take 1 tablet by mouth Daily.       No current facility-administered medications for this visit.        The following portions of the patient's history were reviewed and updated as appropriate:   Past Medical History:   Diagnosis Date   • Abdominal pain     generalized cramping after eating wheat type products   • Acquired hypothyroidism     Secondary to Hashimoto's thyroiditis   • Back pain affecting pregnancy in second trimester    • Dysmenorrhea    • Endogenous obesity    • Excessive or frequent menstruation    • Hashimoto's thyroiditis    • Headache    • Hypertension 11/01/2017    gestational hypertension   • Myopia    • Vitamin D  deficiency      Past Surgical History:   Procedure Laterality Date   •  SECTION N/A 2017    Procedure:  SECTION PRIMARY;  Surgeon: Jayne Thibodeaux MD;  Location: [facility] LABOR DELIVERY;  Service:    •  SECTION N/A 2019    Procedure:  SECTION REPEAT;  Surgeon: Vazquez Davis DO;  Location: [facility] LABOR DELIVERY;  Service: Obstetrics   • INJECTION OF MEDICATION  2013    Depo Provera (medroxyprogesterone acetate)   • INJECTION OF MEDICATION  2012    Kenalog x2   • INJECTION OF MEDICATION  2015    Toradol   • INJECTION OF MEDICATION  2015    Zofran   • WISDOM TOOTH EXTRACTION       Family History   Problem Relation Age of Onset   • Asthma Other    • Hypertension Other    • Other Other         Thyroid Disorder, Depressive Disorder, Smoking Tobacco, Anxiety   • Rheum arthritis Mother    • Hypertension Father    • Heart disease Maternal Grandfather    • Hypertension Maternal Grandfather    • Osteoporosis Paternal Grandmother    • Stroke Paternal Grandmother    • Anemia Paternal Grandmother    • Pulmonary embolism Maternal Grandmother      OB History        2    Para   2    Term   2            AB        Living   2       SAB        TAB        Ectopic        Molar        Multiple   0    Live Births   2              Allergies   Allergen Reactions   • Cefzil [Cefprozil] Hives   • Celexa [Citalopram]      Patient states that she is not allergic to this med. It causes fatigue   • Influenza Vaccines      Migraines/ vomiting has reaction 2015- not in past      Social History     Socioeconomic History   • Marital status:      Spouse name: Not on file   • Number of children: Not on file   • Years of education: Not on file   • Highest education level: Not on file   Tobacco Use   • Smoking status: Never Smoker   • Smokeless tobacco: Never Used   Substance and Sexual Activity   • Alcohol use: No   • Drug use: No   • Sexual activity: Yes      "Partners: Male       Review of Systems  Review of Systems   Constitutional: Negative for activity change, appetite change, diaphoresis and fatigue.   HENT: Negative for facial swelling, sneezing, sore throat, tinnitus, trouble swallowing and voice change.    Eyes: Negative for photophobia, pain, discharge, redness, itching and visual disturbance.   Respiratory: Negative for apnea, cough, choking, chest tightness and shortness of breath.    Cardiovascular: Negative for chest pain, palpitations and leg swelling.   Gastrointestinal: Negative for abdominal distention, abdominal pain, constipation, diarrhea, nausea and vomiting.   Endocrine: Negative for cold intolerance, heat intolerance, polydipsia, polyphagia and polyuria.   Genitourinary: Negative for difficulty urinating, dysuria, frequency, hematuria and urgency.   Musculoskeletal: Negative for arthralgias, back pain, gait problem, joint swelling, myalgias, neck pain and neck stiffness.   Skin: Negative for color change, pallor, rash and wound.   Neurological: Negative for dizziness, tremors, weakness, light-headedness, numbness and headaches.   Hematological: Negative for adenopathy. Does not bruise/bleed easily.   Psychiatric/Behavioral: Negative for behavioral problems, confusion and sleep disturbance.        Objective    /74   Pulse 78   Ht 165.1 cm (65\")   Wt 116 kg (255 lb 3.2 oz)   SpO2 99%   BMI 42.47 kg/m²   Physical Exam   Constitutional: She is oriented to person, place, and time. She appears well-developed and well-nourished. No distress.   HENT:   Head: Normocephalic and atraumatic.   Right Ear: External ear normal.   Left Ear: External ear normal.   Nose: Nose normal.   Eyes: Pupils are equal, round, and reactive to light. Conjunctivae and EOM are normal.   Neck: Normal range of motion. Neck supple. No tracheal deviation present. No thyromegaly present.   Cardiovascular: Normal rate, regular rhythm and normal heart sounds.   No murmur " heard.  Pulmonary/Chest: Effort normal and breath sounds normal. No respiratory distress. She has no wheezes.   Abdominal: Soft. Bowel sounds are normal. There is no tenderness. There is no rebound and no guarding.   Musculoskeletal: Normal range of motion. She exhibits no edema, tenderness or deformity.   Neurological: She is alert and oriented to person, place, and time. No cranial nerve deficit.   Skin: Skin is warm and dry. No rash noted.   Psychiatric: She has a normal mood and affect. Her behavior is normal. Judgment and thought content normal.       Lab Review  Lab Results   Component Value Date    TSH 0.183 (L) 01/23/2020     Lab Results   Component Value Date    FREET4 1.62 01/23/2020        Assessment/Plan      1. Hashimoto's thyroiditis    2. Vitamin D deficiency    3. Class 3 severe obesity with body mass index (BMI) of 40.0 to 44.9 in adult, unspecified obesity type, unspecified whether serious comorbidity present (CMS/[facility])    . This diagnosis was discussed and reviewed with the patient including the advantages of drug therapy.     1. Orders placed during this encounter include:  Orders Placed This Encounter   Procedures   • Comprehensive Metabolic Panel   • TSH   • Vitamin B12   • Vitamin D 25 Hydroxy   • CBC & Differential     Order Specific Question:   Manual Differential     Answer:   No       Medications prescribed:  Outpatient Encounter Medications as of 3/9/2020   Medication Sig Dispense Refill   • dicyclomine (BENTYL) 10 MG capsule Take 1 capsule by mouth 4 (Four) Times a Day Before Meals & at Bedtime for 30 days. 120 capsule 0   • levothyroxine (SYNTHROID, LEVOTHROID) 175 MCG tablet Take 1 tablet by mouth Daily. 30 tablet 11   • norgestimate-ethinyl estradiol (SPRINTEC 28) 0.25-35 MG-MCG per tablet Take 1 tablet by mouth Daily. 28 tablet 12   • Prenatal Vit-Fe Fumarate-FA (PRENATAL VITAMIN) 27-0.8 MG tablet Take 1 tablet by mouth Daily.       No facility-administered encounter medications on  file as of 3/9/2020.      Hypothyroidism due to hashimoto's                Taking 175 mcg daily and 1/2 on Sunday         Lab Results   Component Value Date    TSH 0.183 (L) 01/23/2020             ====     Vit D Def , start 50 th q 2 w                    Component      Latest Ref Rng & Units 8/7/2018   25 Hydroxy, Vitamin D      30.0 - 100.0 ng/ml 23.8 (L)       stopped vitamin d      Taking prenatal            ====     B12 nl      Lab Results   Component Value Date    [id number] 238 10/25/2019                --------------     stomach pain after eating breads and other foods     will check for celiac --negative        Weight Management      Patient's Body mass index is 42.47 kg/m². BMI is above normal parameters. Recommendations include: nutrition counseling.    Decrease daily caloric intake by 500 calories per day              4. Return in about 3 months (around 6/9/2020) for Recheck.

## 2020-03-10 ENCOUNTER — TELEPHONE (OUTPATIENT)
Dept: ENDOCRINOLOGY | Facility: CLINIC | Age: 30
End: 2020-03-10

## 2020-03-10 NOTE — TELEPHONE ENCOUNTER
----- Message from REBECA Blancas sent at 3/10/2020  8:00 AM CDT -----  Thyroid is overactive; she is taking 175 mcg daily and 1/2 on Sunday don't take any on Sundays . Vitamin d low be sure taking supplement

## 2020-05-20 ENCOUNTER — APPOINTMENT (OUTPATIENT)
Dept: ULTRASOUND IMAGING | Facility: HOSPITAL | Age: 30
End: 2020-05-20

## 2020-05-20 ENCOUNTER — NURSE TRIAGE (OUTPATIENT)
Dept: CALL CENTER | Facility: HOSPITAL | Age: 30
End: 2020-05-20

## 2020-05-20 ENCOUNTER — HOSPITAL ENCOUNTER (EMERGENCY)
Facility: HOSPITAL | Age: 30
Discharge: HOME | End: 2020-05-20
Attending: EMERGENCY MEDICINE | Admitting: EMERGENCY MEDICINE

## 2020-05-20 ENCOUNTER — APPOINTMENT (OUTPATIENT)
Dept: CT IMAGING | Facility: HOSPITAL | Age: 30
End: 2020-05-20

## 2020-05-20 VITALS
WEIGHT: 253 LBS | OXYGEN SATURATION: 97 % | SYSTOLIC BLOOD PRESSURE: 143 MMHG | TEMPERATURE: 98.1 F | BODY MASS INDEX: 42.15 KG/M2 | DIASTOLIC BLOOD PRESSURE: 87 MMHG | HEART RATE: 120 BPM | RESPIRATION RATE: 18 BRPM | HEIGHT: 65 IN

## 2020-05-20 DIAGNOSIS — N83.202 CYSTS OF BOTH OVARIES: ICD-10-CM

## 2020-05-20 DIAGNOSIS — N30.01 ACUTE CYSTITIS WITH HEMATURIA: Primary | ICD-10-CM

## 2020-05-20 DIAGNOSIS — N83.201 CYSTS OF BOTH OVARIES: ICD-10-CM

## 2020-05-20 LAB
ALBUMIN SERPL-MCNC: 3.9 G/DL (ref 3.5–5.2)
ALBUMIN/GLOB SERPL: 1.4 G/DL
ALP SERPL-CCNC: 68 U/L (ref 39–117)
ALT SERPL W P-5'-P-CCNC: 23 U/L (ref 1–33)
AMPHET+METHAMPHET UR QL: NEGATIVE
AMPHETAMINES UR QL: NEGATIVE
ANION GAP SERPL CALCULATED.3IONS-SCNC: 14 MMOL/L (ref 5–15)
AST SERPL-CCNC: 23 U/L (ref 1–32)
B-HCG UR QL: NEGATIVE
BACTERIA UR QL AUTO: ABNORMAL /HPF
BARBITURATES UR QL SCN: NEGATIVE
BASOPHILS # BLD AUTO: 0.04 10*3/MM3 (ref 0–0.2)
BASOPHILS NFR BLD AUTO: 0.3 % (ref 0–1.5)
BENZODIAZ UR QL SCN: NEGATIVE
BILIRUB SERPL-MCNC: 0.3 MG/DL (ref 0.2–1.2)
BILIRUB UR QL STRIP: NEGATIVE
BUN BLD-MCNC: 11 MG/DL (ref 6–20)
BUN/CREAT SERPL: 14.7 (ref 7–25)
BUPRENORPHINE SERPL-MCNC: NEGATIVE NG/ML
CALCIUM SPEC-SCNC: 8.5 MG/DL (ref 8.6–10.5)
CANNABINOIDS SERPL QL: NEGATIVE
CHLORIDE SERPL-SCNC: 100 MMOL/L (ref 98–107)
CLARITY UR: ABNORMAL
CO2 SERPL-SCNC: 24 MMOL/L (ref 22–29)
COCAINE UR QL: NEGATIVE
COLOR UR: YELLOW
CREAT BLD-MCNC: 0.75 MG/DL (ref 0.57–1)
D-LACTATE SERPL-SCNC: 2.1 MMOL/L (ref 0.5–2)
DEPRECATED RDW RBC AUTO: 37.2 FL (ref 37–54)
EOSINOPHIL # BLD AUTO: 0.03 10*3/MM3 (ref 0–0.4)
EOSINOPHIL NFR BLD AUTO: 0.2 % (ref 0.3–6.2)
ERYTHROCYTE [DISTWIDTH] IN BLOOD BY AUTOMATED COUNT: 12.3 % (ref 12.3–15.4)
GFR SERPL CREATININE-BSD FRML MDRD: 92 ML/MIN/1.73
GLOBULIN UR ELPH-MCNC: 2.8 GM/DL
GLUCOSE BLD-MCNC: 134 MG/DL (ref 65–99)
GLUCOSE UR STRIP-MCNC: NEGATIVE MG/DL
HCT VFR BLD AUTO: 36.7 % (ref 34–46.6)
HGB BLD-MCNC: 12.6 G/DL (ref 12–15.9)
HGB UR QL STRIP.AUTO: ABNORMAL
HOLD SPECIMEN: NORMAL
HOLD SPECIMEN: NORMAL
HYALINE CASTS UR QL AUTO: ABNORMAL /LPF
IMM GRANULOCYTES # BLD AUTO: 0.1 10*3/MM3 (ref 0–0.05)
IMM GRANULOCYTES NFR BLD AUTO: 0.7 % (ref 0–0.5)
KETONES UR QL STRIP: NEGATIVE
LEUKOCYTE ESTERASE UR QL STRIP.AUTO: ABNORMAL
LIPASE SERPL-CCNC: 16 U/L (ref 13–60)
LYMPHOCYTES # BLD AUTO: 0.88 10*3/MM3 (ref 0.7–3.1)
LYMPHOCYTES NFR BLD AUTO: 5.8 % (ref 19.6–45.3)
MCH RBC QN AUTO: 29 PG (ref 26.6–33)
MCHC RBC AUTO-ENTMCNC: 34.3 G/DL (ref 31.5–35.7)
MCV RBC AUTO: 84.6 FL (ref 79–97)
METHADONE UR QL SCN: NEGATIVE
MONOCYTES # BLD AUTO: 0.64 10*3/MM3 (ref 0.1–0.9)
MONOCYTES NFR BLD AUTO: 4.2 % (ref 5–12)
NEUTROPHILS # BLD AUTO: 13.39 10*3/MM3 (ref 1.7–7)
NEUTROPHILS NFR BLD AUTO: 88.8 % (ref 42.7–76)
NITRITE UR QL STRIP: NEGATIVE
NRBC BLD AUTO-RTO: 0 /100 WBC (ref 0–0.2)
OPIATES UR QL: NEGATIVE
OXYCODONE UR QL SCN: NEGATIVE
PCP UR QL SCN: NEGATIVE
PH UR STRIP.AUTO: 6 [PH] (ref 5–9)
PLATELET # BLD AUTO: 225 10*3/MM3 (ref 140–450)
PMV BLD AUTO: 9.6 FL (ref 6–12)
POTASSIUM BLD-SCNC: 3.6 MMOL/L (ref 3.5–5.2)
PROPOXYPH UR QL: NEGATIVE
PROT SERPL-MCNC: 6.7 G/DL (ref 6–8.5)
PROT UR QL STRIP: ABNORMAL
RBC # BLD AUTO: 4.34 10*6/MM3 (ref 3.77–5.28)
RBC # UR: ABNORMAL /HPF
REF LAB TEST METHOD: ABNORMAL
SODIUM BLD-SCNC: 138 MMOL/L (ref 136–145)
SP GR UR STRIP: 1.03 (ref 1–1.03)
SQUAMOUS #/AREA URNS HPF: ABNORMAL /HPF
T4 FREE SERPL-MCNC: 1.37 NG/DL (ref 0.93–1.7)
TRICYCLICS UR QL SCN: NEGATIVE
TSH SERPL DL<=0.05 MIU/L-ACNC: 0.72 UIU/ML (ref 0.27–4.2)
UROBILINOGEN UR QL STRIP: ABNORMAL
WBC NRBC COR # BLD: 15.08 10*3/MM3 (ref 3.4–10.8)
WBC UR QL AUTO: ABNORMAL /HPF
WHOLE BLOOD HOLD SPECIMEN: NORMAL
WHOLE BLOOD HOLD SPECIMEN: NORMAL

## 2020-05-20 PROCEDURE — 76830 TRANSVAGINAL US NON-OB: CPT

## 2020-05-20 PROCEDURE — 83605 ASSAY OF LACTIC ACID: CPT | Performed by: PHYSICIAN ASSISTANT

## 2020-05-20 PROCEDURE — 36415 COLL VENOUS BLD VENIPUNCTURE: CPT

## 2020-05-20 PROCEDURE — 74177 CT ABD & PELVIS W/CONTRAST: CPT

## 2020-05-20 PROCEDURE — 96360 HYDRATION IV INFUSION INIT: CPT

## 2020-05-20 PROCEDURE — 81001 URINALYSIS AUTO W/SCOPE: CPT | Performed by: PHYSICIAN ASSISTANT

## 2020-05-20 PROCEDURE — 99284 EMERGENCY DEPT VISIT MOD MDM: CPT

## 2020-05-20 PROCEDURE — 84439 ASSAY OF FREE THYROXINE: CPT | Performed by: PHYSICIAN ASSISTANT

## 2020-05-20 PROCEDURE — 80306 DRUG TEST PRSMV INSTRMNT: CPT | Performed by: PHYSICIAN ASSISTANT

## 2020-05-20 PROCEDURE — 87147 CULTURE TYPE IMMUNOLOGIC: CPT | Performed by: EMERGENCY MEDICINE

## 2020-05-20 PROCEDURE — 83690 ASSAY OF LIPASE: CPT | Performed by: PHYSICIAN ASSISTANT

## 2020-05-20 PROCEDURE — 93976 VASCULAR STUDY: CPT

## 2020-05-20 PROCEDURE — 87661 TRICHOMONAS VAGINALIS AMPLIF: CPT | Performed by: PHYSICIAN ASSISTANT

## 2020-05-20 PROCEDURE — 87086 URINE CULTURE/COLONY COUNT: CPT | Performed by: EMERGENCY MEDICINE

## 2020-05-20 PROCEDURE — 84443 ASSAY THYROID STIM HORMONE: CPT | Performed by: PHYSICIAN ASSISTANT

## 2020-05-20 PROCEDURE — 87591 N.GONORRHOEAE DNA AMP PROB: CPT | Performed by: PHYSICIAN ASSISTANT

## 2020-05-20 PROCEDURE — 80053 COMPREHEN METABOLIC PANEL: CPT | Performed by: PHYSICIAN ASSISTANT

## 2020-05-20 PROCEDURE — 85025 COMPLETE CBC W/AUTO DIFF WBC: CPT | Performed by: PHYSICIAN ASSISTANT

## 2020-05-20 PROCEDURE — 25010000002 IOPAMIDOL 61 % SOLUTION: Performed by: EMERGENCY MEDICINE

## 2020-05-20 PROCEDURE — 87491 CHLMYD TRACH DNA AMP PROBE: CPT | Performed by: PHYSICIAN ASSISTANT

## 2020-05-20 PROCEDURE — 81025 URINE PREGNANCY TEST: CPT | Performed by: PHYSICIAN ASSISTANT

## 2020-05-20 PROCEDURE — 87040 BLOOD CULTURE FOR BACTERIA: CPT | Performed by: PHYSICIAN ASSISTANT

## 2020-05-20 RX ORDER — NITROFURANTOIN 25; 75 MG/1; MG/1
100 CAPSULE ORAL ONCE
Status: COMPLETED | OUTPATIENT
Start: 2020-05-20 | End: 2020-05-20

## 2020-05-20 RX ORDER — SODIUM CHLORIDE 0.9 % (FLUSH) 0.9 %
10 SYRINGE (ML) INJECTION AS NEEDED
Status: DISCONTINUED | OUTPATIENT
Start: 2020-05-20 | End: 2020-05-21 | Stop reason: HOSPADM

## 2020-05-20 RX ORDER — NITROFURANTOIN 25; 75 MG/1; MG/1
100 CAPSULE ORAL 2 TIMES DAILY
Qty: 10 CAPSULE | Refills: 0 | Status: SHIPPED | OUTPATIENT
Start: 2020-05-20 | End: 2020-05-25

## 2020-05-20 RX ADMIN — SODIUM CHLORIDE 1000 ML: 900 INJECTION, SOLUTION INTRAVENOUS at 19:48

## 2020-05-20 RX ADMIN — IOPAMIDOL 85 ML: 612 INJECTION, SOLUTION INTRAVENOUS at 20:26

## 2020-05-20 RX ADMIN — NITROFURANTOIN MONOHYDRATE/MACROCRYSTALLINE 100 MG: 25; 75 CAPSULE ORAL at 23:17

## 2020-05-20 NOTE — TELEPHONE ENCOUNTER
"    Reason for Disposition  • [1] SEVERE pain (e.g., excruciating) AND [2] present > 1 hour    Additional Information  • Negative: Shock suspected (e.g., cold/pale/clammy skin, too weak to stand, low BP, rapid pulse)  • Negative: Difficult to awaken or acting confused (e.g., disoriented, slurred speech)  • Negative: Passed out (i.e., lost consciousness, collapsed and was not responding)  • Negative: Sounds like a life-threatening emergency to the triager  • Negative: Chest pain  • Negative: Pain is mainly in upper abdomen  (if needed ask: \"is it mainly above the belly button?\")  • Negative: Followed an abdomen (stomach) injury  • Negative: [1] Abdominal pain AND [2] pregnant < 20 weeks  • Negative: [1] Abdominal pain AND [2] pregnant > 20 weeks  • Negative: [1] Abdominal pain AND [2] postpartum (from 0 to 6 weeks after delivery)    Answer Assessment - Initial Assessment Questions  1. LOCATION: \"Where does it hurt?\"       Right side   2. RADIATION: \"Does the pain shoot anywhere else?\" (e.g., chest, back)      Pelvic area to hips   3. ONSET: \"When did the pain begin?\" (e.g., minutes, hours or days ago)       Earlier today   4. SUDDEN: \"Gradual or sudden onset?\"      Sudden   5. PATTERN \"Does the pain come and go, or is it constant?\"     - If constant: \"Is it getting better, staying the same, or worsening?\"       (Note: Constant means the pain never goes away completely; most serious pain is constant and it progresses)      - If intermittent: \"How long does it last?\" \"Do you have pain now?\"      (Note: Intermittent means the pain goes away completely between bouts)      constantt   6. SEVERITY: \"How bad is the pain?\"  (e.g., Scale 1-10; mild, moderate, or severe)    - MILD (1-3): doesn't interfere with normal activities, abdomen soft and not tender to touch     - MODERATE (4-7): interferes with normal activities or awakens from sleep, tender to touch     - SEVERE (8-10): excruciating pain, doubled over, unable to do " "any normal activities       Severe   7. RECURRENT SYMPTOM: \"Have you ever had this type of abdominal pain before?\" If so, ask: \"When was the last time?\" and \"What happened that time?\"       No   8. CAUSE: \"What do you think is causing the abdominal pain?\"      Unsure   9. RELIEVING/AGGRAVATING FACTORS: \"What makes it better or worse?\" (e.g., movement, antacids, bowel movement)      Nothing helps   10. OTHER SYMPTOMS: \"Has there been any vomiting, diarrhea, constipation, or urine problems?\"        Fever   11. PREGNANCY: \"Is there any chance you are pregnant?\" \"When was your last menstrual period?\"        No    Protocols used: ABDOMINAL PAIN - FEMALE-ADULT-AH      "

## 2020-05-21 LAB
BACTERIA SPEC AEROBE CULT: ABNORMAL
C TRACH RRNA CVX QL NAA+PROBE: NEGATIVE
LACTATE HOLD SPECIMEN: NORMAL
N GONORRHOEA RRNA SPEC QL NAA+PROBE: NEGATIVE
TRICHOMONAS VAGINALIS PCR: NEGATIVE

## 2020-05-21 NOTE — ED PROVIDER NOTES
Subjective   Patient presents to emergency department for abdominal pain starting today.  States she is currently on her menstrual cycle.  States earlier today she felt a pop in her left lower abdomen followed by some pain which initially went away.  Endorses fever at home of 103F she took some Tylenol before she came here.  States pain is now across her entire lower abdomen.       History provided by:  Patient   used: No        Review of Systems   Constitutional: Positive for fever. Negative for chills.   HENT: Negative for sore throat and trouble swallowing.    Eyes: Negative for visual disturbance.   Respiratory: Negative for shortness of breath and wheezing.    Cardiovascular: Negative for chest pain.   Gastrointestinal: Positive for abdominal pain. Negative for nausea and vomiting.   Endocrine: Negative for polyuria.   Genitourinary: Positive for vaginal bleeding (on menses). Negative for vaginal discharge.   Musculoskeletal: Negative for back pain.   Allergic/Immunologic: Negative for immunocompromised state.   Neurological: Negative for syncope and weakness.   Hematological: Does not bruise/bleed easily.   Psychiatric/Behavioral: Negative for confusion.       Past Medical History:   Diagnosis Date   • Abdominal pain     generalized cramping after eating wheat type products   • Acquired hypothyroidism     Secondary to Hashimoto's thyroiditis   • Back pain affecting pregnancy in second trimester    • Dysmenorrhea    • Endogenous obesity    • Excessive or frequent menstruation    • Hashimoto's thyroiditis    • Headache    • Hypertension 11/01/2017    gestational hypertension   • Myopia    • Vitamin D deficiency        Allergies   Allergen Reactions   • Cefzil [Cefprozil] Hives   • Celexa [Citalopram]      Patient states that she is not allergic to this med. It causes fatigue   • Influenza Vaccines      Migraines/ vomiting has reaction 2015- not in past        Past Surgical History:  "  Procedure Laterality Date   •  SECTION N/A 2017    Procedure:  SECTION PRIMARY;  Surgeon: Jayne Thibodeaux MD;  Location: [facility] LABOR DELIVERY;  Service:    •  SECTION N/A 2019    Procedure:  SECTION REPEAT;  Surgeon: Vazquez Davis DO;  Location: [facility] LABOR DELIVERY;  Service: Obstetrics   • INJECTION OF MEDICATION  2013    Depo Provera (medroxyprogesterone acetate)   • INJECTION OF MEDICATION  2012    Kenalog x2   • INJECTION OF MEDICATION  2015    Toradol   • INJECTION OF MEDICATION  2015    Zofran   • WISDOM TOOTH EXTRACTION         Family History   Problem Relation Age of Onset   • Asthma Other    • Hypertension Other    • Other Other         Thyroid Disorder, Depressive Disorder, Smoking Tobacco, Anxiety   • Rheum arthritis Mother    • Hypertension Father    • Heart disease Maternal Grandfather    • Hypertension Maternal Grandfather    • Osteoporosis Paternal Grandmother    • Stroke Paternal Grandmother    • Anemia Paternal Grandmother    • Pulmonary embolism Maternal Grandmother        Social History     Socioeconomic History   • Marital status:      Spouse name: Not on file   • Number of children: Not on file   • Years of education: Not on file   • Highest education level: Not on file   Tobacco Use   • Smoking status: Never Smoker   • Smokeless tobacco: Never Used   Substance and Sexual Activity   • Alcohol use: No   • Drug use: No   • Sexual activity: Yes     Partners: Male           Objective      /87 (BP Location: Left arm, Patient Position: Lying)   Pulse 120   Temp 98.1 °F (36.7 °C) (Temporal)   Resp 18   Ht 165.1 cm (65\")   Wt 115 kg (253 lb)   LMP 2020   SpO2 97%   BMI 42.10 kg/m²     Physical Exam   Constitutional: She is oriented to person, place, and time. She appears well-developed and well-nourished.  Non-toxic appearance. She does not appear ill. No distress.   HENT:   Head: Normocephalic " and atraumatic.   Eyes: Conjunctivae are normal.   Cardiovascular: Regular rhythm and normal heart sounds.   Tachycardia     Pulmonary/Chest: Effort normal and breath sounds normal. No respiratory distress. She has no wheezes.   Abdominal: Soft. She exhibits no distension. There is tenderness (diffuse lower abdomen/suprapubic).   Musculoskeletal: She exhibits no edema.   Neurological: She is alert and oriented to person, place, and time.   Skin: Skin is warm. Capillary refill takes less than 2 seconds.   Psychiatric: She has a normal mood and affect. Her behavior is normal. Thought content normal.   Nursing note and vitals reviewed.      Procedures  none         ED Course      Labs Reviewed   COMPREHENSIVE METABOLIC PANEL - Abnormal; Notable for the following components:       Result Value    Glucose 134 (*)     Calcium 8.5 (*)     All other components within normal limits    Narrative:     GFR Normal >60  Chronic Kidney Disease <60  Kidney Failure <15     URINALYSIS W/ MICROSCOPIC IF INDICATED (NO CULTURE) - Abnormal; Notable for the following components:    Appearance, UA Turbid (*)     Blood, UA Large (3+) (*)     Protein, UA Trace (*)     Leuk Esterase, UA Trace (*)     All other components within normal limits   CBC WITH AUTO DIFFERENTIAL - Abnormal; Notable for the following components:    WBC 15.08 (*)     Neutrophil % 88.8 (*)     Lymphocyte % 5.8 (*)     Monocyte % 4.2 (*)     Eosinophil % 0.2 (*)     Immature Grans % 0.7 (*)     Neutrophils, Absolute 13.39 (*)     Immature Grans, Absolute 0.10 (*)     All other components within normal limits   URINALYSIS, MICROSCOPIC ONLY - Abnormal; Notable for the following components:    RBC, UA Too Numerous to Count (*)     WBC, UA 6-12 (*)     Bacteria, UA Trace (*)     All other components within normal limits   LACTIC ACID, PLASMA - Abnormal; Notable for the following components:    Lactate 2.1 (*)     All other components within normal limits   LIPASE - Normal    PREGNANCY, URINE - Normal   TSH - Normal   T4, FREE - Normal    Narrative:     Results may be falsely increased if patient taking Biotin.     URINE DRUG SCREEN - Normal    Narrative:     Cutoff For Drugs Screened:    Amphetamines               500 ng/ml  Barbiturates               200 ng/ml  Benzodiazepines            150 ng/ml  Cocaine                    150 ng/ml  Methadone                  200 ng/ml  Opiates                    100 ng/ml  Phencyclidine               25 ng/ml  THC                            50 ng/ml  Methamphetamine            500 ng/ml  Tricyclic Antidepressants  300 ng/ml  Oxycodone                  100 ng/ml  Propoxyphene               300 ng/ml  Buprenorphine               10 ng/ml    The normal value for all drugs tested is negative. This report includes unconfirmed screening results, with the cutoff values listed, to be used for medical treatment purposes only.  Unconfirmed results must not be used for non-medical purposes such as employment or legal testing.  Clinical consideration should be applied to any drug of abuse test, particularly when unconfirmed results are used.     BLOOD CULTURE   BLOOD CULTURE   CHLAMYDIA TRACHOMATIS, NEISSERIA GONORRHOEAE, TRICHOMONAS VAGINALIS, PCR   URINE CULTURE   RAINBOW DRAW    Narrative:     The following orders were created for panel order [place] Draw.  Procedure                               Abnormality         Status                     ---------                               -----------         ------                     Light Blue Top[[id number]]                                   Final result               Green Top (Gel)[[id number]]                                  Final result               Lavender Top[[id number]]                                     Final result               Gold Top - SST[[id number]]                                   Final result                 Please view results for these tests on the individual orders.   LACTIC ACID REFLEX  TIMER   CBC AND DIFFERENTIAL    Narrative:     The following orders were created for panel order CBC & Differential.  Procedure                               Abnormality         Status                     ---------                               -----------         ------                     CBC Auto Differential[[id number]]        Abnormal            Final result                 Please view results for these tests on the individual orders.   LIGHT BLUE TOP   GREEN TOP   LAVENDER TOP   GOLD TOP - SST     Us Non-ob Transvaginal    Result Date: 5/20/2020  Narrative: EXAM:   US Pelvis Transvaginal and US Duplex Arterial/Venous of the Pelvis, Complete CLINICAL HISTORY:   The patient is 29 years old and is Female; left sided lower abdominal pain, rule out ovarian torsion TECHNIQUE:   Real-time transvaginal pelvic ultrasound with image documentation.  Transvaginal imaging was used for better evaluation of the endometrium and adnexa.  Real-time duplex ultrasound scan of the arterial and venous flow of the pelvis with color Doppler flow and spectral waveform analysis. COMPARISON:   No relevant prior studies available. FINDINGS:   UTERUS/CERVIX:  The uterus measures 10.6 x 7.7 x 4.9 cm.  The endometrium measures approximately 0.9 cm. Fluid is present within the endometrial canal.  No myometrial mass.   RIGHT OVARY:  The right ovary measures 4.4 x 3.1 x 2.8 cm. Normal arterial and venous color Doppler and spectral waveform is present. Right ovary demonstrates multiple peripheral follicular ovarian cysts, measuring less than 1 cm.  No torsion. No follow-up imaging is recommended.   LEFT OVARY:  The left ovary measures 3.8 x 2.8 x 2.7 cm. Normal arterial and venous color Doppler and spectral waveform is present. Left ovary demonstrates multiple peripheral follicular ovarian cysts, measuring less than 1 cm.  No torsion. No follow-up imaging is recommended.   FREE FLUID:  No free fluid.   BLADDER:  Empty bladder which cannot be  evaluated with this probe.     Impression:   No evidence of torsion. Electronically signed by:  Keysha Luciano MD  5/20/2020 10:53 PM CDT Workstation: [id number]    Ct Abdomen Pelvis With Contrast    Result Date: 5/20/2020  Narrative: PROCEDURE: CT ABDOMEN PELVIS W CONTRAST INDICATION: Lower abdominal pain, fever COMPARISON: None  TECHNIQUE:  - reconstructions:  Axial, coronal, sagittal  - contrast:  oral:  No     intravenous: 85 mL of Isovue-300 This exam was performed according to our departmental dose-optimization program, which includes automated exposure control, adjustment of the mA and/or kV according to patient size and/or use of iterative reconstruction technique. DLP is 909.7 FINDINGS:   - - - CT ABDOMEN - - -   THORAX (INFERIOR):  - LUNG BASES:  Clear  - PLEURA:  No fluid or mass  - HEART: Normal size, no pericardial fluid  - MISC:  N/A   ABDOMEN:  - LIVER:  Normal size/contour, no ductal dilatation, no focal lesion  - GB:  Grossly negative  - CBD:  Grossly negative  - SPLEEN:  Normal size and contour  - PANCREAS:  Normal in size, contour, no focal mass  - VISCERA:  Normal caliber, no wall thickening  - MESENTERY: No mesenteric mass  - CAVITY:  No free abdominal fluid, no free intraperitoneal air  - BODY WALL:  Tiny umbilical hernia contains only fat  - OSSEOUS:  Grossly negative for age  - MISC:  RETROPERITONEUM:  - KIDNEYS:Normal size/contour, no collecting system dilation                   No evidence of an enhancing mass  - URETERS:  Normal course, caliber  - ADRENALS:  Normal size, contour  - MISC:  No sig. retroperitoneal adenopathy or mass  - VASCULAR:  Aorta / iliacs: wnl for age  - - - CT PELVIS - - -  - VISCERA:  Normal caliber small/large bowel, no focal thickening/mass      - APPENDIX: Within normal limits  - MESENTERY:  No mass  - VASCULAR:  Within normal limits for age  - CAVITY:  No free fluid / air  - BLADDER:  Unremarkable  - OSSEOUS:  Within normal limits  - MISC:  - UTERUS/OVARY: Grossly  unremarkable      Impression: 1. No acute abnormality identified. Specifically, the appendix is visualized and appears normal. 2. Tiny umbilical hernia contains only fat 3. Please see findings section above for further details Electronically signed by:  Cecy Taveras MD  5/20/2020 8:57 PM CDT Workstation: [id number]    Us Testicular Or Ovarian Vascular Limited    Result Date: 5/20/2020  Narrative: EXAM:   US Pelvis Transvaginal and US Duplex Arterial/Venous of the Pelvis, Complete CLINICAL HISTORY:   The patient is 29 years old and is Female; left sided lower abdominal pain, rule out ovarian torsion TECHNIQUE:   Real-time transvaginal pelvic ultrasound with image documentation.  Transvaginal imaging was used for better evaluation of the endometrium and adnexa.  Real-time duplex ultrasound scan of the arterial and venous flow of the pelvis with color Doppler flow and spectral waveform analysis. COMPARISON:   No relevant prior studies available. FINDINGS:   UTERUS/CERVIX:  The uterus measures 10.6 x 7.7 x 4.9 cm.  The endometrium measures approximately 0.9 cm. Fluid is present within the endometrial canal.  No myometrial mass.   RIGHT OVARY:  The right ovary measures 4.4 x 3.1 x 2.8 cm. Normal arterial and venous color Doppler and spectral waveform is present. Right ovary demonstrates multiple peripheral follicular ovarian cysts, measuring less than 1 cm.  No torsion. No follow-up imaging is recommended.   LEFT OVARY:  The left ovary measures 3.8 x 2.8 x 2.7 cm. Normal arterial and venous color Doppler and spectral waveform is present. Left ovary demonstrates multiple peripheral follicular ovarian cysts, measuring less than 1 cm.  No torsion. No follow-up imaging is recommended.   FREE FLUID:  No free fluid.   BLADDER:  Empty bladder which cannot be evaluated with this probe.     Impression:   No evidence of torsion. Electronically signed by:  Keysha Luciano MD  5/20/2020 10:53 PM CDT Workstation: [id number]           MDM  Number of Diagnoses or Management Options  Acute cystitis with hematuria:   Cysts of both ovaries:      Amount and/or Complexity of Data Reviewed  Clinical lab tests: reviewed  Tests in the radiology section of CPT®: reviewed    Patient Progress  Patient progress: stable    Patient signed out to me at shift change by the PA.  Patient was awaiting results of ultrasound imaging.  No evidence of torsion.  Bilateral ovarian cyst.  Patient advised that pain was likely from a ruptured ovarian cyst.  She is feeling better at time of discharge.  She does describe some dysuria.  UA is indeterminate but with symptoms urine culture is sent and patient is started on Macrobid.  Will follow-up with OB/GYN.    Final diagnoses:   Acute cystitis with hematuria   Cysts of both ovaries            Ace Peña,   05/21/20 0526

## 2020-05-21 NOTE — DISCHARGE INSTRUCTIONS
Please return with new or worsening symptoms.  Get antibiotic filled take as directed for the complete course.  Follow-up with OB/GYN as discussed.

## 2020-05-25 LAB
BACTERIA SPEC AEROBE CULT: NORMAL
BACTERIA SPEC AEROBE CULT: NORMAL

## 2020-06-09 ENCOUNTER — OFFICE VISIT (OUTPATIENT)
Dept: ENDOCRINOLOGY | Facility: CLINIC | Age: 30
End: 2020-06-09

## 2020-06-09 VITALS
HEIGHT: 65 IN | SYSTOLIC BLOOD PRESSURE: 120 MMHG | TEMPERATURE: 98.5 F | OXYGEN SATURATION: 98 % | BODY MASS INDEX: 43.28 KG/M2 | WEIGHT: 259.8 LBS | DIASTOLIC BLOOD PRESSURE: 82 MMHG | HEART RATE: 84 BPM

## 2020-06-09 DIAGNOSIS — E06.3 HASHIMOTO'S THYROIDITIS: Primary | ICD-10-CM

## 2020-06-09 DIAGNOSIS — E66.01 CLASS 3 SEVERE OBESITY WITH BODY MASS INDEX (BMI) OF 40.0 TO 44.9 IN ADULT, UNSPECIFIED OBESITY TYPE, UNSPECIFIED WHETHER SERIOUS COMORBIDITY PRESENT (HCC): ICD-10-CM

## 2020-06-09 DIAGNOSIS — E55.9 VITAMIN D DEFICIENCY: ICD-10-CM

## 2020-06-09 PROCEDURE — 99214 OFFICE O/P EST MOD 30 MIN: CPT | Performed by: NURSE PRACTITIONER

## 2020-06-09 NOTE — PROGRESS NOTES
Subjective    Jerrica Marquez is a 29 y.o. female. she is here today for follow-up.    History of Present Illness       IN OFFICE VISIT         29 year old comes for follow up      reason - hypothyroidism,due to Hashimoto's        Delivered Dec. 19, 2019         timing - constant     quality - controlled      severity - moderate     Symptoms -- depression , anxiety      alleviating - levothyroxine 175     aggravating factors none            Lab Results   Component Value Date    TSH 0.720 05/20/2020                --     obesity   despite exercise  following 1800 calorie diet     --     h o vit D Deficiency, not on supplements        Component      Latest Ref Rng & Units 3/9/2020   25 Hydroxy, Vitamin D      30.0 - 100.0 ng/ml 24.0 (L)               Evaluation history:  TSH   Date Value Ref Range Status   05/20/2020 0.720 0.270 - 4.200 uIU/mL Final     Comment:     TSH results may be falsely decreased if patient taking Biotin.     Free T4   Date Value Ref Range Status   05/20/2020 1.37 0.93 - 1.70 ng/dL Final       Current medications:  Current Outpatient Medications   Medication Sig Dispense Refill   • levothyroxine (SYNTHROID, LEVOTHROID) 175 MCG tablet Take 1 tablet by mouth Daily. 30 tablet 11   • norgestimate-ethinyl estradiol (SPRINTEC 28) 0.25-35 MG-MCG per tablet Take 1 tablet by mouth Daily. 28 tablet 12   • Prenatal Vit-Fe Fumarate-FA (PRENATAL VITAMIN) 27-0.8 MG tablet Take 1 tablet by mouth Daily.       No current facility-administered medications for this visit.        The following portions of the patient's history were reviewed and updated as appropriate:   Past Medical History:   Diagnosis Date   • Abdominal pain     generalized cramping after eating wheat type products   • Acquired hypothyroidism     Secondary to Hashimoto's thyroiditis   • Back pain affecting pregnancy in second trimester    • Dysmenorrhea    • Endogenous obesity    • Excessive or frequent menstruation    • Hashimoto's  thyroiditis    • Headache    • Hypertension 2017    gestational hypertension   • Myopia    • Vitamin D deficiency      Past Surgical History:   Procedure Laterality Date   •  SECTION N/A 2017    Procedure:  SECTION PRIMARY;  Surgeon: Jayne Thibodeaux MD;  Location: [facility] LABOR DELIVERY;  Service:    •  SECTION N/A 2019    Procedure:  SECTION REPEAT;  Surgeon: Vazquez Davis DO;  Location: [facility] LABOR DELIVERY;  Service: Obstetrics   • INJECTION OF MEDICATION  2013    Depo Provera (medroxyprogesterone acetate)   • INJECTION OF MEDICATION  2012    Kenalog x2   • INJECTION OF MEDICATION  2015    Toradol   • INJECTION OF MEDICATION  2015    Zofran   • WISDOM TOOTH EXTRACTION       Family History   Problem Relation Age of Onset   • Asthma Other    • Hypertension Other    • Other Other         Thyroid Disorder, Depressive Disorder, Smoking Tobacco, Anxiety   • Rheum arthritis Mother    • Hypertension Father    • Heart disease Maternal Grandfather    • Hypertension Maternal Grandfather    • Osteoporosis Paternal Grandmother    • Stroke Paternal Grandmother    • Anemia Paternal Grandmother    • Pulmonary embolism Maternal Grandmother      OB History        2    Para   2    Term   2            AB        Living   2       SAB        TAB        Ectopic        Molar        Multiple   0    Live Births   2              Allergies   Allergen Reactions   • Cefzil [Cefprozil] Hives   • Celexa [Citalopram]      Patient states that she is not allergic to this med. It causes fatigue   • Influenza Vaccines      Migraines/ vomiting has reaction 2015- not in past      Social History     Socioeconomic History   • Marital status:      Spouse name: Not on file   • Number of children: Not on file   • Years of education: Not on file   • Highest education level: Not on file   Tobacco Use   • Smoking status: Never Smoker   • Smokeless tobacco:  "Never Used   Substance and Sexual Activity   • Alcohol use: No   • Drug use: No   • Sexual activity: Yes     Partners: Male       Review of Systems  Review of Systems   Constitutional: Negative for activity change, appetite change, diaphoresis and fatigue.   HENT: Negative for facial swelling, sneezing, sore throat, tinnitus, trouble swallowing and voice change.    Eyes: Negative for photophobia, pain, discharge, redness, itching and visual disturbance.   Respiratory: Negative for apnea, cough, choking, chest tightness and shortness of breath.    Cardiovascular: Negative for chest pain, palpitations and leg swelling.   Gastrointestinal: Negative for abdominal distention, abdominal pain, constipation, diarrhea, nausea and vomiting.   Endocrine: Negative for cold intolerance, heat intolerance, polydipsia, polyphagia and polyuria.   Genitourinary: Negative for difficulty urinating, dysuria, frequency, hematuria and urgency.   Musculoskeletal: Negative for arthralgias, back pain, gait problem, joint swelling, myalgias, neck pain and neck stiffness.   Skin: Negative for color change, pallor, rash and wound.   Neurological: Negative for dizziness, tremors, weakness, light-headedness, numbness and headaches.   Hematological: Negative for adenopathy. Does not bruise/bleed easily.   Psychiatric/Behavioral: Negative for behavioral problems, confusion and sleep disturbance.        Objective    /82 (BP Location: Left arm, Patient Position: Sitting)   Pulse 84   Temp 98.5 °F (36.9 °C) (Tympanic)   Ht 165.1 cm (65\")   Wt 118 kg (259 lb 12.8 oz)   LMP 05/20/2020   SpO2 98%   BMI 43.23 kg/m²   Physical Exam   Constitutional: She is oriented to person, place, and time. She appears well-developed and well-nourished. No distress.   HENT:   Head: Normocephalic and atraumatic.   Right Ear: External ear normal.   Left Ear: External ear normal.   Nose: Nose normal.   Eyes: Pupils are equal, round, and reactive to light. " Conjunctivae and EOM are normal.   Neck: Normal range of motion. Neck supple. No tracheal deviation present. No thyromegaly present.   Cardiovascular: Normal rate, regular rhythm and normal heart sounds.   No murmur heard.  Pulmonary/Chest: Effort normal and breath sounds normal. No respiratory distress. She has no wheezes.   Abdominal: Soft. Bowel sounds are normal. There is no tenderness. There is no rebound and no guarding.   Musculoskeletal: Normal range of motion. She exhibits no edema, tenderness or deformity.   Neurological: She is alert and oriented to person, place, and time. No cranial nerve deficit.   Skin: Skin is warm and dry. No rash noted.   Psychiatric: She has a normal mood and affect. Her behavior is normal. Judgment and thought content normal.       Lab Review  Lab Results   Component Value Date    TSH 0.720 05/20/2020     Lab Results   Component Value Date    FREET4 1.37 05/20/2020        Assessment/Plan      1. Hashimoto's thyroiditis    2. Vitamin D deficiency    3. Class 3 severe obesity with body mass index (BMI) of 40.0 to 44.9 in adult, unspecified obesity type, unspecified whether serious comorbidity present (CMS/[facility])    . This diagnosis was discussed and reviewed with the patient including the advantages of drug therapy.     1. Orders placed during this encounter include:  Orders Placed This Encounter   Procedures   • Comprehensive Metabolic Panel   • TSH   • Vitamin B12   • Vitamin D 25 Hydroxy   • CBC & Differential     Order Specific Question:   Manual Differential     Answer:   No       Medications prescribed:  Outpatient Encounter Medications as of 6/9/2020   Medication Sig Dispense Refill   • levothyroxine (SYNTHROID, LEVOTHROID) 175 MCG tablet Take 1 tablet by mouth Daily. 30 tablet 11   • norgestimate-ethinyl estradiol (SPRINTEC 28) 0.25-35 MG-MCG per tablet Take 1 tablet by mouth Daily. 28 tablet 12   • Prenatal Vit-Fe Fumarate-FA (PRENATAL VITAMIN) 27-0.8 MG tablet Take 1 tablet  by mouth Daily.       No facility-administered encounter medications on file as of 6/9/2020.      Hypothyroidism due to hashimoto's           Taking 175 mcg one daily and none on Sunday            Lab Results   Component Value Date    TSH 0.720 05/20/2020          ====     Vit D Def , start 50 th q 2 w        Component      Latest Ref Rng & Units 3/9/2020   25 Hydroxy, Vitamin D      30.0 - 100.0 ng/ml 24.0 (L)              ====     B12 nl       Lab Results   Component Value Date    [id number] 03/09/2020                  Weight Management      Patient's Body mass index is 43.23 kg/m². BMI is above normal parameters. Recommendations include: nutrition counseling.    Decrease caloric intake by 500 calories               4. No follow-ups on file.

## 2020-06-10 ENCOUNTER — OFFICE VISIT (OUTPATIENT)
Dept: OBSTETRICS AND GYNECOLOGY | Facility: CLINIC | Age: 30
End: 2020-06-10

## 2020-06-10 VITALS
DIASTOLIC BLOOD PRESSURE: 100 MMHG | SYSTOLIC BLOOD PRESSURE: 149 MMHG | BODY MASS INDEX: 42.99 KG/M2 | HEIGHT: 65 IN | WEIGHT: 258 LBS

## 2020-06-10 DIAGNOSIS — R45.86 MOOD DISTURBANCE: Primary | ICD-10-CM

## 2020-06-10 DIAGNOSIS — R10.2 PELVIC PAIN: ICD-10-CM

## 2020-06-10 DIAGNOSIS — Z30.8 ENCOUNTER FOR OTHER CONTRACEPTIVE MANAGEMENT: ICD-10-CM

## 2020-06-10 PROBLEM — O34.211 ENCOUNTER FOR MATERNAL CARE FOR LOW TRANSVERSE SCAR FROM PREVIOUS CESAREAN DELIVERY: Status: RESOLVED | Noted: 2019-09-17 | Resolved: 2020-06-10

## 2020-06-10 PROBLEM — O09.299 HISTORY OF PRE-ECLAMPSIA IN PRIOR PREGNANCY, CURRENTLY PREGNANT: Status: RESOLVED | Noted: 2019-09-17 | Resolved: 2020-06-10

## 2020-06-10 PROBLEM — O10.913 MATERNAL CHRONIC HYPERTENSION IN THIRD TRIMESTER: Status: RESOLVED | Noted: 2019-08-06 | Resolved: 2020-06-10

## 2020-06-10 PROCEDURE — 99214 OFFICE O/P EST MOD 30 MIN: CPT | Performed by: OBSTETRICS & GYNECOLOGY

## 2020-06-10 NOTE — PROGRESS NOTES
Jerrica Marquez is a 29 y.o. y/o female.     Chief Complaint: Mood disturbance pelvic pain    HPI:   29 y.o. .  Patient's last menstrual period was 2020..  Patient presents for evaluation secondary to mood disturbances and pelvic pain.  Was seen in the ER recently secondary to severe left-sided pelvic pain.  Had ultrasound which showed multiple small ovarian cysts on both ovaries was told that likely she had a ruptured ovarian cyst that caused her pain.  No large cyst was noted.  Both ovaries seem to be slightly enlarged along with the uterus.  Patient also not happy with current birth control that she is taking she is on Sprintec but feels that it is causing severe mood disturbances states that she is happy and then depressed in all over the place would like to try something new.  Did discuss IUDs or Nexplanon and patient is not interested in either of these would prefer pills recommended trying low Loestrin.     Review of Systems   Constitutional: Negative for chills, fatigue and fever.   HENT: Negative for sore throat.    Eyes: Negative for visual disturbance.   Respiratory: Negative for cough, shortness of breath and wheezing.    Cardiovascular: Negative for chest pain, palpitations and leg swelling.   Gastrointestinal: Negative for abdominal pain, diarrhea, nausea and vomiting.   Genitourinary: Positive for pelvic pain. Negative for dysuria, flank pain, frequency, vaginal bleeding, vaginal discharge and vaginal pain.   Neurological: Negative for syncope, light-headedness and headaches.   Psychiatric/Behavioral: Positive for dysphoric mood. Negative for suicidal ideas. The patient is not nervous/anxious.         The following portions of the patient's history were reviewed and updated as appropriate: allergies, current medications, past family history, past medical history, past social history, past surgical history and problem list.    Allergies   Allergen Reactions   • Cefzil [Cefprozil] Hives    • Celexa [Citalopram]      Patient states that she is not allergic to this med. It causes fatigue   • Influenza Vaccines      Migraines/ vomiting has reaction - not in past         Prior to Admission medications    Medication Sig Start Date End Date Taking? Authorizing Provider   levothyroxine (SYNTHROID, LEVOTHROID) 175 MCG tablet Take 1 tablet by mouth Daily. 19  Yes Beto Aquino APRN   Prenatal Vit-Fe Fumarate-FA (PRENATAL VITAMIN) 27-0.8 MG tablet Take 1 tablet by mouth Daily.   Yes Provider, MD Tess   norgestimate-ethinyl estradiol (SPRINTEC 28) 0.25-35 MG-MCG per tablet Take 1 tablet by mouth Daily. 20   Vazquez Davis, DO        The patient has a family history of   Family History   Problem Relation Age of Onset   • Asthma Other    • Hypertension Other    • Other Other         Thyroid Disorder, Depressive Disorder, Smoking Tobacco, Anxiety   • Rheum arthritis Mother    • Hypertension Father    • Heart disease Maternal Grandfather    • Hypertension Maternal Grandfather    • Osteoporosis Paternal Grandmother    • Stroke Paternal Grandmother    • Anemia Paternal Grandmother    • Pulmonary embolism Maternal Grandmother         Past Medical History:   Diagnosis Date   • Abdominal pain     generalized cramping after eating wheat type products   • Acquired hypothyroidism     Secondary to Hashimoto's thyroiditis   • Back pain affecting pregnancy in second trimester    • Dysmenorrhea    • Endogenous obesity    • Excessive or frequent menstruation    • Hashimoto's thyroiditis    • Headache    • Hypertension 2017    gestational hypertension   • Myopia    • Vitamin D deficiency         OB History        2    Para   2    Term   2            AB        Living   2       SAB        TAB        Ectopic        Molar        Multiple   0    Live Births   2                 Social History     Socioeconomic History   • Marital status:      Spouse name: Not on file  "  • Number of children: Not on file   • Years of education: Not on file   • Highest education level: Not on file   Tobacco Use   • Smoking status: Never Smoker   • Smokeless tobacco: Never Used   Substance and Sexual Activity   • Alcohol use: No   • Drug use: No   • Sexual activity: Yes     Partners: Male        Past Surgical History:   Procedure Laterality Date   •  SECTION N/A 2017    Procedure:  SECTION PRIMARY;  Surgeon: Jayne Thibodeaux MD;  Location: [facility] LABOR DELIVERY;  Service:    •  SECTION N/A 2019    Procedure:  SECTION REPEAT;  Surgeon: Vazquez Davis DO;  Location: [facility] LABOR DELIVERY;  Service: Obstetrics   • INJECTION OF MEDICATION  2013    Depo Provera (medroxyprogesterone acetate)   • INJECTION OF MEDICATION  2012    Kenalog x2   • INJECTION OF MEDICATION  2015    Toradol   • INJECTION OF MEDICATION  2015    Zofran   • WISDOM TOOTH EXTRACTION          Patient Active Problem List   Diagnosis   • Hashimoto's thyroiditis   • Vitamin D deficiency   • Class 3 severe obesity with body mass index (BMI) of 40.0 to 44.9 in adult (CMS/HCC)   • Anemia during pregnancy in second trimester   • Iron deficiency anemia        Documented Vitals    06/10/20 0943   BP: 149/100   Weight: 117 kg (258 lb)   Height: 165.1 cm (65\")        Body mass index is 42.93 kg/m².    Physical Exam   Constitutional: She is oriented to person, place, and time. She appears well-developed and well-nourished. No distress.   HENT:   Head: Normocephalic.   Abdominal: She exhibits no distension.   Musculoskeletal: Normal range of motion. She exhibits no edema, tenderness or deformity.   Neurological: She is alert and oriented to person, place, and time.   Skin: Skin is warm and dry. She is not diaphoretic. No erythema.   Psychiatric: She has a normal mood and affect. Her behavior is normal. Judgment and thought content normal.   Nursing note and vitals " reviewed.      Laboratory Data:   Lab Results - Last 18 Months   Lab Units 05/20/20 1946 03/09/20  1030 12/17/19  1006 02/07/19  0925   GLUCOSE mg/dL 134* 89 116* 104*   BUN mg/dL 11 11 5* 10   CREATININE mg/dL 0.75 0.74 0.45* 0.76   SODIUM mmol/L 138 138 135* 139   POTASSIUM mmol/L 3.6 4.6 4.0 3.8   CHLORIDE mmol/L 100 103 104 104   CO2 mmol/L 24.0 24.9 20.0* 27.0   CALCIUM mg/dL 8.5* 9.2 8.9 9.4   TOTAL PROTEIN g/dL 6.7 7.0 6.6 7.1   ALBUMIN g/dL 3.90 4.20 3.30* 4.10   ALT (SGPT) U/L 23 31 23 22   AST (SGOT) U/L 23 23 17 29   ALK PHOS U/L 68 74 121* 74   BILIRUBIN mg/dL 0.3 0.5 0.3 0.3   EGFR IF NONAFRICN AM mL/min/1.73 92 93 >150 91   GLOBULIN gm/dL 2.8 2.8 3.3 3.0   A/G RATIO g/dL 1.4 1.5 1.0 1.4   BUN / CREAT RATIO  14.7 14.9 11.1 13.2   ANION GAP mmol/L 14.0 10.1 11.0 8.0     Lab Results - Last 18 Months   Lab Units 05/20/20 1946 03/09/20  1030 12/20/19  0719 12/19/19  0523 12/17/19  1006   WBC 10*3/mm3 15.08* 6.79 11.13* 11.07* 10.71   RBC 10*6/mm3 4.34 4.54 3.77 4.38 4.37   HEMOGLOBIN g/dL 12.6 13.3 10.4* 12.0 12.0   HEMATOCRIT % 36.7 37.8 31.1* 35.7 35.7   MCV fL 84.6 83.3 82.5 81.5 81.7   MCH pg 29.0 29.3 27.6 27.4 27.5   MCHC g/dL 34.3 35.2 33.4 33.6 33.6   RDW % 12.3 12.5 18.7* 18.2* 18.2*   RDW-SD fl 37.2 37.6 56.9* 54.3* 54.8*   MPV fL 9.6 10.1 9.5 9.9 9.6   PLATELETS 10*3/mm3 225 291 204 220 219     No results for input(s): HCGQUAL in the last [id number] hours.    Assessment   Patient with abnormal mood secondary to OCP use has recently stopped her OCPs with improvement in mood.  Would like to try different birth control pill plan to start on low Loestrin.  Patient return to see me in 2 months to see how medication is doing return sooner if symptoms are worsening.  Pelvic pain is improving on its own I do not believe there is any follow-up necessary at this time.    No diagnosis found.      Plan         New Medications Ordered This Visit   Medications   • Norethin-Eth Estrad-Fe Biphas 1 MG-10 MCG / 10  MCG tablet     Sig: Take 1 tablet by mouth Daily.     Dispense:  28 tablet     Refill:  12             This document has been electronically signed by Vazquez Davis DO on Ronda 10, 2020 10:02

## 2020-06-11 RX ORDER — NORETHINDRONE ACETATE AND ETHINYL ESTRADIOL 1MG-20(21)
1 KIT ORAL DAILY
Qty: 28 TABLET | Refills: 12 | Status: SHIPPED | OUTPATIENT
Start: 2020-06-11 | End: 2021-06-09

## 2020-07-29 ENCOUNTER — OFFICE VISIT (OUTPATIENT)
Dept: GASTROENTEROLOGY | Facility: CLINIC | Age: 30
End: 2020-07-29

## 2020-07-29 VITALS
OXYGEN SATURATION: 97 % | WEIGHT: 255.4 LBS | SYSTOLIC BLOOD PRESSURE: 140 MMHG | DIASTOLIC BLOOD PRESSURE: 70 MMHG | BODY MASS INDEX: 42.55 KG/M2 | HEART RATE: 94 BPM | HEIGHT: 65 IN

## 2020-07-29 DIAGNOSIS — R79.89 ELEVATED LIVER FUNCTION TESTS: Primary | ICD-10-CM

## 2020-07-29 PROCEDURE — 99214 OFFICE O/P EST MOD 30 MIN: CPT | Performed by: NURSE PRACTITIONER

## 2020-07-29 RX ORDER — PAROXETINE HYDROCHLORIDE 20 MG/1
20 TABLET, FILM COATED ORAL EVERY MORNING
COMMUNITY
End: 2021-06-09 | Stop reason: ALTCHOICE

## 2020-07-29 NOTE — PROGRESS NOTES
Chief Complaint   Patient presents with   • Abnormal Lab       Subjective    Jerrica Marquez is a 30 y.o. female. she is being seen for consultation today at the request of REBECA Hamilton    History of Present Illness  30-year-old female presents to discuss elevated liver enzymes.  She denies any abdominal pain, nausea, vomiting or change within her bowel habits.  Reports she has always felt she had some irritable bowel or she will have some cramping and diarrhea at times but does not impact her daily living.  She is actively trying to lose weight due to high cholesterol and is down 6 pounds from last check with her primary care provider.  Lab work was normal in May when she was seen in the emergency department for urinary tract infection she also had CT which noted normal liver with no focal lesions.  She had routine lab work checked July 13 at primary care provider office which noted normal CBC minimally elevated cholesterol at 211 elevated HDL 43 and , CMP noted normal alkaline phosphatase 79, AST 25, elevated ALT at 38.  Full lab results are scanned in the patient's chart.  She denies any history of alcoholism excessive Tylenol use or any illicit drug use.  She recently had a baby in December 2019.  She does report family history of liver cancer in her grandfather but states he was also an alcoholic.       The following portions of the patient's history were reviewed and updated as appropriate:   Past Medical History:   Diagnosis Date   • Abdominal pain     generalized cramping after eating wheat type products   • Acquired hypothyroidism     Secondary to Hashimoto's thyroiditis   • Back pain affecting pregnancy in second trimester    • Dysmenorrhea    • Endogenous obesity    • Excessive or frequent menstruation    • Hashimoto's thyroiditis    • Headache    • Hypertension 11/01/2017    gestational hypertension   • Myopia    • Vitamin D deficiency      Past Surgical History:   Procedure Laterality  Date   •  SECTION N/A 2017    Procedure:  SECTION PRIMARY;  Surgeon: Jayne Thibodeaux MD;  Location: [facility] LABOR DELIVERY;  Service:    •  SECTION N/A 2019    Procedure:  SECTION REPEAT;  Surgeon: Vazquez Davis DO;  Location: [facility] LABOR DELIVERY;  Service: Obstetrics   • INJECTION OF MEDICATION  2013    Depo Provera (medroxyprogesterone acetate)   • INJECTION OF MEDICATION  2012    Kenalog x2   • INJECTION OF MEDICATION  2015    Toradol   • INJECTION OF MEDICATION  2015    Zofran   • WISDOM TOOTH EXTRACTION       Family History   Problem Relation Age of Onset   • Asthma Other    • Hypertension Other    • Other Other         Thyroid Disorder, Depressive Disorder, Smoking Tobacco, Anxiety   • Rheum arthritis Mother    • Hypertension Father    • Heart disease Maternal Grandfather    • Hypertension Maternal Grandfather    • Osteoporosis Paternal Grandmother    • Stroke Paternal Grandmother    • Anemia Paternal Grandmother    • Pulmonary embolism Maternal Grandmother      OB History        2    Para   2    Term   2            AB        Living   2       SAB        TAB        Ectopic        Molar        Multiple   0    Live Births   2              Prior to Admission medications    Medication Sig Start Date End Date Taking? Authorizing Provider   levothyroxine (SYNTHROID, LEVOTHROID) 175 MCG tablet Take 1 tablet by mouth Daily. 19  Yes Beto Aquino APRN   norethindrone-ethinyl estradiol FE (Junel ) 1-20 MG-MCG per tablet Take 1 tablet by mouth Daily. 20 Yes Vazquez Davis DO   PARoxetine (PAXIL) 20 MG tablet Take 20 mg by mouth Every Morning.   Yes ProviderTess MD   Prenatal Vit-Fe Fumarate-FA (PRENATAL VITAMIN) 27-0.8 MG tablet Take 1 tablet by mouth Daily.   Yes Provider, MD Tess     Allergies   Allergen Reactions   • Cefzil [Cefprozil] Hives   • Celexa [Citalopram]       "Patient states that she is not allergic to this med. It causes fatigue   • Influenza Vaccines      Migraines/ vomiting has reaction 2015- not in past      Social History     Socioeconomic History   • Marital status:      Spouse name: Not on file   • Number of children: Not on file   • Years of education: Not on file   • Highest education level: Not on file   Tobacco Use   • Smoking status: Never Smoker   • Smokeless tobacco: Never Used   Substance and Sexual Activity   • Alcohol use: No   • Drug use: No   • Sexual activity: Yes     Partners: Male       Review of Systems  Review of Systems   Constitutional: Negative for activity change, appetite change, chills, diaphoresis, fatigue, fever and unexpected weight change.   HENT: Negative for sore throat and trouble swallowing.    Respiratory: Negative for shortness of breath.    Gastrointestinal: Positive for abdominal pain (ibs at times ) and diarrhea. Negative for abdominal distention, anal bleeding, blood in stool, constipation, nausea, rectal pain and vomiting.   Musculoskeletal: Negative for arthralgias.   Skin: Negative for pallor.   Neurological: Negative for light-headedness.        /70   Pulse 94   Ht 165.1 cm (65\")   Wt 116 kg (255 lb 6.4 oz)   SpO2 97%   BMI 42.50 kg/m²     Objective    Physical Exam   Constitutional: She is oriented to person, place, and time. She appears well-developed and well-nourished. She is cooperative. No distress.   HENT:   Head: Normocephalic and atraumatic.   Neck: Normal range of motion. Neck supple. No thyromegaly present.   Cardiovascular: Normal rate, regular rhythm and normal heart sounds.   Pulmonary/Chest: Effort normal and breath sounds normal. She has no wheezes. She has no rhonchi. She has no rales.   Abdominal: Soft. Normal appearance and bowel sounds are normal. She exhibits no distension. There is no hepatosplenomegaly. There is no tenderness. There is no rigidity and no guarding. No hernia. "   Lymphadenopathy:     She has no cervical adenopathy.   Neurological: She is alert and oriented to person, place, and time.   Skin: Skin is warm, dry and intact. No rash noted. No pallor.   Psychiatric: She has a normal mood and affect. Her speech is normal.     Admission on 05/20/2020, Discharged on 05/20/2020   Component Date Value Ref Range Status   • Glucose 05/20/2020 134* 65 - 99 mg/dL Final   • BUN 05/20/2020 11  6 - 20 mg/dL Final   • Creatinine 05/20/2020 0.75  0.57 - 1.00 mg/dL Final   • Sodium 05/20/2020 138  136 - 145 mmol/L Final   • Potassium 05/20/2020 3.6  3.5 - 5.2 mmol/L Final   • Chloride 05/20/2020 100  98 - 107 mmol/L Final   • CO2 05/20/2020 24.0  22.0 - 29.0 mmol/L Final   • Calcium 05/20/2020 8.5* 8.6 - 10.5 mg/dL Final   • Total Protein 05/20/2020 6.7  6.0 - 8.5 g/dL Final   • Albumin 05/20/2020 3.90  3.50 - 5.20 g/dL Final   • ALT (SGPT) 05/20/2020 23  1 - 33 U/L Final   • AST (SGOT) 05/20/2020 23  1 - 32 U/L Final   • Alkaline Phosphatase 05/20/2020 68  39 - 117 U/L Final   • Total Bilirubin 05/20/2020 0.3  0.2 - 1.2 mg/dL Final   • eGFR Non African Amer 05/20/2020 92  >60 mL/min/1.73 Final   • Globulin 05/20/2020 2.8  gm/dL Final   • A/G Ratio 05/20/2020 1.4  g/dL Final   • BUN/Creatinine Ratio 05/20/2020 14.7  7.0 - 25.0 Final   • Anion Gap 05/20/2020 14.0  5.0 - 15.0 mmol/L Final   • Lipase 05/20/2020 16  13 - 60 U/L Final   • Color, UA 05/20/2020 Yellow  Yellow, Straw, Dark Yellow, Isabella Final   • Appearance, UA 05/20/2020 Turbid* Clear Final   • pH, UA 05/20/2020 6.0  5.0 - 9.0 Final   • Specific Gravity, UA 05/20/2020 1.026  1.003 - 1.030 Final   • Glucose, UA 05/20/2020 Negative  Negative Final   • Ketones, UA 05/20/2020 Negative  Negative Final   • Bilirubin, UA 05/20/2020 Negative  Negative Final   • Blood, UA 05/20/2020 Large (3+)* Negative Final   • Protein, UA 05/20/2020 Trace* Negative Final   • Leuk Esterase, UA 05/20/2020 Trace* Negative Final   • Nitrite, UA 05/20/2020  Negative  Negative Final   • Urobilinogen, UA 05/20/2020 0.2 E.U./dL  0.2 - 1.0 E.U./dL Final   • HCG, Urine QL 05/20/2020 Negative  Negative Final   • TSH 05/20/2020 0.720  0.270 - 4.200 uIU/mL Final    TSH results may be falsely decreased if patient taking Biotin.   • Free T4 05/20/2020 1.37  0.93 - 1.70 ng/dL Final   • Extra Tube 05/20/2020 hold for add-on   Final    Auto resulted   • Extra Tube 05/20/2020 Hold for add-ons.   Final    Auto resulted.   • Extra Tube 05/20/2020 hold for add-on   Final    Auto resulted   • Extra Tube 05/20/2020 Hold for add-ons.   Final    Auto resulted.   • WBC 05/20/2020 15.08* 3.40 - 10.80 10*3/mm3 Final   • RBC 05/20/2020 4.34  3.77 - 5.28 10*6/mm3 Final   • Hemoglobin 05/20/2020 12.6  12.0 - 15.9 g/dL Final   • Hematocrit 05/20/2020 36.7  34.0 - 46.6 % Final   • MCV 05/20/2020 84.6  79.0 - 97.0 fL Final   • MCH 05/20/2020 29.0  26.6 - 33.0 pg Final   • MCHC 05/20/2020 34.3  31.5 - 35.7 g/dL Final   • RDW 05/20/2020 12.3  12.3 - 15.4 % Final   • RDW-SD 05/20/2020 37.2  37.0 - 54.0 fl Final   • MPV 05/20/2020 9.6  6.0 - 12.0 fL Final   • Platelets 05/20/2020 225  140 - 450 10*3/mm3 Final   • Neutrophil % 05/20/2020 88.8* 42.7 - 76.0 % Final   • Lymphocyte % 05/20/2020 5.8* 19.6 - 45.3 % Final   • Monocyte % 05/20/2020 4.2* 5.0 - 12.0 % Final   • Eosinophil % 05/20/2020 0.2* 0.3 - 6.2 % Final   • Basophil % 05/20/2020 0.3  0.0 - 1.5 % Final   • Immature Grans % 05/20/2020 0.7* 0.0 - 0.5 % Final   • Neutrophils, Absolute 05/20/2020 13.39* 1.70 - 7.00 10*3/mm3 Final   • Lymphocytes, Absolute 05/20/2020 0.88  0.70 - 3.10 10*3/mm3 Final   • Monocytes, Absolute 05/20/2020 0.64  0.10 - 0.90 10*3/mm3 Final   • Eosinophils, Absolute 05/20/2020 0.03  0.00 - 0.40 10*3/mm3 Final   • Basophils, Absolute 05/20/2020 0.04  0.00 - 0.20 10*3/mm3 Final   • Immature Grans, Absolute 05/20/2020 0.10* 0.00 - 0.05 10*3/mm3 Final   • nRBC 05/20/2020 0.0  0.0 - 0.2 /100 WBC Final   • THC, Screen, Urine  05/20/2020 Negative  Negative Final   • Phencyclidine (PCP), Urine 05/20/2020 Negative  Negative Final   • Cocaine Screen, Urine 05/20/2020 Negative  Negative Final   • Methamphetamine, Ur 05/20/2020 Negative  Negative Final   • Opiate Screen 05/20/2020 Negative  Negative Final   • Amphetamine Screen, Urine 05/20/2020 Negative  Negative Final   • Benzodiazepine Screen, Urine 05/20/2020 Negative  Negative Final   • Tricyclic Antidepressants Screen 05/20/2020 Negative  Negative Final   • Methadone Screen, Urine 05/20/2020 Negative  Negative Final   • Barbiturates Screen, Urine 05/20/2020 Negative  Negative Final   • Oxycodone Screen, Urine 05/20/2020 Negative  Negative Final   • Propoxyphene Screen 05/20/2020 Negative  Negative Final   • Buprenorphine, Screen, Urine 05/20/2020 Negative  Negative Final   • RBC, UA 05/20/2020 Too Numerous to Count* None Seen /HPF Final   • WBC, UA 05/20/2020 6-12* None Seen, 0-2, 3-5 /HPF Final   • Bacteria, UA 05/20/2020 Trace* None Seen /HPF Final   • Squamous Epithelial Cells, UA 05/20/2020 0-2  None Seen, 0-2 /HPF Final   • Hyaline Casts, UA 05/20/2020 None Seen  None Seen /LPF Final   • Methodology 05/20/2020 Manual Light Microscopy   Final   • Lactate 05/20/2020 2.1* 0.5 - 2.0 mmol/L Final   • Blood Culture 05/20/2020 No growth at 5 days   Final   • Blood Culture 05/20/2020 No growth at 5 days   Final   • Chlamydia DNA by PCR 05/20/2020 Negative  Negative Final   • Neisseria gonorrhoeae by PCR 05/20/2020 Negative  Negative Final   • Trichomonas vaginalis PCR 05/20/2020 Negative   Final   • Hold Tube 05/20/2020 Hold for add-ons.   Final    Auto resulted.   • Urine Culture 05/20/2020 50,000 CFU/mL Streptococcus agalactiae (Group B)*  Final    This organism is considered to be universally susceptible to penicillin.  No further antibiotic testing will be performed.     Assessment/Plan      1. Elevated liver function tests    .   Will obtain ultrasound of liver and repeat hepatic  function panel.  If persistent elevation will order autoimmune work-up.    Orders placed during this encounter include:  Orders Placed This Encounter   Procedures   • US Liver     Standing Status:   Future     Standing Expiration Date:   7/29/2021     Order Specific Question:   Reason for Exam:     Answer:   elevated liver enzyme     Order Specific Question:   Will this be performed with Elastography? ([facility] LEMUS, and [facility] ONLY)     Answer:   No   • Hepatic Function Panel       * Surgery not found *    Review and/or summary of lab tests, radiology, procedures, medications. Review and summary of old records and obtaining of history. The risks and benefits of my recommendations, as well as other treatment options were discussed with the patient today. Questions were answered.    No orders of the defined types were placed in this encounter.      Follow-up: Return in about 4 weeks (around 8/26/2020).          This document has been electronically signed by REBECA Andrade on July 29, 2020 15:35             Results for orders placed or performed during the hospital encounter of 05/20/20   Lactic Acid, Reflex Timer (This will reflex a repeat order 3-3:15 hours after ordered.)   Result Value Ref Range    Hold Tube Hold for add-ons.    Gold Top - SST   Result Value Ref Range    Extra Tube Hold for add-ons.    Green Top (Gel)   Result Value Ref Range    Extra Tube Hold for add-ons.    Urinalysis, Microscopic Only - Urine, Clean Catch   Result Value Ref Range    RBC, UA Too Numerous to Count (A) None Seen /HPF    WBC, UA 6-12 (A) None Seen, 0-2, 3-5 /HPF    Bacteria, UA Trace (A) None Seen /HPF    Squamous Epithelial Cells, UA 0-2 None Seen, 0-2 /HPF    Hyaline Casts, UA None Seen None Seen /LPF    Methodology Manual Light Microscopy    Urinalysis With Microscopic If Indicated (No Culture) - Urine, Clean Catch   Result Value Ref Range    Color, UA Yellow Yellow, Straw, Dark Yellow, Isabella    Appearance, UA Turbid  (A) Clear    pH, UA 6.0 5.0 - 9.0    Specific [place], UA 1.026 1.003 - 1.030    Glucose, UA Negative Negative    Ketones, UA Negative Negative    Bilirubin, UA Negative Negative    Blood, UA Large (3+) (A) Negative    Protein, UA Trace (A) Negative    Leuk Esterase, UA Trace (A) Negative    Nitrite, UA Negative Negative    Urobilinogen, UA 0.2 E.U./dL 0.2 - 1.0 E.U./dL   Chlamydia trachomatis, Neisseria gonorrhoeae, Trichomonas vaginalis, PCR - Urine, Urine, Clean Catch   Result Value Ref Range    Chlamydia DNA by PCR Negative Negative    Neisseria gonorrhoeae by PCR Negative Negative    Trichomonas vaginalis PCR Negative    CBC Auto Differential   Result Value Ref Range    WBC 15.08 (H) 3.40 - 10.80 10*3/mm3    RBC 4.34 3.77 - 5.28 10*6/mm3    Hemoglobin 12.6 12.0 - 15.9 g/dL    Hematocrit 36.7 34.0 - 46.6 %    MCV 84.6 79.0 - 97.0 fL    MCH 29.0 26.6 - 33.0 pg    MCHC 34.3 31.5 - 35.7 g/dL    RDW 12.3 12.3 - 15.4 %    RDW-SD 37.2 37.0 - 54.0 fl    MPV 9.6 6.0 - 12.0 fL    Platelets 225 140 - 450 10*3/mm3    Neutrophil % 88.8 (H) 42.7 - 76.0 %    Lymphocyte % 5.8 (L) 19.6 - 45.3 %    Monocyte % 4.2 (L) 5.0 - 12.0 %    Eosinophil % 0.2 (L) 0.3 - 6.2 %    Basophil % 0.3 0.0 - 1.5 %    Immature Grans % 0.7 (H) 0.0 - 0.5 %    Neutrophils, Absolute 13.39 (H) 1.70 - 7.00 10*3/mm3    Lymphocytes, Absolute 0.88 0.70 - 3.10 10*3/mm3    Monocytes, Absolute 0.64 0.10 - 0.90 10*3/mm3    Eosinophils, Absolute 0.03 0.00 - 0.40 10*3/mm3    Basophils, Absolute 0.04 0.00 - 0.20 10*3/mm3    Immature Grans, Absolute 0.10 (H) 0.00 - 0.05 10*3/mm3    nRBC 0.0 0.0 - 0.2 /100 WBC   Lavender Top   Result Value Ref Range    Extra Tube hold for add-on    Light Blue Top   Result Value Ref Range    Extra Tube hold for add-on    Urine Drug Screen - Urine, Clean Catch   Result Value Ref Range    THC, Screen, Urine Negative Negative    Phencyclidine (PCP), Urine Negative Negative    Cocaine Screen, Urine Negative Negative    Methamphetamine, Ur  Negative Negative    Opiate Screen Negative Negative    Amphetamine Screen, Urine Negative Negative    Benzodiazepine Screen, Urine Negative Negative    Tricyclic Antidepressants Screen Negative Negative    Methadone Screen, Urine Negative Negative    Barbiturates Screen, Urine Negative Negative    Oxycodone Screen, Urine Negative Negative    Propoxyphene Screen Negative Negative    Buprenorphine, Screen, Urine Negative Negative   Pregnancy, Urine - Urine, Clean Catch   Result Value Ref Range    HCG, Urine QL Negative Negative   Blood Culture - Blood, Hand, Right   Result Value Ref Range    Blood Culture No growth at 5 days    Blood Culture - Blood, Arm, Left   Result Value Ref Range    Blood Culture No growth at 5 days    Urine Culture - Urine, Urine, Clean Catch   Result Value Ref Range    Urine Culture 50,000 CFU/mL Streptococcus agalactiae (Group B) (A)    TSH   Result Value Ref Range    TSH 0.720 0.270 - 4.200 uIU/mL   T4, Free   Result Value Ref Range    Free T4 1.37 0.93 - 1.70 ng/dL   Lipase   Result Value Ref Range    Lipase 16 13 - 60 U/L   Lactic Acid, Plasma   Result Value Ref Range    Lactate 2.1 (C) 0.5 - 2.0 mmol/L   Comprehensive Metabolic Panel   Result Value Ref Range    Glucose 134 (H) 65 - 99 mg/dL    BUN 11 6 - 20 mg/dL    Creatinine 0.75 0.57 - 1.00 mg/dL    Sodium 138 136 - 145 mmol/L    Potassium 3.6 3.5 - 5.2 mmol/L    Chloride 100 98 - 107 mmol/L    CO2 24.0 22.0 - 29.0 mmol/L    Calcium 8.5 (L) 8.6 - 10.5 mg/dL    Total Protein 6.7 6.0 - 8.5 g/dL    Albumin 3.90 3.50 - 5.20 g/dL    ALT (SGPT) 23 1 - 33 U/L    AST (SGOT) 23 1 - 32 U/L    Alkaline Phosphatase 68 39 - 117 U/L    Total Bilirubin 0.3 0.2 - 1.2 mg/dL    eGFR Non African Amer 92 >60 mL/min/1.73    Globulin 2.8 gm/dL    A/G Ratio 1.4 g/dL    BUN/Creatinine Ratio 14.7 7.0 - 25.0    Anion Gap 14.0 5.0 - 15.0 mmol/L   Results for orders placed or performed in visit on 03/09/20   CBC Auto Differential   Result Value Ref Range    WBC  6.79 3.40 - 10.80 10*3/mm3    RBC 4.54 3.77 - 5.28 10*6/mm3    Hemoglobin 13.3 12.0 - 15.9 g/dL    Hematocrit 37.8 34.0 - 46.6 %    MCV 83.3 79.0 - 97.0 fL    MCH 29.3 26.6 - 33.0 pg    MCHC 35.2 31.5 - 35.7 g/dL    RDW 12.5 12.3 - 15.4 %    RDW-SD 37.6 37.0 - 54.0 fl    MPV 10.1 6.0 - 12.0 fL    Platelets 291 140 - 450 10*3/mm3    Neutrophil % 64.1 42.7 - 76.0 %    Lymphocyte % 26.8 19.6 - 45.3 %    Monocyte % 7.1 5.0 - 12.0 %    Eosinophil % 1.2 0.3 - 6.2 %    Basophil % 0.4 0.0 - 1.5 %    Immature Grans % 0.4 0.0 - 0.5 %    Neutrophils, Absolute 4.35 1.70 - 7.00 10*3/mm3    Lymphocytes, Absolute 1.82 0.70 - 3.10 10*3/mm3    Monocytes, Absolute 0.48 0.10 - 0.90 10*3/mm3    Eosinophils, Absolute 0.08 0.00 - 0.40 10*3/mm3    Basophils, Absolute 0.03 0.00 - 0.20 10*3/mm3    Immature Grans, Absolute 0.03 0.00 - 0.05 10*3/mm3    nRBC 0.0 0.0 - 0.2 /100 WBC     *Note: Due to a large number of results and/or encounters for the requested time period, some results have not been displayed. A complete set of results can be found in Results Review.

## 2020-07-29 NOTE — PATIENT INSTRUCTIONS

## 2020-08-05 ENCOUNTER — OFFICE VISIT (OUTPATIENT)
Dept: OBSTETRICS AND GYNECOLOGY | Facility: CLINIC | Age: 30
End: 2020-08-05

## 2020-08-05 VITALS — BODY MASS INDEX: 42.5 KG/M2 | HEIGHT: 65 IN

## 2020-08-05 DIAGNOSIS — Z30.09 ENCOUNTER FOR GENERAL COUNSELING ON PRESCRIPTION OF ORAL CONTRACEPTIVES: Primary | ICD-10-CM

## 2020-08-05 PROCEDURE — 99441 PR PHYS/QHP TELEPHONE EVALUATION 5-10 MIN: CPT | Performed by: OBSTETRICS & GYNECOLOGY

## 2020-08-05 NOTE — PROGRESS NOTES
You have chosen to receive care through a telephone visit. Do you consent to use a telephone visit for your medical care today? Yes    Chief complaint: Birth control follow-up    Patient was seen over telephone visit.  Is following up on birth control.  Was changed to Loestrin at last visit.  Did have some mild mood improvement on Loestrin but not where she wanted to be.  Was seen by primary care who then started her on Paxil since starting Paxil she is tolerating birth control well and mood has improved.  Patient is happy with birth control and wants to continue at this time.    Plan for patient to continue on birth control will refill for the next year.  Patient to return to see me as needed did recommend well woman exam yearly.    This visit has been rescheduled as a phone visit to comply with patient safety concerns in accordance with CDC recommendations. Total time of discussion was 5 minutes.

## 2020-08-10 ENCOUNTER — APPOINTMENT (OUTPATIENT)
Dept: ULTRASOUND IMAGING | Facility: HOSPITAL | Age: 30
End: 2020-08-10

## 2020-08-24 ENCOUNTER — TELEPHONE (OUTPATIENT)
Dept: GENERAL RADIOLOGY | Facility: HOSPITAL | Age: 30
End: 2020-08-24

## 2020-09-09 RX ORDER — LEVOTHYROXINE SODIUM 175 UG/1
TABLET ORAL
Qty: 30 TABLET | Refills: 11 | Status: SHIPPED | OUTPATIENT
Start: 2020-09-09 | End: 2021-10-11

## 2020-10-04 PROCEDURE — U0002 COVID-19 LAB TEST NON-CDC: HCPCS | Performed by: NURSE PRACTITIONER

## 2020-12-09 ENCOUNTER — TELEMEDICINE (OUTPATIENT)
Dept: ENDOCRINOLOGY | Facility: CLINIC | Age: 30
End: 2020-12-09

## 2020-12-09 DIAGNOSIS — E66.9 CLASS 2 OBESITY WITH BODY MASS INDEX (BMI) OF 37.0 TO 37.9 IN ADULT, UNSPECIFIED OBESITY TYPE, UNSPECIFIED WHETHER SERIOUS COMORBIDITY PRESENT: ICD-10-CM

## 2020-12-09 DIAGNOSIS — E06.3 HASHIMOTO'S THYROIDITIS: ICD-10-CM

## 2020-12-09 DIAGNOSIS — E55.9 VITAMIN D DEFICIENCY: Primary | ICD-10-CM

## 2020-12-09 PROCEDURE — 99214 OFFICE O/P EST MOD 30 MIN: CPT | Performed by: NURSE PRACTITIONER

## 2020-12-09 NOTE — PROGRESS NOTES
Subjective    Jerrica Marquez is a 30 y.o. female. she is here today for follow-up.    History of Present Illness     You have chosen to receive care through a telehealth visit.  Do you consent to use a video/audio connection for your medical care today? Yes          TELEHEALTH VIDEO VISIT     This a video visit due to [facility] current guidelines for social distancing due to the COVID 19 pandemic      Primary care provider     REBECA Hamilton       30 year old female presents for follow up     Reason hypothyroidism due to Hashimoto's      Timing constant     Quality controlled         Severity moderate      Symptoms -- mood changes      Quantity     Lab Results   Component Value Date    TSH 0.720 05/20/2020          alleviating -compliance with levothyroxine 175      aggravating factors none                       --  Obesity     Watching diet      --     h o vit D Deficiency, not taking supplements                  Evaluation history:  TSH   Date Value Ref Range Status   05/20/2020 0.720 0.270 - 4.200 uIU/mL Final     Comment:     TSH results may be falsely decreased if patient taking Biotin.     Free T4   Date Value Ref Range Status   05/20/2020 1.37 0.93 - 1.70 ng/dL Final       Current medications:  Current Outpatient Medications   Medication Sig Dispense Refill   • levothyroxine (SYNTHROID, LEVOTHROID) 175 MCG tablet TAKE 1 TABLET BY MOUTH EVERY DAY 30 tablet 11   • Multiple Vitamins-Minerals (MULTIVITAMIN ADULT PO) Take  by mouth.     • norethindrone-ethinyl estradiol FE (Junel FE 1/20) 1-20 MG-MCG per tablet Take 1 tablet by mouth Daily. 28 tablet 12   • PARoxetine (PAXIL) 20 MG tablet Take 20 mg by mouth Every Morning.       No current facility-administered medications for this visit.        The following portions of the patient's history were reviewed and updated as appropriate:   Past Medical History:   Diagnosis Date   • Abdominal pain     generalized cramping after eating wheat type products   •  Acquired hypothyroidism     Secondary to Hashimoto's thyroiditis   • Back pain affecting pregnancy in second trimester    • Dysmenorrhea    • Endogenous obesity    • Excessive or frequent menstruation    • Hashimoto's thyroiditis    • Headache    • Hypertension 2017    gestational hypertension   • Myopia    • Vitamin D deficiency      Past Surgical History:   Procedure Laterality Date   •  SECTION N/A 2017    Procedure:  SECTION PRIMARY;  Surgeon: Jayne Thibodeaux MD;  Location: [facility] LABOR DELIVERY;  Service:    •  SECTION N/A 2019    Procedure:  SECTION REPEAT;  Surgeon: Vazquez Davis DO;  Location: [facility] LABOR DELIVERY;  Service: Obstetrics   • INJECTION OF MEDICATION  2013    Depo Provera (medroxyprogesterone acetate)   • INJECTION OF MEDICATION  2012    Kenalog x2   • INJECTION OF MEDICATION  2015    Toradol   • INJECTION OF MEDICATION  2015    Zofran   • WISDOM TOOTH EXTRACTION       Family History   Problem Relation Age of Onset   • Asthma Other    • Hypertension Other    • Other Other         Thyroid Disorder, Depressive Disorder, Smoking Tobacco, Anxiety   • Rheum arthritis Mother    • Hypertension Father    • Heart disease Maternal Grandfather    • Hypertension Maternal Grandfather    • Osteoporosis Paternal Grandmother    • Stroke Paternal Grandmother    • Anemia Paternal Grandmother    • Pulmonary embolism Maternal Grandmother      OB History        2    Para   2    Term   2            AB        Living   2       SAB        TAB        Ectopic        Molar        Multiple   0    Live Births   2              Allergies   Allergen Reactions   • Cefzil [Cefprozil] Hives   • Celexa [Citalopram]      Patient states that she is not allergic to this med. It causes fatigue   • Influenza Vaccines      Migraines/ vomiting has reaction 2015- not in past      Social History     Socioeconomic History   • Marital status:       Spouse name: Not on file   • Number of children: Not on file   • Years of education: Not on file   • Highest education level: Not on file   Tobacco Use   • Smoking status: Never Smoker   • Smokeless tobacco: Never Used   Substance and Sexual Activity   • Alcohol use: No   • Drug use: No   • Sexual activity: Yes     Partners: Male       Review of Systems  Review of Systems   Constitutional: Negative for activity change, appetite change, diaphoresis and fatigue.   HENT: Negative for facial swelling, sneezing, sore throat, tinnitus, trouble swallowing and voice change.    Eyes: Negative for photophobia, pain, discharge, redness, itching and visual disturbance.   Respiratory: Negative for apnea, cough, choking, chest tightness and shortness of breath.    Cardiovascular: Negative for chest pain, palpitations and leg swelling.   Gastrointestinal: Negative for abdominal distention, abdominal pain, constipation, diarrhea, nausea and vomiting.   Endocrine: Negative for cold intolerance, heat intolerance, polydipsia, polyphagia and polyuria.   Genitourinary: Negative for difficulty urinating, dysuria, frequency, hematuria and urgency.   Musculoskeletal: Negative for arthralgias, back pain, gait problem, joint swelling, myalgias, neck pain and neck stiffness.   Skin: Negative for color change, pallor, rash and wound.   Neurological: Negative for dizziness, tremors, weakness, light-headedness, numbness and headaches.   Hematological: Negative for adenopathy. Does not bruise/bleed easily.   Psychiatric/Behavioral: Negative for behavioral problems, confusion and sleep disturbance.        Objective    There were no vitals taken for this visit.  Physical Exam  Constitutional:       Appearance: Normal appearance.   HENT:      Head: Normocephalic and atraumatic.      Right Ear: External ear normal.      Left Ear: External ear normal.      Nose: Nose normal.   Eyes:      Extraocular Movements: Extraocular movements intact.       Conjunctiva/sclera: Conjunctivae normal.      Pupils: Pupils are equal, round, and reactive to light.   Neck:      Musculoskeletal: Normal range of motion.   Pulmonary:      Effort: Pulmonary effort is normal.   Skin:     Coloration: Skin is not pale.   Neurological:      General: No focal deficit present.      Mental Status: She is alert.   Psychiatric:         Mood and Affect: Mood normal.         Behavior: Behavior normal.         Thought Content: Thought content normal.         Judgment: Judgment normal.         Lab Review  Lab Results   Component Value Date    TSH 0.720 05/20/2020     Lab Results   Component Value Date    FREET4 1.37 05/20/2020        Assessment/Plan      1. Vitamin D deficiency    2. Hashimoto's thyroiditis    3. Class 2 obesity with body mass index (BMI) of 37.0 to 37.9 in adult, unspecified obesity type, unspecified whether serious comorbidity present    . This diagnosis was discussed and reviewed with the patient including the advantages of drug therapy.     1. Orders placed during this encounter include:  Orders Placed This Encounter   Procedures   • Comprehensive Metabolic Panel     Standing Status:   Future     Standing Expiration Date:   12/9/2021   • TSH     Standing Status:   Future     Standing Expiration Date:   12/9/2021   • Vitamin D 25 Hydroxy     Standing Status:   Future     Standing Expiration Date:   12/9/2021   • Vitamin B12     Standing Status:   Future     Standing Expiration Date:   12/9/2021   • CBC & Differential     Standing Status:   Future     Standing Expiration Date:   12/9/2021     Order Specific Question:   Manual Differential     Answer:   No       Medications prescribed:  Outpatient Encounter Medications as of 12/9/2020   Medication Sig Dispense Refill   • levothyroxine (SYNTHROID, LEVOTHROID) 175 MCG tablet TAKE 1 TABLET BY MOUTH EVERY DAY 30 tablet 11   • Multiple Vitamins-Minerals (MULTIVITAMIN ADULT PO) Take  by mouth.     • norethindrone-ethinyl  estradiol FE (Junel FE 1/20) 1-20 MG-MCG per tablet Take 1 tablet by mouth Daily. 28 tablet 12   • PARoxetine (PAXIL) 20 MG tablet Take 20 mg by mouth Every Morning.       No facility-administered encounter medications on file as of 12/9/2020.      Hypothyroidism due to hashimoto's           Taking 175 mcg one daily and none on Sunday         Lab Results   Component Value Date    TSH 0.720 05/20/2020       ====     Vitamin d def.      Not taking supplement    Restart              Component      Latest Ref Rng & Units 3/9/2020   25 Hydroxy, Vitamin D      30.0 - 100.0 ng/ml 24.0 (L)           ====     B12 nl               Lab Results   Component Value Date     [id number] 644 03/09/2020                     Weight Management     Obesity     Try to decrease daily caloric intake     Patient's Body mass index is 43.23 kg/m². BMI is above normal parameters. Recommendations include: nutrition counseling.                   4. Return in about 6 months (around 6/9/2021).      We spent 25 minutes on the video call    I reviewed the patients electronic chart, reviewed medications, reviewed past and current labs, and active problems      I provided advice regarding thyroid disease management, refilled medications today, order future labs and made future appointment     Patient was advised to contact the office if there were any unanswered questions or any concerns

## 2020-12-10 ENCOUNTER — LAB (OUTPATIENT)
Dept: LAB | Facility: HOSPITAL | Age: 30
End: 2020-12-10

## 2020-12-10 DIAGNOSIS — E55.9 VITAMIN D DEFICIENCY: ICD-10-CM

## 2020-12-10 DIAGNOSIS — E06.3 HASHIMOTO'S THYROIDITIS: ICD-10-CM

## 2020-12-10 LAB
25(OH)D3 SERPL-MCNC: 23.2 NG/ML (ref 30–100)
ALBUMIN SERPL-MCNC: 4.2 G/DL (ref 3.5–5.2)
ALBUMIN/GLOB SERPL: 1.3 G/DL
ALP SERPL-CCNC: 81 U/L (ref 39–117)
ALT SERPL W P-5'-P-CCNC: 23 U/L (ref 1–33)
ANION GAP SERPL CALCULATED.3IONS-SCNC: 10.8 MMOL/L (ref 5–15)
AST SERPL-CCNC: 20 U/L (ref 1–32)
BASOPHILS # BLD AUTO: 0.04 10*3/MM3 (ref 0–0.2)
BASOPHILS NFR BLD AUTO: 0.6 % (ref 0–1.5)
BILIRUB SERPL-MCNC: 0.3 MG/DL (ref 0–1.2)
BUN SERPL-MCNC: 9 MG/DL (ref 6–20)
BUN/CREAT SERPL: 10.5 (ref 7–25)
CALCIUM SPEC-SCNC: 9.4 MG/DL (ref 8.6–10.5)
CHLORIDE SERPL-SCNC: 103 MMOL/L (ref 98–107)
CO2 SERPL-SCNC: 25.2 MMOL/L (ref 22–29)
CREAT SERPL-MCNC: 0.86 MG/DL (ref 0.57–1)
DEPRECATED RDW RBC AUTO: 37.6 FL (ref 37–54)
EOSINOPHIL # BLD AUTO: 0.11 10*3/MM3 (ref 0–0.4)
EOSINOPHIL NFR BLD AUTO: 1.7 % (ref 0.3–6.2)
ERYTHROCYTE [DISTWIDTH] IN BLOOD BY AUTOMATED COUNT: 12.5 % (ref 12.3–15.4)
GFR SERPL CREATININE-BSD FRML MDRD: 78 ML/MIN/1.73
GLOBULIN UR ELPH-MCNC: 3.2 GM/DL
GLUCOSE SERPL-MCNC: 87 MG/DL (ref 65–99)
HCT VFR BLD AUTO: 39 % (ref 34–46.6)
HGB BLD-MCNC: 13.4 G/DL (ref 12–15.9)
IMM GRANULOCYTES # BLD AUTO: 0.03 10*3/MM3 (ref 0–0.05)
IMM GRANULOCYTES NFR BLD AUTO: 0.5 % (ref 0–0.5)
LYMPHOCYTES # BLD AUTO: 1.82 10*3/MM3 (ref 0.7–3.1)
LYMPHOCYTES NFR BLD AUTO: 28.8 % (ref 19.6–45.3)
MCH RBC QN AUTO: 28.6 PG (ref 26.6–33)
MCHC RBC AUTO-ENTMCNC: 34.4 G/DL (ref 31.5–35.7)
MCV RBC AUTO: 83.3 FL (ref 79–97)
MONOCYTES # BLD AUTO: 0.31 10*3/MM3 (ref 0.1–0.9)
MONOCYTES NFR BLD AUTO: 4.9 % (ref 5–12)
NEUTROPHILS NFR BLD AUTO: 4 10*3/MM3 (ref 1.7–7)
NEUTROPHILS NFR BLD AUTO: 63.5 % (ref 42.7–76)
NRBC BLD AUTO-RTO: 0 /100 WBC (ref 0–0.2)
PLATELET # BLD AUTO: 295 10*3/MM3 (ref 140–450)
PMV BLD AUTO: 10.4 FL (ref 6–12)
POTASSIUM SERPL-SCNC: 4.2 MMOL/L (ref 3.5–5.2)
PROT SERPL-MCNC: 7.4 G/DL (ref 6–8.5)
RBC # BLD AUTO: 4.68 10*6/MM3 (ref 3.77–5.28)
SODIUM SERPL-SCNC: 139 MMOL/L (ref 136–145)
TSH SERPL DL<=0.05 MIU/L-ACNC: 1.37 UIU/ML (ref 0.27–4.2)
VIT B12 BLD-MCNC: 545 PG/ML (ref 211–946)
WBC # BLD AUTO: 6.31 10*3/MM3 (ref 3.4–10.8)

## 2020-12-10 PROCEDURE — 36415 COLL VENOUS BLD VENIPUNCTURE: CPT

## 2020-12-10 PROCEDURE — 82607 VITAMIN B-12: CPT

## 2020-12-10 PROCEDURE — 85025 COMPLETE CBC W/AUTO DIFF WBC: CPT

## 2020-12-10 PROCEDURE — 82306 VITAMIN D 25 HYDROXY: CPT

## 2020-12-10 PROCEDURE — 80053 COMPREHEN METABOLIC PANEL: CPT

## 2020-12-10 PROCEDURE — 84443 ASSAY THYROID STIM HORMONE: CPT

## 2021-02-22 ENCOUNTER — TELEPHONE (OUTPATIENT)
Dept: ENDOCRINOLOGY | Facility: CLINIC | Age: 31
End: 2021-02-22

## 2021-06-07 ENCOUNTER — LAB (OUTPATIENT)
Dept: LAB | Facility: HOSPITAL | Age: 31
End: 2021-06-07

## 2021-06-07 LAB
25(OH)D3 SERPL-MCNC: 22 NG/ML
ALBUMIN SERPL-MCNC: 4 G/DL (ref 3.5–5.2)
ALBUMIN/GLOB SERPL: 1.3 G/DL
ALP SERPL-CCNC: 72 U/L (ref 39–117)
ALT SERPL W P-5'-P-CCNC: 20 U/L (ref 1–33)
ANION GAP SERPL CALCULATED.3IONS-SCNC: 8.2 MMOL/L (ref 5–15)
AST SERPL-CCNC: 17 U/L (ref 1–32)
BASOPHILS # BLD AUTO: 0.03 10*3/MM3 (ref 0–0.2)
BASOPHILS NFR BLD AUTO: 0.4 % (ref 0–1.5)
BILIRUB SERPL-MCNC: 0.3 MG/DL (ref 0–1.2)
BUN SERPL-MCNC: 9 MG/DL (ref 6–20)
BUN/CREAT SERPL: 12.5 (ref 7–25)
CALCIUM SPEC-SCNC: 8.8 MG/DL (ref 8.6–10.5)
CHLORIDE SERPL-SCNC: 106 MMOL/L (ref 98–107)
CO2 SERPL-SCNC: 24.8 MMOL/L (ref 22–29)
CREAT SERPL-MCNC: 0.72 MG/DL (ref 0.57–1)
DEPRECATED RDW RBC AUTO: 38.1 FL (ref 37–54)
EOSINOPHIL # BLD AUTO: 0.11 10*3/MM3 (ref 0–0.4)
EOSINOPHIL NFR BLD AUTO: 1.6 % (ref 0.3–6.2)
ERYTHROCYTE [DISTWIDTH] IN BLOOD BY AUTOMATED COUNT: 12.8 % (ref 12.3–15.4)
GFR SERPL CREATININE-BSD FRML MDRD: 96 ML/MIN/1.73
GLOBULIN UR ELPH-MCNC: 3.1 GM/DL
GLUCOSE SERPL-MCNC: 81 MG/DL (ref 65–99)
HCT VFR BLD AUTO: 38.8 % (ref 34–46.6)
HGB BLD-MCNC: 13.3 G/DL (ref 12–15.9)
IMM GRANULOCYTES # BLD AUTO: 0.02 10*3/MM3 (ref 0–0.05)
IMM GRANULOCYTES NFR BLD AUTO: 0.3 % (ref 0–0.5)
LYMPHOCYTES # BLD AUTO: 1.64 10*3/MM3 (ref 0.7–3.1)
LYMPHOCYTES NFR BLD AUTO: 24.6 % (ref 19.6–45.3)
MCH RBC QN AUTO: 28.7 PG (ref 26.6–33)
MCHC RBC AUTO-ENTMCNC: 34.3 G/DL (ref 31.5–35.7)
MCV RBC AUTO: 83.6 FL (ref 79–97)
MONOCYTES # BLD AUTO: 0.56 10*3/MM3 (ref 0.1–0.9)
MONOCYTES NFR BLD AUTO: 8.4 % (ref 5–12)
NEUTROPHILS NFR BLD AUTO: 4.31 10*3/MM3 (ref 1.7–7)
NEUTROPHILS NFR BLD AUTO: 64.7 % (ref 42.7–76)
NRBC BLD AUTO-RTO: 0 /100 WBC (ref 0–0.2)
PLATELET # BLD AUTO: 296 10*3/MM3 (ref 140–450)
PMV BLD AUTO: 10.1 FL (ref 6–12)
POTASSIUM SERPL-SCNC: 3.9 MMOL/L (ref 3.5–5.2)
PROT SERPL-MCNC: 7.1 G/DL (ref 6–8.5)
RBC # BLD AUTO: 4.64 10*6/MM3 (ref 3.77–5.28)
SODIUM SERPL-SCNC: 139 MMOL/L (ref 136–145)
TSH SERPL DL<=0.05 MIU/L-ACNC: 0.58 UIU/ML (ref 0.27–4.2)
VIT B12 BLD-MCNC: 452 PG/ML (ref 211–946)
WBC # BLD AUTO: 6.67 10*3/MM3 (ref 3.4–10.8)

## 2021-06-07 PROCEDURE — 82607 VITAMIN B-12: CPT | Performed by: NURSE PRACTITIONER

## 2021-06-07 PROCEDURE — 85025 COMPLETE CBC W/AUTO DIFF WBC: CPT | Performed by: NURSE PRACTITIONER

## 2021-06-07 PROCEDURE — 82306 VITAMIN D 25 HYDROXY: CPT | Performed by: NURSE PRACTITIONER

## 2021-06-07 PROCEDURE — 80053 COMPREHEN METABOLIC PANEL: CPT | Performed by: NURSE PRACTITIONER

## 2021-06-07 PROCEDURE — 36415 COLL VENOUS BLD VENIPUNCTURE: CPT | Performed by: NURSE PRACTITIONER

## 2021-06-07 PROCEDURE — 84443 ASSAY THYROID STIM HORMONE: CPT | Performed by: NURSE PRACTITIONER

## 2021-06-09 ENCOUNTER — OFFICE VISIT (OUTPATIENT)
Dept: ENDOCRINOLOGY | Facility: CLINIC | Age: 31
End: 2021-06-09

## 2021-06-09 VITALS
HEART RATE: 82 BPM | OXYGEN SATURATION: 98 % | HEIGHT: 65 IN | WEIGHT: 255.5 LBS | BODY MASS INDEX: 42.57 KG/M2 | SYSTOLIC BLOOD PRESSURE: 138 MMHG | DIASTOLIC BLOOD PRESSURE: 80 MMHG

## 2021-06-09 DIAGNOSIS — E06.3 HASHIMOTO'S THYROIDITIS: Primary | ICD-10-CM

## 2021-06-09 DIAGNOSIS — E66.9 CLASS 2 OBESITY WITH BODY MASS INDEX (BMI) OF 37.0 TO 37.9 IN ADULT, UNSPECIFIED OBESITY TYPE, UNSPECIFIED WHETHER SERIOUS COMORBIDITY PRESENT: ICD-10-CM

## 2021-06-09 DIAGNOSIS — E55.9 VITAMIN D DEFICIENCY: ICD-10-CM

## 2021-06-09 PROCEDURE — 99214 OFFICE O/P EST MOD 30 MIN: CPT | Performed by: NURSE PRACTITIONER

## 2021-06-09 RX ORDER — FLUOXETINE HYDROCHLORIDE 20 MG/1
20 CAPSULE ORAL DAILY
COMMUNITY
End: 2022-06-02

## 2021-06-09 NOTE — PROGRESS NOTES
"Chief Complaint  Hashimoto's Thyroiditis    Subjective          Jerrica Marquez presents to [facility] ENDOCRINOLOGY  History of Present Illness     In office visit    Primary care provider      REBECA Hamilton     30 year old female presents for follow up     Reason hypothyroidism due to Hashimoto's     Duration over 10 years    Timing constant     Quality controlled         Severity moderate          Quantity      Lab Results   Component Value Date    TSH 0.579 06/07/2021          alleviating -compliance with levothyroxine 175      aggravating factors none                        --  Obesity      Watching diet     She has lost 20 lbs      --     h o vit D Deficiency, not taking supplements            Review of Systems - General ROS: negative        Objective   Vital Signs:   /80   Pulse 82   Ht 165.1 cm (65\")   Wt 116 kg (255 lb 8 oz)   SpO2 98%   BMI 42.52 kg/m²     Physical Exam  Constitutional:       Appearance: Normal appearance.   Cardiovascular:      Rate and Rhythm: Regular rhythm.      Heart sounds: Normal heart sounds.   Pulmonary:      Breath sounds: Normal breath sounds.   Musculoskeletal:         General: Normal range of motion.      Cervical back: Normal range of motion.   Skin:     Coloration: Skin is not pale.   Neurological:      General: No focal deficit present.      Mental Status: She is alert.   Psychiatric:         Mood and Affect: Mood normal.         Thought Content: Thought content normal.         Judgment: Judgment normal.        Result Review :   The following data was reviewed by: REBECA Blancas on 06/09/2021:  Common labs    Common Labsle 12/10/20 12/10/20 6/7/21 6/7/21    1133 1133 1218 1218   Glucose  87  81   BUN  9  9   Creatinine  0.86  0.72   eGFR Non African Am  78  96   Sodium  139  139   Potassium  4.2  3.9   Chloride  103  106   Calcium  9.4  8.8   Albumin  4.20  4.00   Total Bilirubin  0.3  0.3   Alkaline Phosphatase  81  72 "   AST (SGOT)  20  17   ALT (SGPT)  23  20   WBC 6.31  6.67    Hemoglobin 13.4  13.3    Hematocrit 39.0  38.8    Platelets 295  296                      Assessment and Plan    Diagnoses and all orders for this visit:    1. Hashimoto's thyroiditis (Primary)  -     CBC & Differential; Future  -     Comprehensive Metabolic Panel; Future  -     TSH; Future  -     Vitamin D 25 Hydroxy; Future    2. Vitamin D deficiency  -     CBC & Differential; Future  -     Comprehensive Metabolic Panel; Future  -     TSH; Future  -     Vitamin D 25 Hydroxy; Future    3. Class 2 obesity with body mass index (BMI) of 37.0 to 37.9 in adult, unspecified obesity type, unspecified whether serious comorbidity present               Hypothyroidism due to hashimoto's           Taking 175 mcg one daily         Lab Results   Component Value Date    TSH 0.579 06/07/2021       ====     Vitamin d def.        Taking MVI daily            Component      Latest Ref Rng & Units 6/7/2021   25 Hydroxy, Vitamin D      ng/ml 22.0          ====     B12 nl      Lab Results   Component Value Date    [id number] 452 06/07/2021                   Weight Management      Obesity      Try to decrease daily caloric intake     Patient's Body mass index is 42.52 kg/m². indicating that she is morbidly obese (BMI > 40 or > 35 with obesity - related health condition). Obesity-related health conditions include the following: none. Obesity is unchanged. BMI is is above average; no BMI management plan is appropriate. We discussed portion control and increasing exercise..        she has lost 20 lbs                Follow Up   Return in about 6 months (around 12/9/2021) for Recheck.  Patient was given instructions and counseling regarding her condition or for health maintenance advice. Please see specific information pulled into the AVS if appropriate.

## 2021-07-26 ENCOUNTER — HOSPITAL ENCOUNTER (OUTPATIENT)
Dept: CT IMAGING | Facility: HOSPITAL | Age: 31
Discharge: HOME | End: 2021-07-26
Admitting: NURSE PRACTITIONER

## 2021-07-26 DIAGNOSIS — H60.62 CHRONIC INFECTION OF LEFT EXTERNAL EAR: ICD-10-CM

## 2021-07-26 PROCEDURE — 70450 CT HEAD/BRAIN W/O DYE: CPT

## 2021-08-03 ENCOUNTER — OFFICE VISIT (OUTPATIENT)
Dept: OTOLARYNGOLOGY | Facility: CLINIC | Age: 31
End: 2021-08-03

## 2021-08-03 VITALS — HEART RATE: 88 BPM | HEIGHT: 65 IN | OXYGEN SATURATION: 98 % | BODY MASS INDEX: 42.25 KG/M2 | WEIGHT: 253.6 LBS

## 2021-08-03 DIAGNOSIS — H61.22 IMPACTED CERUMEN OF LEFT EAR: Primary | ICD-10-CM

## 2021-08-03 DIAGNOSIS — H60.8X2 CHRONIC REACTIVE OTITIS EXTERNA OF LEFT EAR: ICD-10-CM

## 2021-08-03 PROCEDURE — 99203 OFFICE O/P NEW LOW 30 MIN: CPT | Performed by: OTOLARYNGOLOGY

## 2021-08-03 PROCEDURE — 69210 REMOVE IMPACTED EAR WAX UNI: CPT | Performed by: OTOLARYNGOLOGY

## 2021-08-03 RX ORDER — HYDROCORTISONE AND ACETIC ACID 20.75; 10.375 MG/ML; MG/ML
4 SOLUTION AURICULAR (OTIC) 2 TIMES WEEKLY
Qty: 10 ML | Refills: 11 | Status: SHIPPED | OUTPATIENT
Start: 2021-08-05

## 2021-08-03 NOTE — PROGRESS NOTES
Subjective   Jerrica Marquez is a 31 y.o. female.   ear problem  History of Present Illness   Patient states that sometime in late April early May she had an ear infection was treated got better and then recurred and has been bothersome since then she is not having any pain right now but has intense itching particular ear canal and also the scalp behind her ear she says she has some eczema on her scalp.  Not any fever chills slight distortion of hearing but no major change in hearing no vertigo or major tinnitus she is not had a lot of ear problems in the past so she said her brother has him.  He is not currently having drainage and she feels better than she did but still bothersome to her particular the itching  CT of the head was done by her primary care provider  Patient admits to using Q-tips in her ear  The following portions of the patient's history were reviewed and updated as appropriate: allergies, current medications, past family history, past medical history, past social history, past surgical history and problem list.      Jerrica Marquez reports that she has never smoked. She has never used smokeless tobacco. She reports that she does not drink alcohol and does not use drugs.  Patient is not a tobacco user and has not been counseled for use of tobacco products    Family History   Problem Relation Age of Onset   • Asthma Other    • Hypertension Other    • Other Other         Thyroid Disorder, Depressive Disorder, Smoking Tobacco, Anxiety   • Rheum arthritis Mother    • Hypertension Father    • Heart disease Maternal Grandfather    • Hypertension Maternal Grandfather    • Osteoporosis Paternal Grandmother    • Stroke Paternal Grandmother    • Anemia Paternal Grandmother    • Pulmonary embolism Maternal Grandmother          Current Outpatient Medications:   •  [START ON 8/5/2021] acetic acid-hydrocortisone (VOSOL-HC) 1-2 % otic solution, Administer 4 drops into ear(s) as directed by provider 2  (Two) Times a Week., Disp: 10 mL, Rfl: 11  •  Cholecalciferol (VITAMIN D3 PO), 1,000 Units 2 (Two) Times a Week., Disp: , Rfl:   •  FLUoxetine (PROzac) 20 MG capsule, Take 20 mg by mouth Daily., Disp: , Rfl:   •  levothyroxine (SYNTHROID, LEVOTHROID) 175 MCG tablet, TAKE 1 TABLET BY MOUTH EVERY DAY, Disp: 30 tablet, Rfl: 11  •  Multiple Vitamins-Minerals (MULTIVITAMIN ADULT PO), Take  by mouth., Disp: , Rfl:   •  promethazine (PHENERGAN) 25 MG tablet, Take 1 tablet by mouth Every 6 (Six) Hours As Needed for Nausea or Vomiting., Disp: 12 tablet, Rfl: 0    Allergies   Allergen Reactions   • Cefzil [Cefprozil] Hives   • Celexa [Citalopram]      Patient states that she is not allergic to this med. It causes fatigue   • Eggs Or Egg-Derived Products Diarrhea and Nausea And Vomiting     Pt says she is NOT allergic to eggs/dislikes them.   • Influenza Vaccines      Migraines/ vomiting has reaction 2015- not in past        Past Medical History:   Diagnosis Date   • Abdominal pain     generalized cramping after eating wheat type products   • Acquired hypothyroidism     Secondary to Hashimoto's thyroiditis   • Back pain affecting pregnancy in second trimester    • Dysmenorrhea    • Endogenous obesity    • Excessive or frequent menstruation    • Hashimoto's thyroiditis    • Headache    • Hypertension 2017    gestational hypertension   • Myopia    • Vitamin D deficiency        Past Surgical History:   Procedure Laterality Date   •  SECTION N/A 2017    Procedure:  SECTION PRIMARY;  Surgeon: Jayne Thibodeaux MD;  Location: [facility] LABOR DELIVERY;  Service:    •  SECTION N/A 2019    Procedure:  SECTION REPEAT;  Surgeon: Vazquez Davis DO;  Location: [facility] LABOR DELIVERY;  Service: Obstetrics   • INJECTION OF MEDICATION  2013    Depo Provera (medroxyprogesterone acetate)   • INJECTION OF MEDICATION  2012    Kenalog x2   • INJECTION OF MEDICATION  2015     Toradol   • INJECTION OF MEDICATION  09/17/2015    Zofran   • WISDOM TOOTH EXTRACTION         Review of Systems   Constitutional: Negative for fever.   HENT: Positive for hearing loss. Negative for ear discharge, ear pain, facial swelling and sinus pain.    Skin:        Is itching and eczema-like or dandruff like she says her scalp   Neurological: Negative for facial asymmetry.   Hematological: Negative for adenopathy.           Objective   Physical Exam  Vitals and nursing note reviewed.   HENT:      Head: Normocephalic.      Right Ear: Tympanic membrane and external ear normal.      Left Ear: Tympanic membrane and external ear normal.      Ears:      Comments: Left cerumen impaction     Nose: Nose normal.      Mouth/Throat:      Mouth: Mucous membranes are moist.      Comments: No erythema or inflammation or unusual drainage  Pulmonary:      Effort: Pulmonary effort is normal.   Lymphadenopathy:      Cervical: No cervical adenopathy.   Skin:     General: Skin is warm.      Comments: No head neck rash seen   Neurological:      Mental Status: She is alert.     No mastoid tenderness or rash noted      CT of the head was normal    Procedure note left ear was cleaned of large amount of cerumen and there is evidence of cotton fibers left ear canals negative the mildly inflamed      Assessment/Plan   Diagnoses and all orders for this visit:    1. Impacted cerumen of left ear (Primary)    2. Chronic reactive otitis externa of left ear    Other orders  -     acetic acid-hydrocortisone (VOSOL-HC) 1-2 % otic solution; Administer 4 drops into ear(s) as directed by provider 2 (Two) Times a Week.  Dispense: 10 mL; Refill: 11      Keep ear dry no Q-tips call problems or questions   Call office if problems getting prescription with pharmacy  There is seeing dermatologist because of skin condition of scalp  Audiogram with follow-up of any concern about hearing    Follow-up 2 to 3 weeks

## 2021-08-04 ENCOUNTER — PATIENT ROUNDING (BHMG ONLY) (OUTPATIENT)
Dept: SURGERY | Facility: CLINIC | Age: 31
End: 2021-08-04

## 2021-08-04 NOTE — PROGRESS NOTES
August 4, 2021    Hello, may I speak with Jerrica Marquez?    My name is AFSHAN BarkerN, RN, Practice Manager for      [facility] Otolaryngology (ENT)  [address] [place] [address] [address] [place] [address] [phone].    Before we get started may I verify your date of birth? 1990 verified    I am calling to officially welcome you to our practice and ask about your recent visit. Is this a good time to talk? Yes    Tell me about your visit with us. What things went well? Everything went well. It was very smooth. I did not have to wait long and was in and out very quickly. I have no complaints what so ever.       We're always looking for ways to make our patients' experiences even better. Do you have recommendations on ways we may improve? No, you all have it down to a science.    Overall were you satisfied with your first visit to our practice?Yes       I appreciate you taking the time to speak with me today. Is there anything else I can do for you? No  I would be happy to fill out a [employer] survey and share how good my experience was.      Thank you, and have a great day.

## 2021-08-26 PROBLEM — F41.8 MIXED ANXIETY AND DEPRESSIVE DISORDER: Status: ACTIVE | Noted: 2021-08-26

## 2021-08-26 PROBLEM — N94.3 PREMENSTRUAL SYNDROME: Status: ACTIVE | Noted: 2021-08-26

## 2021-08-26 PROBLEM — H60.92 OTITIS EXTERNA OF LEFT EAR: Status: ACTIVE | Noted: 2021-08-26

## 2021-08-26 PROCEDURE — 87081 CULTURE SCREEN ONLY: CPT | Performed by: NURSE PRACTITIONER

## 2021-08-26 PROCEDURE — 87635 SARS-COV-2 COVID-19 AMP PRB: CPT | Performed by: NURSE PRACTITIONER

## 2021-09-14 ENCOUNTER — OFFICE VISIT (OUTPATIENT)
Dept: OTOLARYNGOLOGY | Facility: CLINIC | Age: 31
End: 2021-09-14

## 2021-09-14 VITALS — HEIGHT: 65 IN | BODY MASS INDEX: 42.69 KG/M2 | WEIGHT: 256.2 LBS | OXYGEN SATURATION: 98 % | HEART RATE: 86 BPM

## 2021-09-14 DIAGNOSIS — H60.8X2 CHRONIC REACTIVE OTITIS EXTERNA OF LEFT EAR: Primary | ICD-10-CM

## 2021-09-14 PROCEDURE — 99213 OFFICE O/P EST LOW 20 MIN: CPT | Performed by: OTOLARYNGOLOGY

## 2021-09-14 NOTE — PROGRESS NOTES
Subjective   Jerrica Marquez is a 31 y.o. female.       History of Present Illness   Patient saw Dr. Ervin and was having substantial itching of the left ear.  He cleaned her ear with cerumen impaction and felt like she had otitis externa and placed her on VoSoL.  She says the itching is gone for the first time in 3 years.  She feels much improved.      The following portions of the patient's history were reviewed and updated as appropriate: allergies, current medications, past family history, past medical history, past social history, past surgical history and problem list.     reports that she has never smoked. She has never used smokeless tobacco. She reports that she does not drink alcohol and does not use drugs.   Patient is not a tobacco user and has not been counseled for use of tobacco products      Review of Systems        Objective   Physical Exam  Right ear no discharge.  Tympanic membrane intact and clear.  Left ear shows a good bit of squamous debris that is uninfected along with drop residue.  This is all cleaned under the microscope using instrumentation.  Beyond this tympanic membrane is intact no infection or effusion.    Assessment/Plan   Diagnoses and all orders for this visit:    1. Chronic reactive otitis externa of left ear (Primary)        Plan: Ear cleaned as described above.  May discontinue the VoSol.  Advise no Q-tip manipulation.  Suggested she return in 4 months to see if she has reaccumulation of squamous debris.

## 2021-10-11 RX ORDER — LEVOTHYROXINE SODIUM 175 UG/1
TABLET ORAL
Qty: 90 TABLET | Refills: 3 | Status: SHIPPED | OUTPATIENT
Start: 2021-10-11 | End: 2021-12-30 | Stop reason: SDUPTHER

## 2021-12-30 ENCOUNTER — TELEPHONE (OUTPATIENT)
Dept: ENDOCRINOLOGY | Facility: CLINIC | Age: 31
End: 2021-12-30

## 2021-12-30 ENCOUNTER — OFFICE VISIT (OUTPATIENT)
Dept: ENDOCRINOLOGY | Facility: CLINIC | Age: 31
End: 2021-12-30

## 2021-12-30 DIAGNOSIS — E66.9 OBESITY, UNSPECIFIED CLASSIFICATION, UNSPECIFIED OBESITY TYPE, UNSPECIFIED WHETHER SERIOUS COMORBIDITY PRESENT: ICD-10-CM

## 2021-12-30 DIAGNOSIS — E55.9 VITAMIN D DEFICIENCY: ICD-10-CM

## 2021-12-30 DIAGNOSIS — E06.3 HASHIMOTO'S THYROIDITIS: Primary | ICD-10-CM

## 2021-12-30 PROCEDURE — 99214 OFFICE O/P EST MOD 30 MIN: CPT | Performed by: NURSE PRACTITIONER

## 2021-12-30 RX ORDER — LEVOTHYROXINE SODIUM 175 UG/1
175 TABLET ORAL DAILY
Qty: 90 TABLET | Refills: 3 | Status: SHIPPED | OUTPATIENT
Start: 2021-12-30 | End: 2022-06-02 | Stop reason: SDUPTHER

## 2021-12-30 NOTE — PROGRESS NOTES
Chief Complaint  No chief complaint on file.    Subjective          Jerrica Marquez presents to [facility] ENDOCRINOLOGY  History of Present Illness       You have chosen to receive care through a telehealth visit.  Do you consent to use a video/audio connection for your medical care today? Yes          TELEHEALTH VIDEO VISIT     This a video visit due to [facility] current guidelines for social distancing due to the COVID 19 pandemic        Primary care provider      REBECA Navas     31 year old female presents for follow up     Reason hypothyroidism due to Hashimoto's     Duration diagnosed in 2010     Timing constant     Quality controlled     Severity moderate        Lab Results   Component Value Date    TSH 0.579 06/07/2021                    alleviating -compliance with levothyroxine 175      aggravating factors none                        --  Obesity      Watching diet      She has lost 20 lbs      --     h o vit D Deficiency, not taking supplements          Review of Systems - General ROS: negative               Objective   Vital Signs:   There were no vitals taken for this visit.    Physical Exam  Neurological:      General: No focal deficit present.      Mental Status: She is alert.   Psychiatric:         Mood and Affect: Mood normal.         Thought Content: Thought content normal.         Judgment: Judgment normal.        Result Review :   The following data was reviewed by: REBECA Blancas on 12/30/2021:  Common labs    Common Labsle 6/7/21 6/7/21    1218 1218   Glucose  81   BUN  9   Creatinine  0.72   eGFR Non African Am  96   Sodium  139   Potassium  3.9   Chloride  106   Calcium  8.8   Albumin  4.00   Total Bilirubin  0.3   Alkaline Phosphatase  72   AST (SGOT)  17   ALT (SGPT)  20   WBC 6.67    Hemoglobin 13.3    Hematocrit 38.8    Platelets 296                      Assessment and Plan    Diagnoses and all orders for this visit:    1. Hashimoto's  thyroiditis (Primary)  -     CBC & Differential  -     Comprehensive Metabolic Panel  -     TSH  -     Vitamin D 25 Hydroxy    2. Vitamin D deficiency  -     CBC & Differential  -     Comprehensive Metabolic Panel  -     TSH  -     Vitamin D 25 Hydroxy    3. Obesity, unspecified classification, unspecified obesity type, unspecified whether serious comorbidity present    Other orders  -     levothyroxine (SYNTHROID, LEVOTHROID) 175 MCG tablet; Take 1 tablet by mouth Daily.  Dispense: 90 tablet; Refill: 3             Hypothyroidism due to hashimoto's           Taking 175 mcg one daily         Lab Results   Component Value Date    TSH 0.579 06/07/2021          Vitamin d def.        Taking MVI daily            Component      Latest Ref Rng & Units 6/7/2021   25 Hydroxy, Vitamin D      ng/ml 22.0            ====     B12 nl        Lab Results   Component Value Date    [id number] 452 06/07/2021                   Weight Management      Obesity      Try to decrease daily caloric intake             she has lost 20 lbs                         Follow Up   Return in about 3 months (around 3/30/2022) for Recheck.  Patient was given instructions and counseling regarding her condition or for health maintenance advice. Please see specific information pulled into the AVS if appropriate.         This document has been electronically signed by REBECA Blancas on December 30, 2021 14:54 CST.

## 2022-05-26 ENCOUNTER — LAB (OUTPATIENT)
Dept: LAB | Facility: HOSPITAL | Age: 32
End: 2022-05-26

## 2022-05-26 DIAGNOSIS — E55.9 VITAMIN D DEFICIENCY: ICD-10-CM

## 2022-05-26 DIAGNOSIS — E06.3 HASHIMOTO'S THYROIDITIS: ICD-10-CM

## 2022-05-26 LAB
ALBUMIN SERPL-MCNC: 4 G/DL (ref 3.5–5.2)
ALBUMIN/GLOB SERPL: 1.3 G/DL
ALP SERPL-CCNC: 73 U/L (ref 39–117)
ALT SERPL W P-5'-P-CCNC: 17 U/L (ref 1–33)
ANION GAP SERPL CALCULATED.3IONS-SCNC: 10 MMOL/L (ref 5–15)
AST SERPL-CCNC: 20 U/L (ref 1–32)
BASOPHILS # BLD AUTO: 0.04 10*3/MM3 (ref 0–0.2)
BASOPHILS NFR BLD AUTO: 0.5 % (ref 0–1.5)
BILIRUB SERPL-MCNC: 0.2 MG/DL (ref 0–1.2)
BUN SERPL-MCNC: 11 MG/DL (ref 6–20)
BUN/CREAT SERPL: 12 (ref 7–25)
CALCIUM SPEC-SCNC: 9.1 MG/DL (ref 8.6–10.5)
CHLORIDE SERPL-SCNC: 104 MMOL/L (ref 98–107)
CO2 SERPL-SCNC: 24 MMOL/L (ref 22–29)
CREAT SERPL-MCNC: 0.92 MG/DL (ref 0.57–1)
DEPRECATED RDW RBC AUTO: 39 FL (ref 37–54)
EGFRCR SERPLBLD CKD-EPI 2021: 86.1 ML/MIN/1.73
EOSINOPHIL # BLD AUTO: 0.14 10*3/MM3 (ref 0–0.4)
EOSINOPHIL NFR BLD AUTO: 1.9 % (ref 0.3–6.2)
ERYTHROCYTE [DISTWIDTH] IN BLOOD BY AUTOMATED COUNT: 13 % (ref 12.3–15.4)
GLOBULIN UR ELPH-MCNC: 3 GM/DL
GLUCOSE SERPL-MCNC: 79 MG/DL (ref 65–99)
HCT VFR BLD AUTO: 35.2 % (ref 34–46.6)
HGB BLD-MCNC: 11.9 G/DL (ref 12–15.9)
IMM GRANULOCYTES # BLD AUTO: 0.03 10*3/MM3 (ref 0–0.05)
IMM GRANULOCYTES NFR BLD AUTO: 0.4 % (ref 0–0.5)
LYMPHOCYTES # BLD AUTO: 2.02 10*3/MM3 (ref 0.7–3.1)
LYMPHOCYTES NFR BLD AUTO: 27.4 % (ref 19.6–45.3)
MCH RBC QN AUTO: 28.1 PG (ref 26.6–33)
MCHC RBC AUTO-ENTMCNC: 33.8 G/DL (ref 31.5–35.7)
MCV RBC AUTO: 83 FL (ref 79–97)
MONOCYTES # BLD AUTO: 0.51 10*3/MM3 (ref 0.1–0.9)
MONOCYTES NFR BLD AUTO: 6.9 % (ref 5–12)
NEUTROPHILS NFR BLD AUTO: 4.63 10*3/MM3 (ref 1.7–7)
NEUTROPHILS NFR BLD AUTO: 62.9 % (ref 42.7–76)
NRBC BLD AUTO-RTO: 0 /100 WBC (ref 0–0.2)
PLATELET # BLD AUTO: 277 10*3/MM3 (ref 140–450)
PMV BLD AUTO: 9.8 FL (ref 6–12)
POTASSIUM SERPL-SCNC: 3.8 MMOL/L (ref 3.5–5.2)
PROT SERPL-MCNC: 7 G/DL (ref 6–8.5)
RBC # BLD AUTO: 4.24 10*6/MM3 (ref 3.77–5.28)
SODIUM SERPL-SCNC: 138 MMOL/L (ref 136–145)
WBC NRBC COR # BLD: 7.37 10*3/MM3 (ref 3.4–10.8)

## 2022-05-26 PROCEDURE — 84443 ASSAY THYROID STIM HORMONE: CPT

## 2022-05-26 PROCEDURE — 85025 COMPLETE CBC W/AUTO DIFF WBC: CPT | Performed by: NURSE PRACTITIONER

## 2022-05-26 PROCEDURE — 82306 VITAMIN D 25 HYDROXY: CPT

## 2022-05-26 PROCEDURE — 36415 COLL VENOUS BLD VENIPUNCTURE: CPT | Performed by: NURSE PRACTITIONER

## 2022-05-26 PROCEDURE — 80053 COMPREHEN METABOLIC PANEL: CPT | Performed by: NURSE PRACTITIONER

## 2022-05-27 LAB
25(OH)D3 SERPL-MCNC: 22 NG/ML (ref 30–100)
TSH SERPL DL<=0.05 MIU/L-ACNC: 1.53 UIU/ML (ref 0.27–4.2)

## 2022-06-02 ENCOUNTER — TELEMEDICINE (OUTPATIENT)
Dept: ENDOCRINOLOGY | Facility: CLINIC | Age: 32
End: 2022-06-02

## 2022-06-02 DIAGNOSIS — E55.9 VITAMIN D DEFICIENCY: ICD-10-CM

## 2022-06-02 DIAGNOSIS — D50.8 OTHER IRON DEFICIENCY ANEMIA: ICD-10-CM

## 2022-06-02 DIAGNOSIS — E06.3 HASHIMOTO'S THYROIDITIS: Primary | ICD-10-CM

## 2022-06-02 PROCEDURE — 99214 OFFICE O/P EST MOD 30 MIN: CPT | Performed by: NURSE PRACTITIONER

## 2022-06-02 RX ORDER — CITALOPRAM 20 MG/1
20 TABLET ORAL DAILY
COMMUNITY

## 2022-06-02 RX ORDER — LEVOTHYROXINE SODIUM 175 UG/1
175 TABLET ORAL DAILY
Qty: 30 TABLET | Refills: 11 | Status: SHIPPED | OUTPATIENT
Start: 2022-06-02 | End: 2022-08-08 | Stop reason: SDUPTHER

## 2022-06-02 NOTE — PROGRESS NOTES
Chief Complaint  Thyroid Problem    Subjective          Jerrica Marquez presents to [facility] ENDOCRINOLOGY  History of Present Illness     You have chosen to receive care through a telehealth visit.  Do you consent to use a video/audio connection for your medical care today? Yes          TELEHEALTH VIDEO VISIT     This a video visit due to [facility] current guidelines for social distancing due to the COVID 19 pandemic      Primary care provider      REBECA Navas      31 year old female presents for follow up      Reason hypothyroidism due to Hashimoto's      Duration diagnosed in 2010      Timing constant      Quality controlled      Severity moderate      alleviating -compliance with levothyroxine 175              vit D Deficiency, not taking supplements      Review of Systems - General ROS: negative                     Objective   Vital Signs:   There were no vitals taken for this visit.    Physical Exam  Neurological:      General: No focal deficit present.      Mental Status: She is alert.   Psychiatric:         Mood and Affect: Mood normal.         Thought Content: Thought content normal.         Judgment: Judgment normal.        Result Review :   The following data was reviewed by: REBECA Blancas on 06/02/2022:  Common labs    Common Labsle 5/26/22 5/26/22    1324 1324   Glucose  79   BUN  11   Creatinine  0.92   Sodium  138   Potassium  3.8   Chloride  104   Calcium  9.1   Albumin  4.00   Total Bilirubin  0.2   Alkaline Phosphatase  73   AST (SGOT)  20   ALT (SGPT)  17   WBC 7.37    Hemoglobin 11.9 (A)    Hematocrit 35.2    Platelets 277    (A) Abnormal value                      Assessment and Plan    Diagnoses and all orders for this visit:    1. Hashimoto's thyroiditis (Primary)  -     CBC & Differential; Future  -     Comprehensive Metabolic Panel; Future  -     TSH; Future  -     Vitamin D 25 Hydroxy; Future  -     Iron Profile; Future    2. Vitamin D  deficiency  -     CBC & Differential; Future  -     Comprehensive Metabolic Panel; Future  -     TSH; Future  -     Vitamin D 25 Hydroxy; Future  -     Iron Profile; Future    3. Other iron deficiency anemia  -     CBC & Differential; Future  -     Comprehensive Metabolic Panel; Future  -     TSH; Future  -     Vitamin D 25 Hydroxy; Future  -     Iron Profile; Future    Other orders  -     levothyroxine (SYNTHROID, LEVOTHROID) 175 MCG tablet; Take 1 tablet by mouth Daily.  Dispense: 30 tablet; Refill: 11           Hypothyroidism due to hashimoto's           Taking 175 mcg one daily --keep        Lab Results   Component Value Date    TSH 1.530 05/26/2022          Vitamin d def.        Taking MVI daily           Component      Latest Ref Rng & Units 5/26/2022   25 Hydroxy, Vitamin D      30.0 - 100.0 ng/ml 22.0 (L)        ====     B12 nl         Lab Results   Component Value Date    [id number] 452 06/07/2021            Iron def. Anemia    Start iron daily                         Follow Up   No follow-ups on file.  Patient was given instructions and counseling regarding her condition or for health maintenance advice. Please see specific information pulled into the AVS if appropriate.         This document has been electronically signed by REBECA Blancas on June 2, 2022 10:16 CDT.

## 2022-08-08 ENCOUNTER — TELEPHONE (OUTPATIENT)
Dept: ENDOCRINOLOGY | Facility: CLINIC | Age: 32
End: 2022-08-08

## 2022-08-08 RX ORDER — LEVOTHYROXINE SODIUM 175 UG/1
175 TABLET ORAL DAILY
Qty: 30 TABLET | Refills: 1 | Status: SHIPPED | OUTPATIENT
Start: 2022-08-08 | End: 2023-01-23 | Stop reason: SDUPTHER

## 2022-08-08 NOTE — TELEPHONE ENCOUNTER
PT called and is needing Rx: levothyroxine (SYNTHROID, LEVOTHROID) 175 MCG tablet     Switching to Pharmacy:  [facility]/pharmacy #[id number] - [place], [place] - [address] [place] [address] - [phone]  - [phone] [address] [address]   Phone:  [phone]  Fax:  [phone]

## 2022-09-20 ENCOUNTER — LAB (OUTPATIENT)
Dept: LAB | Facility: HOSPITAL | Age: 32
End: 2022-09-20

## 2022-09-20 ENCOUNTER — OFFICE VISIT (OUTPATIENT)
Dept: ENDOCRINOLOGY | Facility: CLINIC | Age: 32
End: 2022-09-20

## 2022-09-20 VITALS
BODY MASS INDEX: 41.65 KG/M2 | SYSTOLIC BLOOD PRESSURE: 140 MMHG | WEIGHT: 250 LBS | DIASTOLIC BLOOD PRESSURE: 80 MMHG | HEART RATE: 78 BPM | HEIGHT: 65 IN | OXYGEN SATURATION: 99 %

## 2022-09-20 DIAGNOSIS — E06.3 HASHIMOTO'S THYROIDITIS: ICD-10-CM

## 2022-09-20 DIAGNOSIS — D50.8 OTHER IRON DEFICIENCY ANEMIA: ICD-10-CM

## 2022-09-20 DIAGNOSIS — E66.01 CLASS 3 SEVERE OBESITY WITH BODY MASS INDEX (BMI) OF 40.0 TO 44.9 IN ADULT, UNSPECIFIED OBESITY TYPE, UNSPECIFIED WHETHER SERIOUS COMORBIDITY PRESENT: ICD-10-CM

## 2022-09-20 DIAGNOSIS — E55.9 VITAMIN D DEFICIENCY: ICD-10-CM

## 2022-09-20 DIAGNOSIS — E06.3 HASHIMOTO'S THYROIDITIS: Primary | ICD-10-CM

## 2022-09-20 LAB
ALBUMIN SERPL-MCNC: 4.2 G/DL (ref 3.5–5.2)
ALBUMIN/GLOB SERPL: 1.5 G/DL
ALP SERPL-CCNC: 69 U/L (ref 39–117)
ALT SERPL W P-5'-P-CCNC: 22 U/L (ref 1–33)
ANION GAP SERPL CALCULATED.3IONS-SCNC: 9 MMOL/L (ref 5–15)
AST SERPL-CCNC: 25 U/L (ref 1–32)
BASOPHILS # BLD AUTO: 0.04 10*3/MM3 (ref 0–0.2)
BASOPHILS NFR BLD AUTO: 0.6 % (ref 0–1.5)
BILIRUB SERPL-MCNC: 0.3 MG/DL (ref 0–1.2)
BUN SERPL-MCNC: 9 MG/DL (ref 6–20)
BUN/CREAT SERPL: 11.4 (ref 7–25)
CALCIUM SPEC-SCNC: 9.1 MG/DL (ref 8.6–10.5)
CHLORIDE SERPL-SCNC: 103 MMOL/L (ref 98–107)
CO2 SERPL-SCNC: 28 MMOL/L (ref 22–29)
CREAT SERPL-MCNC: 0.79 MG/DL (ref 0.57–1)
DEPRECATED RDW RBC AUTO: 40.7 FL (ref 37–54)
EGFRCR SERPLBLD CKD-EPI 2021: 102.7 ML/MIN/1.73
EOSINOPHIL # BLD AUTO: 0.1 10*3/MM3 (ref 0–0.4)
EOSINOPHIL NFR BLD AUTO: 1.5 % (ref 0.3–6.2)
ERYTHROCYTE [DISTWIDTH] IN BLOOD BY AUTOMATED COUNT: 13.7 % (ref 12.3–15.4)
GLOBULIN UR ELPH-MCNC: 2.8 GM/DL
GLUCOSE SERPL-MCNC: 82 MG/DL (ref 65–99)
HCT VFR BLD AUTO: 35.7 % (ref 34–46.6)
HGB BLD-MCNC: 11.5 G/DL (ref 12–15.9)
IMM GRANULOCYTES # BLD AUTO: 0.02 10*3/MM3 (ref 0–0.05)
IMM GRANULOCYTES NFR BLD AUTO: 0.3 % (ref 0–0.5)
LYMPHOCYTES # BLD AUTO: 1.52 10*3/MM3 (ref 0.7–3.1)
LYMPHOCYTES NFR BLD AUTO: 22.8 % (ref 19.6–45.3)
MCH RBC QN AUTO: 26.4 PG (ref 26.6–33)
MCHC RBC AUTO-ENTMCNC: 32.2 G/DL (ref 31.5–35.7)
MCV RBC AUTO: 82.1 FL (ref 79–97)
MONOCYTES # BLD AUTO: 0.52 10*3/MM3 (ref 0.1–0.9)
MONOCYTES NFR BLD AUTO: 7.8 % (ref 5–12)
NEUTROPHILS NFR BLD AUTO: 4.46 10*3/MM3 (ref 1.7–7)
NEUTROPHILS NFR BLD AUTO: 67 % (ref 42.7–76)
NRBC BLD AUTO-RTO: 0 /100 WBC (ref 0–0.2)
PLATELET # BLD AUTO: 350 10*3/MM3 (ref 140–450)
PMV BLD AUTO: 9.6 FL (ref 6–12)
POTASSIUM SERPL-SCNC: 4 MMOL/L (ref 3.5–5.2)
PROT SERPL-MCNC: 7 G/DL (ref 6–8.5)
RBC # BLD AUTO: 4.35 10*6/MM3 (ref 3.77–5.28)
SODIUM SERPL-SCNC: 140 MMOL/L (ref 136–145)
WBC NRBC COR # BLD: 6.66 10*3/MM3 (ref 3.4–10.8)

## 2022-09-20 PROCEDURE — 84466 ASSAY OF TRANSFERRIN: CPT

## 2022-09-20 PROCEDURE — 99214 OFFICE O/P EST MOD 30 MIN: CPT | Performed by: NURSE PRACTITIONER

## 2022-09-20 PROCEDURE — 80050 GENERAL HEALTH PANEL: CPT

## 2022-09-20 PROCEDURE — 83540 ASSAY OF IRON: CPT

## 2022-09-20 PROCEDURE — 82306 VITAMIN D 25 HYDROXY: CPT

## 2022-09-20 PROCEDURE — 36415 COLL VENOUS BLD VENIPUNCTURE: CPT

## 2022-09-20 RX ORDER — ASPIRIN 81 MG/1
81 TABLET ORAL DAILY
COMMUNITY

## 2022-09-20 NOTE — PROGRESS NOTES
"Chief Complaint  Hashimoto's Thyroiditis    Subjective          Jerrica Marquez presents to [facility] ENDOCRINOLOGY  History of Present Illness     In office visit       Primary care provider      REBECA Navas      32 year old female presents for follow up      Reason hypothyroidism due to Hashimoto's      Duration diagnosed in 2010      Timing constant      Quality controlled      Severity moderate      alleviating -compliance with levothyroxine 175              vit D Deficiency, not taking supplements                   Review of Systems - General ROS: negative            Objective   Vital Signs:   /80   Pulse 78   Ht 165.1 cm (65\")   Wt 113 kg (250 lb)   SpO2 99%   BMI 41.60 kg/m²     Physical Exam  Constitutional:       Appearance: Normal appearance.   Cardiovascular:      Rate and Rhythm: Regular rhythm.      Heart sounds: Normal heart sounds.   Musculoskeletal:      Cervical back: Normal range of motion.   Neurological:      General: No focal deficit present.      Mental Status: She is alert.   Psychiatric:         Mood and Affect: Mood normal.         Thought Content: Thought content normal.         Judgment: Judgment normal.        Result Review :   The following data was reviewed by: REBECA Blancas on 06/02/2022:  Common labs    Common Labs 5/26/22 5/26/22    1324 1324   Glucose  79   BUN  11   Creatinine  0.92   Sodium  138   Potassium  3.8   Chloride  104   Calcium  9.1   Albumin  4.00   Total Bilirubin  0.2   Alkaline Phosphatase  73   AST (SGOT)  20   ALT (SGPT)  17   WBC 7.37    Hemoglobin 11.9 (A)    Hematocrit 35.2    Platelets 277    (A) Abnormal value                        Assessment and Plan    Diagnoses and all orders for this visit:    1. Hashimoto's thyroiditis (Primary)  -     CBC & Differential  -     Comprehensive Metabolic Panel  -     TSH    2. Vitamin D deficiency  -     Vitamin D 25 Hydroxy    3. Other iron deficiency " anemia  -     Iron Profile    4. Class 3 severe obesity with body mass index (BMI) of 40.0 to 44.9 in adult, unspecified obesity type, unspecified whether serious comorbidity present (HCC)           Hypothyroidism due to hashimoto's           Levothyroxine Taking 175 mcg one daily --keep        Lab Results   Component Value Date    TSH 1.530 05/26/2022          Vitamin d def.        Taking MVI daily           Component      Latest Ref Rng & Units 5/26/2022   25 Hydroxy, Vitamin D      30.0 - 100.0 ng/ml 22.0 (L)        ====     B12 nl         Lab Results   Component Value Date    [id number] 452 06/07/2021            Iron def. Anemia    Taking  iron daily             weight management     Body mass index is 41.6 kg/m².      She has lost 10 lbs             Follow Up   No follow-ups on file.  Patient was given instructions and counseling regarding her condition or for health maintenance advice. Please see specific information pulled into the AVS if appropriate.         This document has been electronically signed by REBECA Blancas on September 20, 2022 09:41 CDT.

## 2022-09-21 LAB
25(OH)D3 SERPL-MCNC: 28.7 NG/ML (ref 30–100)
IRON 24H UR-MRATE: 53 MCG/DL (ref 37–145)
IRON SATN MFR SERPL: 12 % (ref 20–50)
TIBC SERPL-MCNC: 443 MCG/DL (ref 298–536)
TRANSFERRIN SERPL-MCNC: 297 MG/DL (ref 200–360)
TSH SERPL DL<=0.05 MIU/L-ACNC: 0.03 UIU/ML (ref 0.27–4.2)

## 2023-01-16 ENCOUNTER — TELEMEDICINE (OUTPATIENT)
Dept: ENDOCRINOLOGY | Facility: CLINIC | Age: 33
End: 2023-01-16
Payer: MEDICAID

## 2023-01-16 DIAGNOSIS — E06.3 HASHIMOTO'S THYROIDITIS: Primary | ICD-10-CM

## 2023-01-16 DIAGNOSIS — D50.8 OTHER IRON DEFICIENCY ANEMIA: ICD-10-CM

## 2023-01-16 DIAGNOSIS — E55.9 VITAMIN D DEFICIENCY: ICD-10-CM

## 2023-01-16 PROCEDURE — 99214 OFFICE O/P EST MOD 30 MIN: CPT | Performed by: NURSE PRACTITIONER

## 2023-01-16 NOTE — PROGRESS NOTES
Chief Complaint  No chief complaint on file.    Subjective          Jerrica Marquez presents to [facility] ENDOCRINOLOGY  Hashimoto's Thyroiditis       You have chosen to receive care through a telehealth visit.  Do you consent to use a video/audio connection for your medical care today? Yes          TELEHEALTH VIDEO VISIT     This a video visit due to [facility] current guidelines for social distancing due to the COVID 19 pandemic      Mode of Visit: Video  Location of patient: home  You have chosen to receive care through a telehealth visit.  Does the patient consent to use a video/audio connection for your medical care today? Yes  The visit included audio and video interaction. No technical issues occurred during this visit.     Primary care provider      REBECA Navas      32 year old female presents for follow up      Reason hypothyroidism due to Hashimoto's      Duration diagnosed in 2010      Timing constant      Quality controlled      Severity moderate      alleviating -compliance with levothyroxine 175              vit D Deficiency, not taking supplements      She has lost 35 lbs since last year      Review of Systems - General ROS: negative          Objective   Vital Signs:   There were no vitals taken for this visit.    Physical Exam  Constitutional:       Appearance: Normal appearance.   Cardiovascular:      Rate and Rhythm: Regular rhythm.      Heart sounds: Normal heart sounds.   Musculoskeletal:      Cervical back: Normal range of motion.   Neurological:      General: No focal deficit present.      Mental Status: She is alert.   Psychiatric:         Mood and Affect: Mood normal.         Thought Content: Thought content normal.         Judgment: Judgment normal.        Result Review :   The following data was reviewed by: REBECA Blancas on 06/02/2022:  Common labs    Common Labs 5/26/22 5/26/22 9/20/22 9/20/22    1324 1324 1000 1000   Glucose  79  82   BUN   11  9   Creatinine  0.92  0.79   Sodium  138  140   Potassium  3.8  4.0   Chloride  104  103   Calcium  9.1  9.1   Albumin  4.00  4.20   Total Bilirubin  0.2  0.3   Alkaline Phosphatase  73  69   AST (SGOT)  20  25   ALT (SGPT)  17  22   WBC 7.37  6.66    Hemoglobin 11.9 (A)  11.5 (A)    Hematocrit 35.2  35.7    Platelets 277  350    (A) Abnormal value                        Assessment and Plan    Diagnoses and all orders for this visit:    1. Hashimoto's thyroiditis (Primary)  -     CBC & Differential  -     Comprehensive Metabolic Panel  -     TSH  -     Vitamin D,25-Hydroxy  -     Vitamin B12    2. Vitamin D deficiency  -     CBC & Differential  -     Comprehensive Metabolic Panel  -     TSH  -     Vitamin D,25-Hydroxy  -     Vitamin B12    3. Other iron deficiency anemia  -     Iron Profile           Hypothyroidism due to hashimoto's           Levothyroxine Taking 175 mcg one daily --keep        Lab Results   Component Value Date    TSH 0.029 (L) 09/20/2022          Vitamin d def.        Taking MVI daily           Component      Latest Ref Rng & Units 5/26/2022   25 Hydroxy, Vitamin D      30.0 - 100.0 ng/ml 22.0 (L)        ====     B12 nl         Lab Results   Component Value Date    [id number] 452 06/07/2021            Iron def. Anemia    Taking  iron daily             weight management           She has lost 35 lbs             Follow Up   No follow-ups on file.  Patient was given instructions and counseling regarding her condition or for health maintenance advice. Please see specific information pulled into the AVS if appropriate.         This document has been electronically signed by REBECA Blancas on January 16, 2023 10:52 CST.

## 2023-01-18 ENCOUNTER — LAB (OUTPATIENT)
Dept: LAB | Facility: HOSPITAL | Age: 33
End: 2023-01-18
Payer: MEDICAID

## 2023-01-18 LAB
ALBUMIN SERPL-MCNC: 4 G/DL (ref 3.5–5.2)
ALBUMIN/GLOB SERPL: 1.4 G/DL
ALP SERPL-CCNC: 62 U/L (ref 39–117)
ALT SERPL W P-5'-P-CCNC: 15 U/L (ref 1–33)
ANION GAP SERPL CALCULATED.3IONS-SCNC: 9 MMOL/L (ref 5–15)
AST SERPL-CCNC: 19 U/L (ref 1–32)
BASOPHILS # BLD AUTO: 0.03 10*3/MM3 (ref 0–0.2)
BASOPHILS NFR BLD AUTO: 0.5 % (ref 0–1.5)
BILIRUB SERPL-MCNC: 0.3 MG/DL (ref 0–1.2)
BUN SERPL-MCNC: 9 MG/DL (ref 6–20)
BUN/CREAT SERPL: 12.3 (ref 7–25)
CALCIUM SPEC-SCNC: 8.9 MG/DL (ref 8.6–10.5)
CHLORIDE SERPL-SCNC: 102 MMOL/L (ref 98–107)
CO2 SERPL-SCNC: 26 MMOL/L (ref 22–29)
CREAT SERPL-MCNC: 0.73 MG/DL (ref 0.57–1)
DEPRECATED RDW RBC AUTO: 44 FL (ref 37–54)
EGFRCR SERPLBLD CKD-EPI 2021: 112.9 ML/MIN/1.73
EOSINOPHIL # BLD AUTO: 0.11 10*3/MM3 (ref 0–0.4)
EOSINOPHIL NFR BLD AUTO: 1.8 % (ref 0.3–6.2)
ERYTHROCYTE [DISTWIDTH] IN BLOOD BY AUTOMATED COUNT: 14.6 % (ref 12.3–15.4)
GLOBULIN UR ELPH-MCNC: 2.8 GM/DL
GLUCOSE SERPL-MCNC: 126 MG/DL (ref 65–99)
HCT VFR BLD AUTO: 35.6 % (ref 34–46.6)
HGB BLD-MCNC: 11.7 G/DL (ref 12–15.9)
IMM GRANULOCYTES # BLD AUTO: 0.02 10*3/MM3 (ref 0–0.05)
IMM GRANULOCYTES NFR BLD AUTO: 0.3 % (ref 0–0.5)
LYMPHOCYTES # BLD AUTO: 1.5 10*3/MM3 (ref 0.7–3.1)
LYMPHOCYTES NFR BLD AUTO: 24.9 % (ref 19.6–45.3)
MCH RBC QN AUTO: 27.2 PG (ref 26.6–33)
MCHC RBC AUTO-ENTMCNC: 32.9 G/DL (ref 31.5–35.7)
MCV RBC AUTO: 82.8 FL (ref 79–97)
MONOCYTES # BLD AUTO: 0.33 10*3/MM3 (ref 0.1–0.9)
MONOCYTES NFR BLD AUTO: 5.5 % (ref 5–12)
NEUTROPHILS NFR BLD AUTO: 4.03 10*3/MM3 (ref 1.7–7)
NEUTROPHILS NFR BLD AUTO: 67 % (ref 42.7–76)
NRBC BLD AUTO-RTO: 0 /100 WBC (ref 0–0.2)
PLATELET # BLD AUTO: 297 10*3/MM3 (ref 140–450)
PMV BLD AUTO: 9.8 FL (ref 6–12)
POTASSIUM SERPL-SCNC: 4.1 MMOL/L (ref 3.5–5.2)
PROT SERPL-MCNC: 6.8 G/DL (ref 6–8.5)
RBC # BLD AUTO: 4.3 10*6/MM3 (ref 3.77–5.28)
SODIUM SERPL-SCNC: 137 MMOL/L (ref 136–145)
WBC NRBC COR # BLD: 6.02 10*3/MM3 (ref 3.4–10.8)

## 2023-01-18 PROCEDURE — 84466 ASSAY OF TRANSFERRIN: CPT | Performed by: NURSE PRACTITIONER

## 2023-01-18 PROCEDURE — 82607 VITAMIN B-12: CPT | Performed by: NURSE PRACTITIONER

## 2023-01-18 PROCEDURE — 82306 VITAMIN D 25 HYDROXY: CPT | Performed by: NURSE PRACTITIONER

## 2023-01-18 PROCEDURE — 36415 COLL VENOUS BLD VENIPUNCTURE: CPT | Performed by: NURSE PRACTITIONER

## 2023-01-18 PROCEDURE — 83540 ASSAY OF IRON: CPT | Performed by: NURSE PRACTITIONER

## 2023-01-18 PROCEDURE — 80050 GENERAL HEALTH PANEL: CPT | Performed by: NURSE PRACTITIONER

## 2023-01-19 LAB
25(OH)D3 SERPL-MCNC: 25.7 NG/ML (ref 30–100)
IRON 24H UR-MRATE: 77 MCG/DL (ref 37–145)
IRON SATN MFR SERPL: 17 % (ref 20–50)
TIBC SERPL-MCNC: 451 MCG/DL (ref 298–536)
TRANSFERRIN SERPL-MCNC: 303 MG/DL (ref 200–360)
TSH SERPL DL<=0.05 MIU/L-ACNC: 0.04 UIU/ML (ref 0.27–4.2)
VIT B12 BLD-MCNC: 592 PG/ML (ref 211–946)

## 2023-01-23 ENCOUNTER — TELEPHONE (OUTPATIENT)
Dept: ENDOCRINOLOGY | Facility: CLINIC | Age: 33
End: 2023-01-23
Payer: MEDICAID

## 2023-01-23 DIAGNOSIS — E06.3 HASHIMOTO'S THYROIDITIS: Primary | ICD-10-CM

## 2023-01-23 RX ORDER — LEVOTHYROXINE SODIUM 175 UG/1
TABLET ORAL
Qty: 30 TABLET | Refills: 3 | Status: SHIPPED | OUTPATIENT
Start: 2023-01-23

## 2023-01-23 NOTE — TELEPHONE ENCOUNTER
----- Message from REBECA Blancas sent at 1/19/2023 12:00 PM CST -----  Vitamin d low at 25.7 , thyroid is hyper  at 0.04, keep dosage but take one daily and 1/2 on Sunday

## 2023-04-13 ENCOUNTER — OFFICE VISIT (OUTPATIENT)
Dept: GASTROENTEROLOGY | Facility: CLINIC | Age: 33
End: 2023-04-13
Payer: MEDICAID

## 2023-04-13 VITALS
SYSTOLIC BLOOD PRESSURE: 132 MMHG | BODY MASS INDEX: 40.29 KG/M2 | DIASTOLIC BLOOD PRESSURE: 78 MMHG | HEIGHT: 65 IN | WEIGHT: 241.8 LBS | HEART RATE: 74 BPM

## 2023-04-13 DIAGNOSIS — K62.5 RECTAL BLEEDING: Primary | ICD-10-CM

## 2023-04-13 DIAGNOSIS — R10.30 LOWER ABDOMINAL PAIN: ICD-10-CM

## 2023-04-13 PROCEDURE — 1160F RVW MEDS BY RX/DR IN RCRD: CPT | Performed by: NURSE PRACTITIONER

## 2023-04-13 PROCEDURE — 99213 OFFICE O/P EST LOW 20 MIN: CPT | Performed by: NURSE PRACTITIONER

## 2023-04-13 PROCEDURE — 1159F MED LIST DOCD IN RCRD: CPT | Performed by: NURSE PRACTITIONER

## 2023-04-13 RX ORDER — SODIUM, POTASSIUM,MAG SULFATES 17.5-3.13G
1 SOLUTION, RECONSTITUTED, ORAL ORAL EVERY 12 HOURS
Qty: 354 ML | Refills: 0 | Status: SHIPPED | OUTPATIENT
Start: 2023-04-13

## 2023-04-13 RX ORDER — NORGESTIMATE AND ETHINYL ESTRADIOL 0.25-0.035
KIT ORAL
COMMUNITY
Start: 2023-04-10

## 2023-04-13 RX ORDER — SODIUM CHLORIDE 9 MG/ML
40 INJECTION, SOLUTION INTRAVENOUS AS NEEDED
OUTPATIENT
Start: 2023-04-13

## 2023-04-13 RX ORDER — DEXTROSE AND SODIUM CHLORIDE 5; .45 G/100ML; G/100ML
30 INJECTION, SOLUTION INTRAVENOUS CONTINUOUS PRN
OUTPATIENT
Start: 2023-04-13

## 2023-04-13 NOTE — PROGRESS NOTES
Chief Complaint   Patient presents with   • Constipation   • Hemorrhoids       Subjective    Jerrica Marquez is a 32 y.o. female. she is here today for follow-up.                                                                  Assessment & Plan                                     1. Rectal bleeding    2. Lower abdominal pain      Plan; schedule colonoscopy to further evaluate rectal bleeding lower abdominal pain discussed concern for internal hemorrhoid versus diverticular bleed.  Encouraged high-fiber diet avoid heavy lifting or activity that exacerbate bleeding.  Discussed planning hemorrhoidal banding if needed pending results of the above    Follow-up: Return in about 4 weeks (around 5/11/2023) for Recheck, After test.     HPI  32-year-old female presents to discuss rectal bleeding that started about a month ago.  She has concurrent medical history of hypothyroidism, dysmenorrhea, myopia, vitamin D deficiency and depression.  Initially felt to be related to increase of weights when she was squatting however noticed it happened with any straining has stopped doing any weight lifting only doing walking for exercise right now reports bowel movements are somewhat constipated but does not have to strain a large amount to have a bowel movement.  Reports blood is always bright red more around the stool but occasionally has noted clots had rectal exam that did not note any external hemorrhoids.  Has been using Preparation H when has rectal pain and that seems to improve symptoms.  She does take aspirin daily.  She denies any known family history of colorectal cancer or polyps.    Review of Systems  Review of Systems   Constitutional: Positive for fatigue. Negative for activity change, appetite change, chills, diaphoresis, fever and unexpected weight change.   HENT: Negative for sore throat and trouble  "swallowing.    Respiratory: Negative for shortness of breath.    Gastrointestinal: Positive for abdominal pain (lower abdominal pain prior to bm ), blood in stool and constipation (about every other day ). Negative for abdominal distention, anal bleeding, diarrhea, nausea, rectal pain and vomiting.        Increased weights with squatting exercise and began having large amount of bright red blood appears somewhat  but occasional clots    Musculoskeletal: Negative for arthralgias.   Skin: Negative for pallor.   Neurological: Negative for light-headedness.       /78 (BP Location: Left arm)   Pulse 74   Ht 165.1 cm (65\")   Wt 110 kg (241 lb 12.8 oz)   BMI 40.24 kg/m²     Objective      Physical Exam  Constitutional:       General: She is not in acute distress.     Appearance: Normal appearance. She is normal weight. She is not ill-appearing.   HENT:      Head: Normocephalic and atraumatic.   Pulmonary:      Effort: Pulmonary effort is normal.   Abdominal:      General: Abdomen is flat. Bowel sounds are normal. There is no distension.      Palpations: Abdomen is soft. There is no mass.      Tenderness: There is no abdominal tenderness.   Neurological:      Mental Status: She is alert.               The following portions of the patient's history were reviewed and updated as appropriate:   Past Medical History:   Diagnosis Date   • Abdominal pain     generalized cramping after eating wheat type products   • Acquired hypothyroidism     Secondary to Hashimoto's thyroiditis   • Back pain affecting pregnancy in second trimester    • Dysmenorrhea    • Endogenous obesity    • Excessive or frequent menstruation    • Hashimoto's thyroiditis    • Headache    • Hypertension 2017    gestational hypertension   • Irritable bowel syndrome    • Myopia    • Vitamin D deficiency      Past Surgical History:   Procedure Laterality Date   •  SECTION N/A 2017    Procedure:  SECTION PRIMARY;  " Surgeon: Jayne Thibodeaux MD;  Location: [facility] LABOR DELIVERY;  Service:    •  SECTION N/A 2019    Procedure:  SECTION REPEAT;  Surgeon: Vazquez Davis DO;  Location: [facility] LABOR DELIVERY;  Service: Obstetrics   • INJECTION OF MEDICATION  2013    Depo Provera (medroxyprogesterone acetate)   • INJECTION OF MEDICATION  2012    Kenalog x2   • INJECTION OF MEDICATION  2015    Toradol   • INJECTION OF MEDICATION  2015    Zofran   • WISDOM TOOTH EXTRACTION       Family History   Problem Relation Age of Onset   • Asthma Other    • Hypertension Other    • Other Other         Thyroid Disorder, Depressive Disorder, Smoking Tobacco, Anxiety   • Rheum arthritis Mother    • Irritable bowel syndrome Mother    • Hypertension Father    • Heart disease Maternal Grandfather    • Hypertension Maternal Grandfather    • Osteoporosis Paternal Grandmother    • Stroke Paternal Grandmother    • Anemia Paternal Grandmother    • Pulmonary embolism Maternal Grandmother      OB History        2    Para   2    Term   2            AB        Living   2       SAB        IAB        Ectopic        Molar        Multiple   0    Live Births   2              Allergies   Allergen Reactions   • Cefprozil Hives   • Eggs Or Egg-Derived Products Diarrhea and Nausea And Vomiting     Pt says she is NOT allergic to eggs/dislikes them.   • Influenza Vaccines      Migraines/ vomiting has reaction 2015- not in past      Social History     Socioeconomic History   • Marital status:    Tobacco Use   • Smoking status: Never   • Smokeless tobacco: Never   Substance and Sexual Activity   • Alcohol use: No   • Drug use: No   • Sexual activity: Yes     Partners: Male     Current Medications:  Prior to Admission medications    Medication Sig Start Date End Date Taking? Authorizing Provider   aspirin 81 MG EC tablet Take 1 tablet by mouth Daily.   Yes Provider, MD Tess   Cholecalciferol  (VITAMIN D3 PO) 1,000 Units 2 (Two) Times a Week.   Yes Emergency, Nurse Neli RN   citalopram (CeleXA) 20 MG tablet Take 1 tablet by mouth Daily.   Yes Provider, MD Tess   levothyroxine (SYNTHROID, LEVOTHROID) 175 MCG tablet One tablet Monday through Saturday and half a tablet on Sunday 1/23/23  Yes Beto Aquino APRN   Multiple Vitamins-Minerals (MULTIVITAMIN ADULT PO) Take  by mouth.   Yes Emergency, Nurse Neli RN   norgestimate-ethinyl estradiol (ORTHO-CYCLEN) 0.25-35 MG-MCG per tablet  4/10/23  Yes Provider, MD Tess   acetic acid-hydrocortisone (VOSOL-HC) 1-2 % otic solution Administer 4 drops into ear(s) as directed by provider 2 (Two) Times a Week.  Patient not taking: Reported on 4/13/2023 8/5/21   Jose Ervin MD     Orders placed during this encounter include:  Orders Placed This Encounter   Procedures   • Obtain Informed Consent     Standing Status:   Future     Order Specific Question:   Informed Consent Given For     Answer:   colonoscopy     COLONOSCOPY (N/A)  New Medications Ordered This Visit   Medications   • sodium-potassium-magnesium sulfates (Suprep Bowel Prep Kit) 17.5-3.13-1.6 GM/177ML solution oral solution     Sig: Take 1 bottle by mouth Every 12 (Twelve) Hours.     Dispense:  354 mL     Refill:  0         Review and/or summary of lab tests, radiology, procedures, medications. Review and summary of old records and obtaining of history. The risks and benefits of my recommendations, as well as other treatment options were discussed . Any questions/concerned were answered. Patient voiced understanding and agreement.          This document has been electronically signed by REBECA Andrade on April 13, 2023 09:39 CDT                                               Results for orders placed or performed in visit on 01/16/23   CBC Auto Differential    Specimen: Blood   Result Value Ref Range    WBC 6.02 3.40 - 10.80 10*3/mm3    RBC 4.30 3.77 - 5.28 10*6/mm3     Hemoglobin 11.7 (L) 12.0 - 15.9 g/dL    Hematocrit 35.6 34.0 - 46.6 %    MCV 82.8 79.0 - 97.0 fL    MCH 27.2 26.6 - 33.0 pg    MCHC 32.9 31.5 - 35.7 g/dL    RDW 14.6 12.3 - 15.4 %    RDW-SD 44.0 37.0 - 54.0 fl    MPV 9.8 6.0 - 12.0 fL    Platelets 297 140 - 450 10*3/mm3    Neutrophil % 67.0 42.7 - 76.0 %    Lymphocyte % 24.9 19.6 - 45.3 %    Monocyte % 5.5 5.0 - 12.0 %    Eosinophil % 1.8 0.3 - 6.2 %    Basophil % 0.5 0.0 - 1.5 %    Immature Grans % 0.3 0.0 - 0.5 %    Neutrophils, Absolute 4.03 1.70 - 7.00 10*3/mm3    Lymphocytes, Absolute 1.50 0.70 - 3.10 10*3/mm3    Monocytes, Absolute 0.33 0.10 - 0.90 10*3/mm3    Eosinophils, Absolute 0.11 0.00 - 0.40 10*3/mm3    Basophils, Absolute 0.03 0.00 - 0.20 10*3/mm3    Immature Grans, Absolute 0.02 0.00 - 0.05 10*3/mm3    nRBC 0.0 0.0 - 0.2 /100 WBC   Iron Profile    Specimen: Blood   Result Value Ref Range    Iron 77 37 - 145 mcg/dL    Iron Saturation 17 (L) 20 - 50 %    Transferrin 303 200 - 360 mg/dL    TIBC 451 298 - 536 mcg/dL   Vitamin D,25-Hydroxy    Specimen: Blood   Result Value Ref Range    25 Hydroxy, Vitamin D 25.7 (L) 30.0 - 100.0 ng/ml   TSH    Specimen: Blood   Result Value Ref Range    TSH 0.040 (L) 0.270 - 4.200 uIU/mL   Vitamin B12    Specimen: Blood   Result Value Ref Range    Vitamin B-12 592 211 - 946 pg/mL   Comprehensive Metabolic Panel    Specimen: Blood   Result Value Ref Range    Glucose 126 (H) 65 - 99 mg/dL    BUN 9 6 - 20 mg/dL    Creatinine 0.73 0.57 - 1.00 mg/dL    Sodium 137 136 - 145 mmol/L    Potassium 4.1 3.5 - 5.2 mmol/L    Chloride 102 98 - 107 mmol/L    CO2 26.0 22.0 - 29.0 mmol/L    Calcium 8.9 8.6 - 10.5 mg/dL    Total Protein 6.8 6.0 - 8.5 g/dL    Albumin 4.0 3.5 - 5.2 g/dL    ALT (SGPT) 15 1 - 33 U/L    AST (SGOT) 19 1 - 32 U/L    Alkaline Phosphatase 62 39 - 117 U/L    Total Bilirubin 0.3 0.0 - 1.2 mg/dL    Globulin 2.8 gm/dL    A/G Ratio 1.4 g/dL    BUN/Creatinine Ratio 12.3 7.0 - 25.0    Anion Gap 9.0 5.0 - 15.0 mmol/L    eGFR  112.9 >60.0 mL/min/1.73   Results for orders placed or performed in visit on 09/20/22   CBC Auto Differential    Specimen: Blood   Result Value Ref Range    WBC 6.66 3.40 - 10.80 10*3/mm3    RBC 4.35 3.77 - 5.28 10*6/mm3    Hemoglobin 11.5 (L) 12.0 - 15.9 g/dL    Hematocrit 35.7 34.0 - 46.6 %    MCV 82.1 79.0 - 97.0 fL    MCH 26.4 (L) 26.6 - 33.0 pg    MCHC 32.2 31.5 - 35.7 g/dL    RDW 13.7 12.3 - 15.4 %    RDW-SD 40.7 37.0 - 54.0 fl    MPV 9.6 6.0 - 12.0 fL    Platelets 350 140 - 450 10*3/mm3    Neutrophil % 67.0 42.7 - 76.0 %    Lymphocyte % 22.8 19.6 - 45.3 %    Monocyte % 7.8 5.0 - 12.0 %    Eosinophil % 1.5 0.3 - 6.2 %    Basophil % 0.6 0.0 - 1.5 %    Immature Grans % 0.3 0.0 - 0.5 %    Neutrophils, Absolute 4.46 1.70 - 7.00 10*3/mm3    Lymphocytes, Absolute 1.52 0.70 - 3.10 10*3/mm3    Monocytes, Absolute 0.52 0.10 - 0.90 10*3/mm3    Eosinophils, Absolute 0.10 0.00 - 0.40 10*3/mm3    Basophils, Absolute 0.04 0.00 - 0.20 10*3/mm3    Immature Grans, Absolute 0.02 0.00 - 0.05 10*3/mm3    nRBC 0.0 0.0 - 0.2 /100 WBC   Iron Profile    Specimen: Blood   Result Value Ref Range    Iron 53 37 - 145 mcg/dL    Iron Saturation 12 (L) 20 - 50 %    Transferrin 297 200 - 360 mg/dL    TIBC 443 298 - 536 mcg/dL   Vitamin D 25 Hydroxy    Specimen: Blood   Result Value Ref Range    25 Hydroxy, Vitamin D 28.7 (L) 30.0 - 100.0 ng/ml   TSH    Specimen: Blood   Result Value Ref Range    TSH 0.029 (L) 0.270 - 4.200 uIU/mL   Comprehensive Metabolic Panel    Specimen: Blood   Result Value Ref Range    Glucose 82 65 - 99 mg/dL    BUN 9 6 - 20 mg/dL    Creatinine 0.79 0.57 - 1.00 mg/dL    Sodium 140 136 - 145 mmol/L    Potassium 4.0 3.5 - 5.2 mmol/L    Chloride 103 98 - 107 mmol/L    CO2 28.0 22.0 - 29.0 mmol/L    Calcium 9.1 8.6 - 10.5 mg/dL    Total Protein 7.0 6.0 - 8.5 g/dL    Albumin 4.20 3.50 - 5.20 g/dL    ALT (SGPT) 22 1 - 33 U/L    AST (SGOT) 25 1 - 32 U/L    Alkaline Phosphatase 69 39 - 117 U/L    Total Bilirubin 0.3 0.0 - 1.2  mg/dL    Globulin 2.8 gm/dL    A/G Ratio 1.5 g/dL    BUN/Creatinine Ratio 11.4 7.0 - 25.0    Anion Gap 9.0 5.0 - 15.0 mmol/L    eGFR 102.7 >60.0 mL/min/1.73   Results for orders placed or performed in visit on 05/26/22   Vitamin D 25 Hydroxy    Specimen: Blood   Result Value Ref Range    25 Hydroxy, Vitamin D 22.0 (L) 30.0 - 100.0 ng/ml     *Note: Due to a large number of results and/or encounters for the requested time period, some results have not been displayed. A complete set of results can be found in Results Review.

## 2023-04-13 NOTE — H&P (VIEW-ONLY)
Chief Complaint   Patient presents with   • Constipation   • Hemorrhoids       Subjective    Jerrica Marquez is a 32 y.o. female. she is here today for follow-up.                                                                  Assessment & Plan                                     1. Rectal bleeding    2. Lower abdominal pain      Plan; schedule colonoscopy to further evaluate rectal bleeding lower abdominal pain discussed concern for internal hemorrhoid versus diverticular bleed.  Encouraged high-fiber diet avoid heavy lifting or activity that exacerbate bleeding.  Discussed planning hemorrhoidal banding if needed pending results of the above    Follow-up: Return in about 4 weeks (around 5/11/2023) for Recheck, After test.     HPI  32-year-old female presents to discuss rectal bleeding that started about a month ago.  She has concurrent medical history of hypothyroidism, dysmenorrhea, myopia, vitamin D deficiency and depression.  Initially felt to be related to increase of weights when she was squatting however noticed it happened with any straining has stopped doing any weight lifting only doing walking for exercise right now reports bowel movements are somewhat constipated but does not have to strain a large amount to have a bowel movement.  Reports blood is always bright red more around the stool but occasionally has noted clots had rectal exam that did not note any external hemorrhoids.  Has been using Preparation H when has rectal pain and that seems to improve symptoms.  She does take aspirin daily.  She denies any known family history of colorectal cancer or polyps.    Review of Systems  Review of Systems   Constitutional: Positive for fatigue. Negative for activity change, appetite change, chills, diaphoresis, fever and unexpected weight change.   HENT: Negative for sore throat and trouble  "swallowing.    Respiratory: Negative for shortness of breath.    Gastrointestinal: Positive for abdominal pain (lower abdominal pain prior to bm ), blood in stool and constipation (about every other day ). Negative for abdominal distention, anal bleeding, diarrhea, nausea, rectal pain and vomiting.        Increased weights with squatting exercise and began having large amount of bright red blood appears somewhat  but occasional clots    Musculoskeletal: Negative for arthralgias.   Skin: Negative for pallor.   Neurological: Negative for light-headedness.       /78 (BP Location: Left arm)   Pulse 74   Ht 165.1 cm (65\")   Wt 110 kg (241 lb 12.8 oz)   BMI 40.24 kg/m²     Objective      Physical Exam  Constitutional:       General: She is not in acute distress.     Appearance: Normal appearance. She is normal weight. She is not ill-appearing.   HENT:      Head: Normocephalic and atraumatic.   Pulmonary:      Effort: Pulmonary effort is normal.   Abdominal:      General: Abdomen is flat. Bowel sounds are normal. There is no distension.      Palpations: Abdomen is soft. There is no mass.      Tenderness: There is no abdominal tenderness.   Neurological:      Mental Status: She is alert.               The following portions of the patient's history were reviewed and updated as appropriate:   Past Medical History:   Diagnosis Date   • Abdominal pain     generalized cramping after eating wheat type products   • Acquired hypothyroidism     Secondary to Hashimoto's thyroiditis   • Back pain affecting pregnancy in second trimester    • Dysmenorrhea    • Endogenous obesity    • Excessive or frequent menstruation    • Hashimoto's thyroiditis    • Headache    • Hypertension 2017    gestational hypertension   • Irritable bowel syndrome    • Myopia    • Vitamin D deficiency      Past Surgical History:   Procedure Laterality Date   •  SECTION N/A 2017    Procedure:  SECTION PRIMARY;  " Surgeon: Jayne Thibodeaux MD;  Location: [facility] LABOR DELIVERY;  Service:    •  SECTION N/A 2019    Procedure:  SECTION REPEAT;  Surgeon: Vazquez Davis DO;  Location: [facility] LABOR DELIVERY;  Service: Obstetrics   • INJECTION OF MEDICATION  2013    Depo Provera (medroxyprogesterone acetate)   • INJECTION OF MEDICATION  2012    Kenalog x2   • INJECTION OF MEDICATION  2015    Toradol   • INJECTION OF MEDICATION  2015    Zofran   • WISDOM TOOTH EXTRACTION       Family History   Problem Relation Age of Onset   • Asthma Other    • Hypertension Other    • Other Other         Thyroid Disorder, Depressive Disorder, Smoking Tobacco, Anxiety   • Rheum arthritis Mother    • Irritable bowel syndrome Mother    • Hypertension Father    • Heart disease Maternal Grandfather    • Hypertension Maternal Grandfather    • Osteoporosis Paternal Grandmother    • Stroke Paternal Grandmother    • Anemia Paternal Grandmother    • Pulmonary embolism Maternal Grandmother      OB History        2    Para   2    Term   2            AB        Living   2       SAB        IAB        Ectopic        Molar        Multiple   0    Live Births   2              Allergies   Allergen Reactions   • Cefprozil Hives   • Eggs Or Egg-Derived Products Diarrhea and Nausea And Vomiting     Pt says she is NOT allergic to eggs/dislikes them.   • Influenza Vaccines      Migraines/ vomiting has reaction 2015- not in past      Social History     Socioeconomic History   • Marital status:    Tobacco Use   • Smoking status: Never   • Smokeless tobacco: Never   Substance and Sexual Activity   • Alcohol use: No   • Drug use: No   • Sexual activity: Yes     Partners: Male     Current Medications:  Prior to Admission medications    Medication Sig Start Date End Date Taking? Authorizing Provider   aspirin 81 MG EC tablet Take 1 tablet by mouth Daily.   Yes Provider, MD Tess   Cholecalciferol  (VITAMIN D3 PO) 1,000 Units 2 (Two) Times a Week.   Yes Emergency, Nurse Neli RN   citalopram (CeleXA) 20 MG tablet Take 1 tablet by mouth Daily.   Yes Provider, MD Tess   levothyroxine (SYNTHROID, LEVOTHROID) 175 MCG tablet One tablet Monday through Saturday and half a tablet on Sunday 1/23/23  Yes Beto Aquino APRN   Multiple Vitamins-Minerals (MULTIVITAMIN ADULT PO) Take  by mouth.   Yes Emergency, Nurse Neli RN   norgestimate-ethinyl estradiol (ORTHO-CYCLEN) 0.25-35 MG-MCG per tablet  4/10/23  Yes Provider, MD Tess   acetic acid-hydrocortisone (VOSOL-HC) 1-2 % otic solution Administer 4 drops into ear(s) as directed by provider 2 (Two) Times a Week.  Patient not taking: Reported on 4/13/2023 8/5/21   Jose Ervin MD     Orders placed during this encounter include:  Orders Placed This Encounter   Procedures   • Obtain Informed Consent     Standing Status:   Future     Order Specific Question:   Informed Consent Given For     Answer:   colonoscopy     COLONOSCOPY (N/A)  New Medications Ordered This Visit   Medications   • sodium-potassium-magnesium sulfates (Suprep Bowel Prep Kit) 17.5-3.13-1.6 GM/177ML solution oral solution     Sig: Take 1 bottle by mouth Every 12 (Twelve) Hours.     Dispense:  354 mL     Refill:  0         Review and/or summary of lab tests, radiology, procedures, medications. Review and summary of old records and obtaining of history. The risks and benefits of my recommendations, as well as other treatment options were discussed . Any questions/concerned were answered. Patient voiced understanding and agreement.          This document has been electronically signed by REBECA Andrade on April 13, 2023 09:39 CDT                                               Results for orders placed or performed in visit on 01/16/23   CBC Auto Differential    Specimen: Blood   Result Value Ref Range    WBC 6.02 3.40 - 10.80 10*3/mm3    RBC 4.30 3.77 - 5.28 10*6/mm3     Hemoglobin 11.7 (L) 12.0 - 15.9 g/dL    Hematocrit 35.6 34.0 - 46.6 %    MCV 82.8 79.0 - 97.0 fL    MCH 27.2 26.6 - 33.0 pg    MCHC 32.9 31.5 - 35.7 g/dL    RDW 14.6 12.3 - 15.4 %    RDW-SD 44.0 37.0 - 54.0 fl    MPV 9.8 6.0 - 12.0 fL    Platelets 297 140 - 450 10*3/mm3    Neutrophil % 67.0 42.7 - 76.0 %    Lymphocyte % 24.9 19.6 - 45.3 %    Monocyte % 5.5 5.0 - 12.0 %    Eosinophil % 1.8 0.3 - 6.2 %    Basophil % 0.5 0.0 - 1.5 %    Immature Grans % 0.3 0.0 - 0.5 %    Neutrophils, Absolute 4.03 1.70 - 7.00 10*3/mm3    Lymphocytes, Absolute 1.50 0.70 - 3.10 10*3/mm3    Monocytes, Absolute 0.33 0.10 - 0.90 10*3/mm3    Eosinophils, Absolute 0.11 0.00 - 0.40 10*3/mm3    Basophils, Absolute 0.03 0.00 - 0.20 10*3/mm3    Immature Grans, Absolute 0.02 0.00 - 0.05 10*3/mm3    nRBC 0.0 0.0 - 0.2 /100 WBC   Iron Profile    Specimen: Blood   Result Value Ref Range    Iron 77 37 - 145 mcg/dL    Iron Saturation 17 (L) 20 - 50 %    Transferrin 303 200 - 360 mg/dL    TIBC 451 298 - 536 mcg/dL   Vitamin D,25-Hydroxy    Specimen: Blood   Result Value Ref Range    25 Hydroxy, Vitamin D 25.7 (L) 30.0 - 100.0 ng/ml   TSH    Specimen: Blood   Result Value Ref Range    TSH 0.040 (L) 0.270 - 4.200 uIU/mL   Vitamin B12    Specimen: Blood   Result Value Ref Range    Vitamin B-12 592 211 - 946 pg/mL   Comprehensive Metabolic Panel    Specimen: Blood   Result Value Ref Range    Glucose 126 (H) 65 - 99 mg/dL    BUN 9 6 - 20 mg/dL    Creatinine 0.73 0.57 - 1.00 mg/dL    Sodium 137 136 - 145 mmol/L    Potassium 4.1 3.5 - 5.2 mmol/L    Chloride 102 98 - 107 mmol/L    CO2 26.0 22.0 - 29.0 mmol/L    Calcium 8.9 8.6 - 10.5 mg/dL    Total Protein 6.8 6.0 - 8.5 g/dL    Albumin 4.0 3.5 - 5.2 g/dL    ALT (SGPT) 15 1 - 33 U/L    AST (SGOT) 19 1 - 32 U/L    Alkaline Phosphatase 62 39 - 117 U/L    Total Bilirubin 0.3 0.0 - 1.2 mg/dL    Globulin 2.8 gm/dL    A/G Ratio 1.4 g/dL    BUN/Creatinine Ratio 12.3 7.0 - 25.0    Anion Gap 9.0 5.0 - 15.0 mmol/L    eGFR  112.9 >60.0 mL/min/1.73   Results for orders placed or performed in visit on 09/20/22   CBC Auto Differential    Specimen: Blood   Result Value Ref Range    WBC 6.66 3.40 - 10.80 10*3/mm3    RBC 4.35 3.77 - 5.28 10*6/mm3    Hemoglobin 11.5 (L) 12.0 - 15.9 g/dL    Hematocrit 35.7 34.0 - 46.6 %    MCV 82.1 79.0 - 97.0 fL    MCH 26.4 (L) 26.6 - 33.0 pg    MCHC 32.2 31.5 - 35.7 g/dL    RDW 13.7 12.3 - 15.4 %    RDW-SD 40.7 37.0 - 54.0 fl    MPV 9.6 6.0 - 12.0 fL    Platelets 350 140 - 450 10*3/mm3    Neutrophil % 67.0 42.7 - 76.0 %    Lymphocyte % 22.8 19.6 - 45.3 %    Monocyte % 7.8 5.0 - 12.0 %    Eosinophil % 1.5 0.3 - 6.2 %    Basophil % 0.6 0.0 - 1.5 %    Immature Grans % 0.3 0.0 - 0.5 %    Neutrophils, Absolute 4.46 1.70 - 7.00 10*3/mm3    Lymphocytes, Absolute 1.52 0.70 - 3.10 10*3/mm3    Monocytes, Absolute 0.52 0.10 - 0.90 10*3/mm3    Eosinophils, Absolute 0.10 0.00 - 0.40 10*3/mm3    Basophils, Absolute 0.04 0.00 - 0.20 10*3/mm3    Immature Grans, Absolute 0.02 0.00 - 0.05 10*3/mm3    nRBC 0.0 0.0 - 0.2 /100 WBC   Iron Profile    Specimen: Blood   Result Value Ref Range    Iron 53 37 - 145 mcg/dL    Iron Saturation 12 (L) 20 - 50 %    Transferrin 297 200 - 360 mg/dL    TIBC 443 298 - 536 mcg/dL   Vitamin D 25 Hydroxy    Specimen: Blood   Result Value Ref Range    25 Hydroxy, Vitamin D 28.7 (L) 30.0 - 100.0 ng/ml   TSH    Specimen: Blood   Result Value Ref Range    TSH 0.029 (L) 0.270 - 4.200 uIU/mL   Comprehensive Metabolic Panel    Specimen: Blood   Result Value Ref Range    Glucose 82 65 - 99 mg/dL    BUN 9 6 - 20 mg/dL    Creatinine 0.79 0.57 - 1.00 mg/dL    Sodium 140 136 - 145 mmol/L    Potassium 4.0 3.5 - 5.2 mmol/L    Chloride 103 98 - 107 mmol/L    CO2 28.0 22.0 - 29.0 mmol/L    Calcium 9.1 8.6 - 10.5 mg/dL    Total Protein 7.0 6.0 - 8.5 g/dL    Albumin 4.20 3.50 - 5.20 g/dL    ALT (SGPT) 22 1 - 33 U/L    AST (SGOT) 25 1 - 32 U/L    Alkaline Phosphatase 69 39 - 117 U/L    Total Bilirubin 0.3 0.0 - 1.2  mg/dL    Globulin 2.8 gm/dL    A/G Ratio 1.5 g/dL    BUN/Creatinine Ratio 11.4 7.0 - 25.0    Anion Gap 9.0 5.0 - 15.0 mmol/L    eGFR 102.7 >60.0 mL/min/1.73   Results for orders placed or performed in visit on 05/26/22   Vitamin D 25 Hydroxy    Specimen: Blood   Result Value Ref Range    25 Hydroxy, Vitamin D 22.0 (L) 30.0 - 100.0 ng/ml     *Note: Due to a large number of results and/or encounters for the requested time period, some results have not been displayed. A complete set of results can be found in Results Review.

## 2023-05-03 ENCOUNTER — TELEMEDICINE (OUTPATIENT)
Dept: ENDOCRINOLOGY | Facility: CLINIC | Age: 33
End: 2023-05-03
Payer: MEDICAID

## 2023-05-03 DIAGNOSIS — E61.1 IRON DEFICIENCY: ICD-10-CM

## 2023-05-03 DIAGNOSIS — E06.3 HASHIMOTO'S THYROIDITIS: Primary | ICD-10-CM

## 2023-05-03 DIAGNOSIS — E55.9 VITAMIN D DEFICIENCY: ICD-10-CM

## 2023-05-03 NOTE — PROGRESS NOTES
Chief Complaint  Thyroid   Subjective          Jerrica Marquez presents to [facility] ENDOCRINOLOGY  Hashimoto's Thyroiditis       You have chosen to receive care through a telehealth visit.  Do you consent to use a video/audio connection for your medical care today? Yes          TELEHEALTH VIDEO VISIT     This a video visit due to [facility] current guidelines for social distancing due to the COVID 19 pandemic      Mode of Visit: Video  Location of patient: home  You have chosen to receive care through a telehealth visit.  Does the patient consent to use a video/audio connection for your medical care today? Yes  The visit included audio and video interaction. No technical issues occurred during this visit.     Primary care provider      REBECA Navas     32 year old female presents for follow up     Hypothyroidism due to Hashimoto's     Diagnosed in 2010     Timing constant     Quality controlled    Severity moderate           Vitamin d def    Taking supplements            Review of Systems - General ROS: negative          Objective   Vital Signs:   There were no vitals taken for this visit.    Physical Exam  Neurological:      General: No focal deficit present.      Mental Status: She is alert.   Psychiatric:         Mood and Affect: Mood normal.         Thought Content: Thought content normal.         Judgment: Judgment normal.        Result Review :   The following data was reviewed by: REBECA Blancas on 06/02/2022:  Common labs        5/26/2022    13:24 9/20/2022    10:00 1/18/2023    12:47   Common Labs   Glucose 79   82   126     BUN 11   9   9     Creatinine 0.92   0.79   0.73     Sodium 138   140   137     Potassium 3.8   4.0   4.1     Chloride 104   103   102     Calcium 9.1   9.1   8.9     Albumin 4.00   4.20   4.0     Total Bilirubin 0.2   0.3   0.3     Alkaline Phosphatase 73   69   62     AST (SGOT) 20   25   19     ALT (SGPT) 17   22   15     WBC 7.37    6.66   6.02     Hemoglobin 11.9   11.5   11.7     Hematocrit 35.2   35.7   35.6     Platelets 277   350   297                   Assessment and Plan    Diagnoses and all orders for this visit:    1. Hashimoto's thyroiditis (Primary)  -     CBC & Differential  -     Comprehensive Metabolic Panel  -     TSH  -     Vitamin B12  -     Vitamin D,25-Hydroxy    2. Vitamin D deficiency  -     CBC & Differential  -     Comprehensive Metabolic Panel  -     TSH  -     Vitamin B12  -     Vitamin D,25-Hydroxy    3. Iron deficiency           Hypothyroidism due to hashimoto's           Levothyroxine Taking 175 mcg one daily and on Sunday take none           Lab Results   Component Value Date    TSH 0.040 (L) 01/18/2023          Vitamin d def.        Taking MVI daily                B12 nl         Lab Results   Component Value Date    PXRCCTNH48 592 01/18/2023            Iron def. Anemia    Taking  iron daily            Having a colonoscopy next week         Follow Up   No follow-ups on file.  Patient was given instructions and counseling regarding her condition or for health maintenance advice. Please see specific information pulled into the AVS if appropriate.         This document has been electronically signed by REBECA Blancas on May 3, 2023 15:44 CDT.

## 2023-05-09 ENCOUNTER — ANESTHESIA EVENT (OUTPATIENT)
Dept: GASTROENTEROLOGY | Facility: HOSPITAL | Age: 33
End: 2023-05-09
Payer: MEDICAID

## 2023-05-09 ENCOUNTER — ANESTHESIA (OUTPATIENT)
Dept: GASTROENTEROLOGY | Facility: HOSPITAL | Age: 33
End: 2023-05-09
Payer: MEDICAID

## 2023-05-09 ENCOUNTER — HOSPITAL ENCOUNTER (OUTPATIENT)
Facility: HOSPITAL | Age: 33
Setting detail: HOSPITAL OUTPATIENT SURGERY
Discharge: HOME | End: 2023-05-09
Attending: INTERNAL MEDICINE | Admitting: INTERNAL MEDICINE
Payer: MEDICAID

## 2023-05-09 VITALS
BODY MASS INDEX: 39.32 KG/M2 | HEIGHT: 65 IN | WEIGHT: 236 LBS | RESPIRATION RATE: 18 BRPM | DIASTOLIC BLOOD PRESSURE: 76 MMHG | HEART RATE: 101 BPM | OXYGEN SATURATION: 96 % | SYSTOLIC BLOOD PRESSURE: 144 MMHG | TEMPERATURE: 98.6 F

## 2023-05-09 DIAGNOSIS — K62.5 RECTAL BLEEDING: ICD-10-CM

## 2023-05-09 DIAGNOSIS — R10.30 LOWER ABDOMINAL PAIN: ICD-10-CM

## 2023-05-09 PROCEDURE — 25010000002 PROPOFOL 10 MG/ML EMULSION: Performed by: NURSE ANESTHETIST, CERTIFIED REGISTERED

## 2023-05-09 RX ORDER — LIDOCAINE HYDROCHLORIDE 20 MG/ML
INJECTION, SOLUTION INTRAVENOUS AS NEEDED
Status: DISCONTINUED | OUTPATIENT
Start: 2023-05-09 | End: 2023-05-09 | Stop reason: SURG

## 2023-05-09 RX ORDER — PROPOFOL 10 MG/ML
VIAL (ML) INTRAVENOUS AS NEEDED
Status: DISCONTINUED | OUTPATIENT
Start: 2023-05-09 | End: 2023-05-09 | Stop reason: SURG

## 2023-05-09 RX ORDER — SODIUM CHLORIDE 9 MG/ML
40 INJECTION, SOLUTION INTRAVENOUS AS NEEDED
Status: DISCONTINUED | OUTPATIENT
Start: 2023-05-09 | End: 2023-05-09 | Stop reason: HOSPADM

## 2023-05-09 RX ORDER — DEXTROSE AND SODIUM CHLORIDE 5; .45 G/100ML; G/100ML
30 INJECTION, SOLUTION INTRAVENOUS CONTINUOUS PRN
Status: DISCONTINUED | OUTPATIENT
Start: 2023-05-09 | End: 2023-05-09 | Stop reason: HOSPADM

## 2023-05-09 RX ADMIN — PROPOFOL 100 MG: 10 INJECTION, EMULSION INTRAVENOUS at 15:30

## 2023-05-09 RX ADMIN — DEXTROSE AND SODIUM CHLORIDE 30 ML/HR: 5; 450 INJECTION, SOLUTION INTRAVENOUS at 14:59

## 2023-05-09 RX ADMIN — PROPOFOL 50 MG: 10 INJECTION, EMULSION INTRAVENOUS at 15:44

## 2023-05-09 RX ADMIN — PROPOFOL 100 MG: 10 INJECTION, EMULSION INTRAVENOUS at 15:31

## 2023-05-09 RX ADMIN — PROPOFOL 150 MG: 10 INJECTION, EMULSION INTRAVENOUS at 15:37

## 2023-05-09 RX ADMIN — PROPOFOL 50 MG: 10 INJECTION, EMULSION INTRAVENOUS at 15:48

## 2023-05-09 RX ADMIN — LIDOCAINE HYDROCHLORIDE 50 MG: 20 INJECTION, SOLUTION INTRAVENOUS at 15:30

## 2023-05-09 RX ADMIN — PROPOFOL 50 MG: 10 INJECTION, EMULSION INTRAVENOUS at 15:41

## 2023-05-09 NOTE — ANESTHESIA POSTPROCEDURE EVALUATION
Patient: Jerrica Marquez    Procedure Summary     Date: 05/09/23 Room / Location: [facility] ENDOSCOPY 2 / [facility] ENDOSCOPY    Anesthesia Start: 1524 Anesthesia Stop: 1559    Procedure: COLONOSCOPY Diagnosis:       Rectal bleeding      Lower abdominal pain      (Rectal bleeding [K62.5])      (Lower abdominal pain [R10.30])    Surgeons: Michael Galindo MD Provider: Shivam Berry CRNA    Anesthesia Type: general ASA Status: 2          Anesthesia Type: general    Vitals  No vitals data found for the desired time range.          Post Anesthesia Care and Evaluation    Patient location during evaluation: PHASE II  Patient participation: complete - patient participated    Airway patency: patent  Anesthetic complications: No anesthetic complications  PONV Status: none  Cardiovascular status: acceptable  Respiratory status: acceptable, airway suctioned, room air and spontaneous ventilation    Comments: Pt experienced multiple cases of laryngospasm during the case.  Sats dropped into the 70's with first spasm.    Inserted oral airway and continued with wall's maneuver during spams.   Woke pt up before transporting to phase II. Pt able to manage airway on her own, no O2 needed.   SPO2 99% on room air    /65

## 2023-05-09 NOTE — ANESTHESIA PREPROCEDURE EVALUATION
Anesthesia Evaluation     Patient summary reviewed and Nursing notes reviewed   NPO Solid Status: > 8 hours  NPO Liquid Status: > 6 hours           Airway   Mallampati: II  TM distance: >3 FB  Neck ROM: full  No difficulty expected  Dental - normal exam         Pulmonary - normal exam   Cardiovascular - normal exam        Neuro/Psych  (+) psychiatric history Depression and Anxiety,    GI/Hepatic/Renal/Endo    (+) obesity,  GI bleeding , thyroid problem hypothyroidism    Musculoskeletal     Abdominal   (+) obese,    Substance History      OB/GYN          Other                      Anesthesia Plan    ASA 2     general   total IV anesthesia  intravenous induction     Anesthetic plan, risks, benefits, and alternatives have been provided, discussed and informed consent has been obtained with: patient.    Plan discussed with CRNA.        CODE STATUS:

## 2023-05-16 ENCOUNTER — OFFICE VISIT (OUTPATIENT)
Dept: GASTROENTEROLOGY | Facility: CLINIC | Age: 33
End: 2023-05-16
Payer: MEDICAID

## 2023-05-16 VITALS
SYSTOLIC BLOOD PRESSURE: 148 MMHG | WEIGHT: 240.6 LBS | HEART RATE: 82 BPM | BODY MASS INDEX: 40.08 KG/M2 | DIASTOLIC BLOOD PRESSURE: 84 MMHG | HEIGHT: 65 IN

## 2023-05-16 DIAGNOSIS — K64.9 HEMORRHOIDS, UNSPECIFIED HEMORRHOID TYPE: Primary | ICD-10-CM

## 2023-05-16 DIAGNOSIS — K58.2 IRRITABLE BOWEL SYNDROME WITH BOTH CONSTIPATION AND DIARRHEA: ICD-10-CM

## 2023-05-16 PROCEDURE — 1159F MED LIST DOCD IN RCRD: CPT | Performed by: NURSE PRACTITIONER

## 2023-05-16 PROCEDURE — 99213 OFFICE O/P EST LOW 20 MIN: CPT | Performed by: NURSE PRACTITIONER

## 2023-05-16 PROCEDURE — 1160F RVW MEDS BY RX/DR IN RCRD: CPT | Performed by: NURSE PRACTITIONER

## 2023-05-16 RX ORDER — POLYETHYLENE GLYCOL 3350 17 G/17G
17 POWDER, FOR SOLUTION ORAL DAILY
Qty: 578 G | Refills: 5 | Status: SHIPPED | OUTPATIENT
Start: 2023-05-16

## 2023-05-16 RX ORDER — DICYCLOMINE HYDROCHLORIDE 10 MG/1
10 CAPSULE ORAL 4 TIMES DAILY PRN
Qty: 60 CAPSULE | Refills: 5 | Status: SHIPPED | OUTPATIENT
Start: 2023-05-16

## 2023-05-16 NOTE — PROGRESS NOTES
Chief Complaint   Patient presents with   • Constipation       Subjective    Jerrica Marquez is a 32 y.o. female. she is here today for follow-up.                                                                  Assessment & Plan                                     1. Hemorrhoids, unspecified hemorrhoid type    2. Irritable bowel syndrome with both constipation and diarrhea      Plan; discussed importance of bowel regimen recommend start fiber and MiraLAX daily we will add dicyclomine as needed for abdominal cramping or diarrheal symptoms.  Preparation H for symptomatic support if hemorrhoids become an issue discussed procedure of hemorrhoidal banding if symptoms worsen will discuss further at follow-up.  Encouraged to continue with diet lifestyle modifications.  Follow-up in 4 weeks for recheck return office sooner if needed    Follow-up: Return in about 4 weeks (around 6/13/2023) for Recheck.     HPI  32-year-old female presents for recheck after colonoscopy done due to rectal bleeding and lower abdominal pain.  Still has some issues with constipation but states hemorrhoid has been better since she stopped weight lifting approximately 2 months ago.  Reports in the past she has used Bentyl which greatly improved abdominal pain.  States bowel movements are constipated but then around time of her menstrual cycle she will have severe abdominal cramping and her whole body tries to empty out.  Had not seen any blood until yesterday then noted some blood with bowel movement.  Colonoscopy completed 5/9/2023 noted adequate prep nonbleeding internal hemorrhoids were seen described as medium size.  No specimens were collected and repeat recommended at age 46 for surveillance.    Review of Systems  Review of Systems   Constitutional: Positive for fatigue. Negative for activity change, appetite change, chills,  "diaphoresis, fever and unexpected weight change.   HENT: Negative for sore throat and trouble swallowing.    Respiratory: Negative for shortness of breath.    Gastrointestinal: Positive for abdominal pain and constipation (approximately every other day. ). Negative for abdominal distention, anal bleeding, blood in stool, diarrhea, nausea, rectal pain and vomiting.   Musculoskeletal: Negative for arthralgias.   Skin: Negative for pallor.   Neurological: Negative for light-headedness.       /84 (BP Location: Left arm)   Pulse 82   Ht 165.1 cm (65\")   Wt 109 kg (240 lb 9.6 oz)   BMI 40.04 kg/m²     Objective      Physical Exam  Constitutional:       Appearance: Normal appearance. She is normal weight.   Pulmonary:      Effort: Pulmonary effort is normal.   Abdominal:      General: Abdomen is flat. Bowel sounds are normal. There is no distension.      Palpations: Abdomen is soft. There is no mass.      Tenderness: There is no abdominal tenderness.   Neurological:      Mental Status: She is alert.               The following portions of the patient's history were reviewed and updated as appropriate:   Past Medical History:   Diagnosis Date   • Abdominal pain     generalized cramping after eating wheat type products   • Acquired hypothyroidism     Secondary to Hashimoto's thyroiditis   • Back pain affecting pregnancy in second trimester    • Dysmenorrhea    • Endogenous obesity    • Excessive or frequent menstruation    • Hashimoto's thyroiditis    • Headache    • Hypertension 2017    gestational hypertension   • Irritable bowel syndrome    • Myopia    • Vitamin D deficiency      Past Surgical History:   Procedure Laterality Date   •  SECTION N/A 2017    Procedure:  SECTION PRIMARY;  Surgeon: Jayne Thibodeaux MD;  Location: [facility] LABOR DELIVERY;  Service:    •  SECTION N/A 2019    Procedure:  SECTION REPEAT;  Surgeon: Vazquez Davis DO;  Location: [facility]" LABOR DELIVERY;  Service: Obstetrics   • INJECTION OF MEDICATION  2013    Depo Provera (medroxyprogesterone acetate)   • INJECTION OF MEDICATION  2012    Kenalog x2   • INJECTION OF MEDICATION  2015    Toradol   • INJECTION OF MEDICATION  2015    Zofran   • WISDOM TOOTH EXTRACTION       Family History   Problem Relation Age of Onset   • Asthma Other    • Hypertension Other    • Other Other         Thyroid Disorder, Depressive Disorder, Smoking Tobacco, Anxiety   • Rheum arthritis Mother    • Irritable bowel syndrome Mother    • Hypertension Father    • Heart disease Maternal Grandfather    • Hypertension Maternal Grandfather    • Osteoporosis Paternal Grandmother    • Stroke Paternal Grandmother    • Anemia Paternal Grandmother    • Pulmonary embolism Maternal Grandmother      OB History        2    Para   2    Term   2            AB        Living   2       SAB        IAB        Ectopic        Molar        Multiple   0    Live Births   2              Allergies   Allergen Reactions   • Cefprozil Hives   • Eggs Or Egg-Derived Products Diarrhea and Nausea And Vomiting     Pt says she is NOT allergic to eggs/dislikes them.   • Influenza Vaccines      Migraines/ vomiting has reaction 2015- not in past      Social History     Socioeconomic History   • Marital status: Single   Tobacco Use   • Smoking status: Never   • Smokeless tobacco: Never   Vaping Use   • Vaping Use: Never used   Substance and Sexual Activity   • Alcohol use: Yes     Comment: very little   • Drug use: No   • Sexual activity: Yes     Partners: Male     Current Medications:  Prior to Admission medications    Medication Sig Start Date End Date Taking? Authorizing Provider   aspirin 81 MG EC tablet Take 1 tablet by mouth Daily.   Yes Provider, MD Tess   Cholecalciferol (VITAMIN D3 PO) 1,000 Units 2 (Two) Times a Week.   Yes Emergency, Nurse Neli, RN   citalopram (CeleXA) 20 MG tablet Take 1 tablet by mouth  Daily.   Yes Provider, MD Tess   levothyroxine (SYNTHROID, LEVOTHROID) 175 MCG tablet One tablet Monday through Saturday and half a tablet on Sunday 1/23/23  Yes Beto Aquino APRN   Multiple Vitamins-Minerals (MULTIVITAMIN ADULT PO) Take  by mouth.   Yes Emergency, Nurse Neli, RN   norgestimate-ethinyl estradiol (ORTHO-CYCLEN) 0.25-35 MG-MCG per tablet  4/10/23  Yes Provider, MD Tess   sodium-potassium-magnesium sulfates (Suprep Bowel Prep Kit) 17.5-3.13-1.6 GM/177ML solution oral solution Take 1 bottle by mouth Every 12 (Twelve) Hours. 4/13/23 5/16/23  Julieta Barth APRN     Orders placed during this encounter include:  No orders of the defined types were placed in this encounter.    * Surgery not found *  New Medications Ordered This Visit   Medications   • polyethylene glycol (MiraLax) 17 GM/SCOOP powder     Sig: Take 17 g by mouth Daily.     Dispense:  578 g     Refill:  5   • dicyclomine (BENTYL) 10 MG capsule     Sig: Take 1 capsule by mouth 4 (Four) Times a Day As Needed (cramping or diarrhea).     Dispense:  60 capsule     Refill:  5         Review and/or summary of lab tests, radiology, procedures, medications. Review and summary of old records and obtaining of history. The risks and benefits of my recommendations, as well as other treatment options were discussed . Any questions/concerned were answered. Patient voiced understanding and agreement.          This document has been electronically signed by REBECA Andrade on May 16, 2023 10:53 CDT                                               Results for orders placed or performed in visit on 01/16/23   CBC Auto Differential    Specimen: Blood   Result Value Ref Range    WBC 6.02 3.40 - 10.80 10*3/mm3    RBC 4.30 3.77 - 5.28 10*6/mm3    Hemoglobin 11.7 (L) 12.0 - 15.9 g/dL    Hematocrit 35.6 34.0 - 46.6 %    MCV 82.8 79.0 - 97.0 fL    MCH 27.2 26.6 - 33.0 pg    MCHC 32.9 31.5 - 35.7 g/dL    RDW 14.6 12.3 - 15.4 %    RDW-SD 44.0 37.0  - 54.0 fl    MPV 9.8 6.0 - 12.0 fL    Platelets 297 140 - 450 10*3/mm3    Neutrophil % 67.0 42.7 - 76.0 %    Lymphocyte % 24.9 19.6 - 45.3 %    Monocyte % 5.5 5.0 - 12.0 %    Eosinophil % 1.8 0.3 - 6.2 %    Basophil % 0.5 0.0 - 1.5 %    Immature Grans % 0.3 0.0 - 0.5 %    Neutrophils, Absolute 4.03 1.70 - 7.00 10*3/mm3    Lymphocytes, Absolute 1.50 0.70 - 3.10 10*3/mm3    Monocytes, Absolute 0.33 0.10 - 0.90 10*3/mm3    Eosinophils, Absolute 0.11 0.00 - 0.40 10*3/mm3    Basophils, Absolute 0.03 0.00 - 0.20 10*3/mm3    Immature Grans, Absolute 0.02 0.00 - 0.05 10*3/mm3    nRBC 0.0 0.0 - 0.2 /100 WBC   Iron Profile    Specimen: Blood   Result Value Ref Range    Iron 77 37 - 145 mcg/dL    Iron Saturation 17 (L) 20 - 50 %    Transferrin 303 200 - 360 mg/dL    TIBC 451 298 - 536 mcg/dL   Vitamin D,25-Hydroxy    Specimen: Blood   Result Value Ref Range    25 Hydroxy, Vitamin D 25.7 (L) 30.0 - 100.0 ng/ml   TSH    Specimen: Blood   Result Value Ref Range    TSH 0.040 (L) 0.270 - 4.200 uIU/mL   Vitamin B12    Specimen: Blood   Result Value Ref Range    Vitamin B-12 592 211 - 946 pg/mL   Comprehensive Metabolic Panel    Specimen: Blood   Result Value Ref Range    Glucose 126 (H) 65 - 99 mg/dL    BUN 9 6 - 20 mg/dL    Creatinine 0.73 0.57 - 1.00 mg/dL    Sodium 137 136 - 145 mmol/L    Potassium 4.1 3.5 - 5.2 mmol/L    Chloride 102 98 - 107 mmol/L    CO2 26.0 22.0 - 29.0 mmol/L    Calcium 8.9 8.6 - 10.5 mg/dL    Total Protein 6.8 6.0 - 8.5 g/dL    Albumin 4.0 3.5 - 5.2 g/dL    ALT (SGPT) 15 1 - 33 U/L    AST (SGOT) 19 1 - 32 U/L    Alkaline Phosphatase 62 39 - 117 U/L    Total Bilirubin 0.3 0.0 - 1.2 mg/dL    Globulin 2.8 gm/dL    A/G Ratio 1.4 g/dL    BUN/Creatinine Ratio 12.3 7.0 - 25.0    Anion Gap 9.0 5.0 - 15.0 mmol/L    eGFR 112.9 >60.0 mL/min/1.73   Results for orders placed or performed in visit on 09/20/22   CBC Auto Differential    Specimen: Blood   Result Value Ref Range    WBC 6.66 3.40 - 10.80 10*3/mm3    RBC 4.35  3.77 - 5.28 10*6/mm3    Hemoglobin 11.5 (L) 12.0 - 15.9 g/dL    Hematocrit 35.7 34.0 - 46.6 %    MCV 82.1 79.0 - 97.0 fL    MCH 26.4 (L) 26.6 - 33.0 pg    MCHC 32.2 31.5 - 35.7 g/dL    RDW 13.7 12.3 - 15.4 %    RDW-SD 40.7 37.0 - 54.0 fl    MPV 9.6 6.0 - 12.0 fL    Platelets 350 140 - 450 10*3/mm3    Neutrophil % 67.0 42.7 - 76.0 %    Lymphocyte % 22.8 19.6 - 45.3 %    Monocyte % 7.8 5.0 - 12.0 %    Eosinophil % 1.5 0.3 - 6.2 %    Basophil % 0.6 0.0 - 1.5 %    Immature Grans % 0.3 0.0 - 0.5 %    Neutrophils, Absolute 4.46 1.70 - 7.00 10*3/mm3    Lymphocytes, Absolute 1.52 0.70 - 3.10 10*3/mm3    Monocytes, Absolute 0.52 0.10 - 0.90 10*3/mm3    Eosinophils, Absolute 0.10 0.00 - 0.40 10*3/mm3    Basophils, Absolute 0.04 0.00 - 0.20 10*3/mm3    Immature Grans, Absolute 0.02 0.00 - 0.05 10*3/mm3    nRBC 0.0 0.0 - 0.2 /100 WBC   Iron Profile    Specimen: Blood   Result Value Ref Range    Iron 53 37 - 145 mcg/dL    Iron Saturation 12 (L) 20 - 50 %    Transferrin 297 200 - 360 mg/dL    TIBC 443 298 - 536 mcg/dL   Vitamin D 25 Hydroxy    Specimen: Blood   Result Value Ref Range    25 Hydroxy, Vitamin D 28.7 (L) 30.0 - 100.0 ng/ml   TSH    Specimen: Blood   Result Value Ref Range    TSH 0.029 (L) 0.270 - 4.200 uIU/mL   Comprehensive Metabolic Panel    Specimen: Blood   Result Value Ref Range    Glucose 82 65 - 99 mg/dL    BUN 9 6 - 20 mg/dL    Creatinine 0.79 0.57 - 1.00 mg/dL    Sodium 140 136 - 145 mmol/L    Potassium 4.0 3.5 - 5.2 mmol/L    Chloride 103 98 - 107 mmol/L    CO2 28.0 22.0 - 29.0 mmol/L    Calcium 9.1 8.6 - 10.5 mg/dL    Total Protein 7.0 6.0 - 8.5 g/dL    Albumin 4.20 3.50 - 5.20 g/dL    ALT (SGPT) 22 1 - 33 U/L    AST (SGOT) 25 1 - 32 U/L    Alkaline Phosphatase 69 39 - 117 U/L    Total Bilirubin 0.3 0.0 - 1.2 mg/dL    Globulin 2.8 gm/dL    A/G Ratio 1.5 g/dL    BUN/Creatinine Ratio 11.4 7.0 - 25.0    Anion Gap 9.0 5.0 - 15.0 mmol/L    eGFR 102.7 >60.0 mL/min/1.73   Results for orders placed or performed in  visit on 05/26/22   Vitamin D 25 Hydroxy    Specimen: Blood   Result Value Ref Range    25 Hydroxy, Vitamin D 22.0 (L) 30.0 - 100.0 ng/ml     *Note: Due to a large number of results and/or encounters for the requested time period, some results have not been displayed. A complete set of results can be found in Results Review.

## 2023-05-26 PROCEDURE — 87081 CULTURE SCREEN ONLY: CPT | Performed by: NURSE PRACTITIONER

## 2023-06-19 LAB
25(OH)D3 SERPL-MCNC: 28.9 NG/ML (ref 30–100)
ALBUMIN SERPL-MCNC: 3.9 G/DL (ref 3.5–5.2)
ALBUMIN/GLOB SERPL: 1.3 G/DL
ALP SERPL-CCNC: 69 U/L (ref 39–117)
ALT SERPL W P-5'-P-CCNC: 18 U/L (ref 1–33)
ANION GAP SERPL CALCULATED.3IONS-SCNC: 11 MMOL/L (ref 5–15)
AST SERPL-CCNC: 20 U/L (ref 1–32)
BASOPHILS # BLD AUTO: 0.03 10*3/MM3 (ref 0–0.2)
BASOPHILS NFR BLD AUTO: 0.5 % (ref 0–1.5)
BILIRUB SERPL-MCNC: 0.3 MG/DL (ref 0–1.2)
BUN SERPL-MCNC: 13 MG/DL (ref 6–20)
BUN/CREAT SERPL: 18.1 (ref 7–25)
CALCIUM SPEC-SCNC: 9.4 MG/DL (ref 8.6–10.5)
CHLORIDE SERPL-SCNC: 101 MMOL/L (ref 98–107)
CO2 SERPL-SCNC: 25 MMOL/L (ref 22–29)
CREAT SERPL-MCNC: 0.72 MG/DL (ref 0.57–1)
DEPRECATED RDW RBC AUTO: 40.7 FL (ref 37–54)
EGFRCR SERPLBLD CKD-EPI 2021: 114.8 ML/MIN/1.73
EOSINOPHIL # BLD AUTO: 0.14 10*3/MM3 (ref 0–0.4)
EOSINOPHIL NFR BLD AUTO: 2.3 % (ref 0.3–6.2)
ERYTHROCYTE [DISTWIDTH] IN BLOOD BY AUTOMATED COUNT: 13.7 % (ref 12.3–15.4)
GLOBULIN UR ELPH-MCNC: 3 GM/DL
GLUCOSE SERPL-MCNC: 94 MG/DL (ref 65–99)
HCT VFR BLD AUTO: 38.1 % (ref 34–46.6)
HGB BLD-MCNC: 12.2 G/DL (ref 12–15.9)
IMM GRANULOCYTES # BLD AUTO: 0.02 10*3/MM3 (ref 0–0.05)
IMM GRANULOCYTES NFR BLD AUTO: 0.3 % (ref 0–0.5)
LYMPHOCYTES # BLD AUTO: 1.32 10*3/MM3 (ref 0.7–3.1)
LYMPHOCYTES NFR BLD AUTO: 21.4 % (ref 19.6–45.3)
MCH RBC QN AUTO: 26.3 PG (ref 26.6–33)
MCHC RBC AUTO-ENTMCNC: 32 G/DL (ref 31.5–35.7)
MCV RBC AUTO: 82.1 FL (ref 79–97)
MONOCYTES # BLD AUTO: 0.45 10*3/MM3 (ref 0.1–0.9)
MONOCYTES NFR BLD AUTO: 7.3 % (ref 5–12)
NEUTROPHILS NFR BLD AUTO: 4.21 10*3/MM3 (ref 1.7–7)
NEUTROPHILS NFR BLD AUTO: 68.2 % (ref 42.7–76)
NRBC BLD AUTO-RTO: 0 /100 WBC (ref 0–0.2)
PLATELET # BLD AUTO: 344 10*3/MM3 (ref 140–450)
PMV BLD AUTO: 9.8 FL (ref 6–12)
POTASSIUM SERPL-SCNC: 4.2 MMOL/L (ref 3.5–5.2)
PROT SERPL-MCNC: 6.9 G/DL (ref 6–8.5)
RBC # BLD AUTO: 4.64 10*6/MM3 (ref 3.77–5.28)
SODIUM SERPL-SCNC: 137 MMOL/L (ref 136–145)
TSH SERPL DL<=0.05 MIU/L-ACNC: 0.1 UIU/ML (ref 0.27–4.2)
VIT B12 BLD-MCNC: 535 PG/ML (ref 211–946)
WBC NRBC COR # BLD: 6.17 10*3/MM3 (ref 3.4–10.8)

## 2023-08-14 ENCOUNTER — LAB (OUTPATIENT)
Dept: LAB | Facility: HOSPITAL | Age: 33
End: 2023-08-14
Payer: MEDICAID

## 2023-08-14 ENCOUNTER — OFFICE VISIT (OUTPATIENT)
Dept: ENDOCRINOLOGY | Facility: CLINIC | Age: 33
End: 2023-08-14
Payer: MEDICAID

## 2023-08-14 VITALS
HEART RATE: 76 BPM | DIASTOLIC BLOOD PRESSURE: 80 MMHG | OXYGEN SATURATION: 99 % | WEIGHT: 246.7 LBS | BODY MASS INDEX: 41.1 KG/M2 | SYSTOLIC BLOOD PRESSURE: 120 MMHG | HEIGHT: 65 IN

## 2023-08-14 DIAGNOSIS — E55.9 VITAMIN D DEFICIENCY: ICD-10-CM

## 2023-08-14 DIAGNOSIS — E06.3 HASHIMOTO'S THYROIDITIS: Primary | ICD-10-CM

## 2023-08-14 DIAGNOSIS — D50.8 OTHER IRON DEFICIENCY ANEMIA: ICD-10-CM

## 2023-08-14 LAB
25(OH)D3 SERPL-MCNC: 39.2 NG/ML (ref 30–100)
ALBUMIN SERPL-MCNC: 4 G/DL (ref 3.5–5.2)
ALBUMIN/GLOB SERPL: 1.1 G/DL
ALP SERPL-CCNC: 74 U/L (ref 39–117)
ALT SERPL W P-5'-P-CCNC: 23 U/L (ref 1–33)
ANION GAP SERPL CALCULATED.3IONS-SCNC: 8 MMOL/L (ref 5–15)
AST SERPL-CCNC: 24 U/L (ref 1–32)
BASOPHILS # BLD AUTO: 0.04 10*3/MM3 (ref 0–0.2)
BASOPHILS NFR BLD AUTO: 0.5 % (ref 0–1.5)
BILIRUB SERPL-MCNC: 0.2 MG/DL (ref 0–1.2)
BUN SERPL-MCNC: 16 MG/DL (ref 6–20)
BUN/CREAT SERPL: 19 (ref 7–25)
CALCIUM SPEC-SCNC: 9.3 MG/DL (ref 8.6–10.5)
CHLORIDE SERPL-SCNC: 103 MMOL/L (ref 98–107)
CO2 SERPL-SCNC: 25 MMOL/L (ref 22–29)
CREAT SERPL-MCNC: 0.84 MG/DL (ref 0.57–1)
DEPRECATED RDW RBC AUTO: 40.1 FL (ref 37–54)
EGFRCR SERPLBLD CKD-EPI 2021: 94.8 ML/MIN/1.73
EOSINOPHIL # BLD AUTO: 0.15 10*3/MM3 (ref 0–0.4)
EOSINOPHIL NFR BLD AUTO: 1.7 % (ref 0.3–6.2)
ERYTHROCYTE [DISTWIDTH] IN BLOOD BY AUTOMATED COUNT: 13.3 % (ref 12.3–15.4)
GLOBULIN UR ELPH-MCNC: 3.8 GM/DL
GLUCOSE SERPL-MCNC: 90 MG/DL (ref 65–99)
HCT VFR BLD AUTO: 37.4 % (ref 34–46.6)
HGB BLD-MCNC: 12.1 G/DL (ref 12–15.9)
IMM GRANULOCYTES # BLD AUTO: 0.03 10*3/MM3 (ref 0–0.05)
IMM GRANULOCYTES NFR BLD AUTO: 0.3 % (ref 0–0.5)
IRON 24H UR-MRATE: 96 MCG/DL (ref 37–145)
IRON SATN MFR SERPL: 22 % (ref 20–50)
LYMPHOCYTES # BLD AUTO: 1.8 10*3/MM3 (ref 0.7–3.1)
LYMPHOCYTES NFR BLD AUTO: 20.7 % (ref 19.6–45.3)
MCH RBC QN AUTO: 26.7 PG (ref 26.6–33)
MCHC RBC AUTO-ENTMCNC: 32.4 G/DL (ref 31.5–35.7)
MCV RBC AUTO: 82.4 FL (ref 79–97)
MONOCYTES # BLD AUTO: 0.6 10*3/MM3 (ref 0.1–0.9)
MONOCYTES NFR BLD AUTO: 6.9 % (ref 5–12)
NEUTROPHILS NFR BLD AUTO: 6.08 10*3/MM3 (ref 1.7–7)
NEUTROPHILS NFR BLD AUTO: 69.9 % (ref 42.7–76)
NRBC BLD AUTO-RTO: 0 /100 WBC (ref 0–0.2)
PLATELET # BLD AUTO: 337 10*3/MM3 (ref 140–450)
PMV BLD AUTO: 9.6 FL (ref 6–12)
POTASSIUM SERPL-SCNC: 4.1 MMOL/L (ref 3.5–5.2)
PROT SERPL-MCNC: 7.8 G/DL (ref 6–8.5)
RBC # BLD AUTO: 4.54 10*6/MM3 (ref 3.77–5.28)
SODIUM SERPL-SCNC: 136 MMOL/L (ref 136–145)
TIBC SERPL-MCNC: 444 MCG/DL (ref 298–536)
TRANSFERRIN SERPL-MCNC: 298 MG/DL (ref 200–360)
TSH SERPL DL<=0.05 MIU/L-ACNC: 0.37 UIU/ML (ref 0.27–4.2)
VIT B12 BLD-MCNC: 590 PG/ML (ref 211–946)
WBC NRBC COR # BLD: 8.7 10*3/MM3 (ref 3.4–10.8)

## 2023-08-14 PROCEDURE — 83540 ASSAY OF IRON: CPT | Performed by: NURSE PRACTITIONER

## 2023-08-14 PROCEDURE — 82306 VITAMIN D 25 HYDROXY: CPT | Performed by: NURSE PRACTITIONER

## 2023-08-14 PROCEDURE — 36415 COLL VENOUS BLD VENIPUNCTURE: CPT | Performed by: NURSE PRACTITIONER

## 2023-08-14 PROCEDURE — 1159F MED LIST DOCD IN RCRD: CPT | Performed by: NURSE PRACTITIONER

## 2023-08-14 PROCEDURE — 82607 VITAMIN B-12: CPT | Performed by: NURSE PRACTITIONER

## 2023-08-14 PROCEDURE — 99214 OFFICE O/P EST MOD 30 MIN: CPT | Performed by: NURSE PRACTITIONER

## 2023-08-14 PROCEDURE — 80050 GENERAL HEALTH PANEL: CPT | Performed by: NURSE PRACTITIONER

## 2023-08-14 PROCEDURE — 1160F RVW MEDS BY RX/DR IN RCRD: CPT | Performed by: NURSE PRACTITIONER

## 2023-08-14 PROCEDURE — 84466 ASSAY OF TRANSFERRIN: CPT | Performed by: NURSE PRACTITIONER

## 2023-08-14 NOTE — PROGRESS NOTES
"Chief Complaint  Thyroid   Subjective          Jerrica Marquez presents to [facility] ENDOCRINOLOGY  Hashimoto's Thyroiditis     In office visit         Primary care provider      REBECA Navas     33-year-old female presents for follow-up    Reason hypothyroidism due to Hashimoto's    Duration since 2010    Timing constant    Quality not controlled    Severity is moderate              Vitamin D deficiency    Timing constant    Quality not controlled      Review of Systems - General ROS: positive for  - fatigue            Objective   Vital Signs:   /80   Pulse 76   Ht 165.1 cm (65\")   Wt 112 kg (246 lb 11.2 oz)   SpO2 99%   BMI 41.05 kg/mý     Physical Exam  Constitutional:       Appearance: Normal appearance.   Cardiovascular:      Rate and Rhythm: Regular rhythm.      Heart sounds: Normal heart sounds.   Musculoskeletal:      Cervical back: Normal range of motion.   Neurological:      General: No focal deficit present.      Mental Status: She is alert.   Psychiatric:         Mood and Affect: Mood normal.         Thought Content: Thought content normal.         Judgment: Judgment normal.      Result Review :   The following data was reviewed by: REBECA Blancas on 06/02/2022:  Common labs          9/20/2022    10:00 1/18/2023    12:47 6/19/2023    08:51   Common Labs   Glucose 82  126  94    BUN 9  9  13    Creatinine 0.79  0.73  0.72    Sodium 140  137  137    Potassium 4.0  4.1  4.2    Chloride 103  102  101    Calcium 9.1  8.9  9.4    Albumin 4.20  4.0  3.9    Total Bilirubin 0.3  0.3  0.3    Alkaline Phosphatase 69  62  69    AST (SGOT) 25  19  20    ALT (SGPT) 22  15  18    WBC 6.66  6.02  6.17    Hemoglobin 11.5  11.7  12.2    Hematocrit 35.7  35.6  38.1    Platelets 350  297  344                Assessment and Plan    Diagnoses and all orders for this visit:    1. Hashimoto's thyroiditis (Primary)  -     CBC & Differential  -     Comprehensive " Metabolic Panel  -     TSH  -     Vitamin B12  -     Vitamin D,25-Hydroxy  -     Iron Profile    2. Vitamin D deficiency  -     CBC & Differential  -     Comprehensive Metabolic Panel  -     TSH  -     Vitamin B12  -     Vitamin D,25-Hydroxy  -     Iron Profile    3. Other iron deficiency anemia  -     CBC & Differential  -     Comprehensive Metabolic Panel  -     TSH  -     Vitamin B12  -     Vitamin D,25-Hydroxy  -     Iron Profile             Hypothyroidism due to hashimoto's           Levothyroxine Taking 175 mcg one daily and on Sunday take none           Lab Results   Component Value Date    TSH 0.103 (L) 06/19/2023          Vitamin d def.        Taking MVI daily                B12 nl         Lab Results   Component Value Date    EFRGIJFK22 535 06/19/2023            Iron def. Anemia    Taking  iron daily           Colonoscopy was negative     Follow Up   No follow-ups on file.  Patient was given instructions and counseling regarding her condition or for health maintenance advice. Please see specific information pulled into the AVS if appropriate.         This document has been electronically signed by REBECA Blancas on August 14, 2023 11:06 CDT.

## 2023-08-18 ENCOUNTER — TELEPHONE (OUTPATIENT)
Dept: ENDOCRINOLOGY | Facility: CLINIC | Age: 33
End: 2023-08-18
Payer: MEDICAID

## 2023-08-18 DIAGNOSIS — E06.3 HASHIMOTO'S THYROIDITIS: Primary | ICD-10-CM

## 2023-08-18 RX ORDER — LEVOTHYROXINE SODIUM 0.15 MG/1
150 TABLET ORAL DAILY
Qty: 30 TABLET | Refills: 11 | Status: SHIPPED | OUTPATIENT
Start: 2023-08-18 | End: 2024-08-17

## 2023-08-18 NOTE — TELEPHONE ENCOUNTER
----- Message from REBECA Blancas sent at 8/15/2023 11:24 AM CDT -----  Let her know b12 , iron and vitamin d are normal; thyroid level is normal she wants the 150 called in because she forgets to skip a day at times --take 150 daily

## 2025-02-24 NOTE — PROGRESS NOTES
Birth Control Pills Pregnancy And Lactation Text: This medication should be avoided if pregnant and for the first 30 days post-partum. CC:  visit, hx reviewed    Patient Active Problem List   Diagnosis   • Hashimoto's thyroiditis   • Class 3 severe obesity with body mass index (BMI) of 40.0 to 44.9 in adult (CMS/[facility])       HPI: Here for follow up prenatal care.     ROS: Pt reports she is taking labetalol at home and BP has been staying 130/60, but feels the medication makes her nauseated at times.  Pt denies regular n/v, cramping, dysuria, or vaginal bleeding.      Labs:   No results found for: HGBA1C  Glucose   Date Value Ref Range Status   2019 104 (H) 60 - 100 mg/dL Final   2018 83 60 - 100 mg/dL Final   2018 94 60 - 100 mg/dL Final     Last Completed Pap Smear       Status Date      PAP SMEAR Done 2018 LIQUID-BASED PAP SMEAR, SCREENING     Patient has more history with this topic...          Radiology: v scan utilized     Each of the above were reviewed and integrated into prenatal care plan.    P/E:  See Vitals flow sheet, rechecked BP manually-130/78 in left arm sitting    A/P: 29 y.o. #: 1, Date: 17, Sex: Female, Weight: 3515 g (7 lb 12 oz), GA: 37w2d, Delivery: , Low Transverse, Apgar1: 7, Apgar5: 9, Living: Living, Birth Comments: None    #: 2, Date: None, Sex: Female, Weight: None, GA: 13w4d      1. Routine prenatal care   Encourage PNV   SAB precautions   Pt signed release of information because we never received dating scan report from [facility]      2.    Diagnosis Plan   1. 13 weeks gestation of pregnancy     2. Maternal obesity, antepartum, first trimester     3. Chronic hypertension in obstetric context in first trimester  Taking labetalol once daily   4. History of pre-eclampsia in prior pregnancy, currently pregnant in first trimester  Baseline labs obtained   5. Hypothyroid in pregnancy, antepartum, first trimester  Continue levothyroxine   6. History of  delivery, currently pregnant  Desires RCS    7. Nausea and vomiting during pregnancy prior to 22 weeks gestation  Resolved      RTC in 1 month for OB appt or sooner if needed   Topical Sulfur Applications Counseling: Topical Sulfur Counseling: Patient counseled that this medication may cause skin irritation or allergic reactions.  In the event of skin irritation, the patient was advised to reduce the amount of the drug applied or use it less frequently.   The patient verbalized understanding of the proper use and possible adverse effects of topical sulfur application.  All of the patient's questions and concerns were addressed. Birth Control Pills Counseling: Birth Control Pill Counseling: I discussed with the patient the potential side effects of OCPs including but not limited to increased risk of stroke, heart attack, thrombophlebitis, deep venous thrombosis, hepatic adenomas, breast changes, GI upset, headaches, and depression.  The patient verbalized understanding of the proper use and possible adverse effects of OCPs. All of the patient's questions and concerns were addressed. Erythromycin Counseling:  I discussed with the patient the risks of erythromycin including but not limited to GI upset, allergic reaction, drug rash, diarrhea, increase in liver enzymes, and yeast infections. Minocycline Counseling: Patient advised regarding possible photosensitivity and discoloration of the teeth, skin, lips, tongue and gums.  Patient instructed to avoid sunlight, if possible.  When exposed to sunlight, patients should wear protective clothing, sunglasses, and sunscreen.  The patient was instructed to call the office immediately if the following severe adverse effects occur:  hearing changes, easy bruising/bleeding, severe headache, or vision changes.  The patient verbalized understanding of the proper use and possible adverse effects of minocycline.  All of the patient's questions and concerns were addressed. Winlevi Pregnancy And Lactation Text: This medication is considered safe during pregnancy and breastfeeding. Azelaic Acid Counseling: Patient counseled that medicine may cause skin irritation and to avoid applying near the eyes.  In the event of skin irritation, the patient was advised to reduce the amount of the drug applied or use it less frequently.   The patient verbalized understanding of the proper use and possible adverse effects of azelaic acid.  All of the patient's questions and concerns were addressed. Topical Retinoid counseling:  Patient advised to apply a pea-sized amount only at bedtime and wait 30 minutes after washing their face before applying.  If too drying, patient may add a non-comedogenic moisturizer. The patient verbalized understanding of the proper use and possible adverse effects of retinoids.  All of the patient's questions and concerns were addressed. Use Enhanced Medication Counseling?: No Winlevi Counseling:  I discussed with the patient the risks of topical clascoterone including but not limited to erythema, scaling, itching, and stinging. Patient voiced their understanding. Erythromycin Pregnancy And Lactation Text: This medication is Pregnancy Category B and is considered safe during pregnancy. It is also excreted in breast milk. Topical Retinoid Pregnancy And Lactation Text: This medication is Pregnancy Category C. It is unknown if this medication is excreted in breast milk. Benzoyl Peroxide Counseling: Patient counseled that medicine may cause skin irritation and bleach clothing.  In the event of skin irritation, the patient was advised to reduce the amount of the drug applied or use it less frequently.   The patient verbalized understanding of the proper use and possible adverse effects of benzoyl peroxide.  All of the patient's questions and concerns were addressed. Dapsone Pregnancy And Lactation Text: This medication is Pregnancy Category C and is not considered safe during pregnancy or breast feeding. Sarecycline Counseling: Patient advised regarding possible photosensitivity and discoloration of the teeth, skin, lips, tongue and gums.  Patient instructed to avoid sunlight, if possible.  When exposed to sunlight, patients should wear protective clothing, sunglasses, and sunscreen.  The patient was instructed to call the office immediately if the following severe adverse effects occur:  hearing changes, easy bruising/bleeding, severe headache, or vision changes.  The patient verbalized understanding of the proper use and possible adverse effects of sarecycline.  All of the patient's questions and concerns were addressed. Tazorac Pregnancy And Lactation Text: This medication is not safe during pregnancy. It is unknown if this medication is excreted in breast milk. Sarecycline Pregnancy And Lactation Text: This medication is Pregnancy Category D and not consider safe during pregnancy. It is also excreted in breast milk. Tetracycline Counseling: Patient counseled regarding possible photosensitivity and increased risk for sunburn.  Patient instructed to avoid sunlight, if possible.  When exposed to sunlight, patients should wear protective clothing, sunglasses, and sunscreen.  The patient was instructed to call the office immediately if the following severe adverse effects occur:  hearing changes, easy bruising/bleeding, severe headache, or vision changes.  The patient verbalized understanding of the proper use and possible adverse effects of tetracycline.  All of the patient's questions and concerns were addressed. Patient understands to avoid pregnancy while on therapy due to potential birth defects. Benzoyl Peroxide Pregnancy And Lactation Text: This medication is Pregnancy Category C. It is unknown if benzoyl peroxide is excreted in breast milk. High Dose Vitamin A Counseling: Side effects reviewed, pt to contact office should one occur. Isotretinoin Counseling: Patient should get monthly blood tests, not donate blood, not drive at night if vision affected, not share medication, and not undergo elective surgery for 6 months after tx completed. Side effects reviewed, pt to contact office should one occur. Azithromycin Pregnancy And Lactation Text: This medication is considered safe during pregnancy and is also secreted in breast milk. Doxycycline Counseling:  Patient counseled regarding possible photosensitivity and increased risk for sunburn.  Patient instructed to avoid sunlight, if possible.  When exposed to sunlight, patients should wear protective clothing, sunglasses, and sunscreen.  The patient was instructed to call the office immediately if the following severe adverse effects occur:  hearing changes, easy bruising/bleeding, severe headache, or vision changes.  The patient verbalized understanding of the proper use and possible adverse effects of doxycycline.  All of the patient's questions and concerns were addressed. Azithromycin Counseling:  I discussed with the patient the risks of azithromycin including but not limited to GI upset, allergic reaction, drug rash, diarrhea, and yeast infections. Doxycycline Pregnancy And Lactation Text: This medication is Pregnancy Category D and not consider safe during pregnancy. It is also excreted in breast milk but is considered safe for shorter treatment courses. Dapsone Counseling: I discussed with the patient the risks of dapsone including but not limited to hemolytic anemia, agranulocytosis, rashes, methemoglobinemia, kidney failure, peripheral neuropathy, headaches, GI upset, and liver toxicity.  Patients who start dapsone require monitoring including baseline LFTs and weekly CBCs for the first month, then every month thereafter.  The patient verbalized understanding of the proper use and possible adverse effects of dapsone.  All of the patient's questions and concerns were addressed. Topical Sulfur Applications Pregnancy And Lactation Text: This medication is Pregnancy Category C and has an unknown safety profile during pregnancy. It is unknown if this topical medication is excreted in breast milk. Topical Clindamycin Pregnancy And Lactation Text: This medication is Pregnancy Category B and is considered safe during pregnancy. It is unknown if it is excreted in breast milk. Tazorac Counseling:  Patient advised that medication is irritating and drying.  Patient may need to apply sparingly and wash off after an hour before eventually leaving it on overnight.  The patient verbalized understanding of the proper use and possible adverse effects of tazorac.  All of the patient's questions and concerns were addressed. Spironolactone Counseling: Patient advised regarding risks of diarrhea, abdominal pain, hyperkalemia, birth defects (for female patients), liver toxicity and renal toxicity. The patient may need blood work to monitor liver and kidney function and potassium levels while on therapy. The patient verbalized understanding of the proper use and possible adverse effects of spironolactone.  All of the patient's questions and concerns were addressed. Spironolactone Pregnancy And Lactation Text: This medication can cause feminization of the male fetus and should be avoided during pregnancy. The active metabolite is also found in breast milk. Bactrim Pregnancy And Lactation Text: This medication is Pregnancy Category D and is known to cause fetal risk.  It is also excreted in breast milk. High Dose Vitamin A Pregnancy And Lactation Text: High dose vitamin A therapy is contraindicated during pregnancy and breast feeding. Aklief Pregnancy And Lactation Text: It is unknown if this medication is safe to use during pregnancy.  It is unknown if this medication is excreted in breast milk.  Breastfeeding women should use the topical cream on the smallest area of the skin for the shortest time needed while breastfeeding.  Do not apply to nipple and areola. Bactrim Counseling:  I discussed with the patient the risks of sulfa antibiotics including but not limited to GI upset, allergic reaction, drug rash, diarrhea, dizziness, photosensitivity, and yeast infections.  Rarely, more serious reactions can occur including but not limited to aplastic anemia, agranulocytosis, methemoglobinemia, blood dyscrasias, liver or kidney failure, lung infiltrates or desquamative/blistering drug rashes. Detail Level: Zone Aklief counseling:  Patient advised to apply a pea-sized amount only at bedtime and wait 30 minutes after washing their face before applying.  If too drying, patient may add a non-comedogenic moisturizer.  The most commonly reported side effects including irritation, redness, scaling, dryness, stinging, burning, itching, and increased risk of sunburn.  The patient verbalized understanding of the proper use and possible adverse effects of retinoids.  All of the patient's questions and concerns were addressed. Topical Clindamycin Counseling: Patient counseled that this medication may cause skin irritation or allergic reactions.  In the event of skin irritation, the patient was advised to reduce the amount of the drug applied or use it less frequently.   The patient verbalized understanding of the proper use and possible adverse effects of clindamycin.  All of the patient's questions and concerns were addressed. Azelaic Acid Pregnancy And Lactation Text: This medication is considered safe during pregnancy and breast feeding. Isotretinoin Pregnancy And Lactation Text: This medication is Pregnancy Category X and is considered extremely dangerous during pregnancy. It is unknown if it is excreted in breast milk. Detail Level: Detailed

## (undated) DEVICE — GARMENT,MEDLINE,DVT,INT,CALF,MED, GEN2: Brand: MEDLINE

## (undated) DEVICE — SUT MNCRYL 3/0 Y936H

## (undated) DEVICE — PAD,ABDOMINAL,8"X10",ST,LF: Brand: MEDLINE

## (undated) DEVICE — GLV SURG SENSICARE W/ALOE PF LF 8.5 STRL

## (undated) DEVICE — SYS SKIN CLS DERMABOND PRINEO W/22CM MESH TP

## (undated) DEVICE — SUT VIC 0 CTX 36IN J978H

## (undated) DEVICE — SUT PDS LP 0 CTX VIO 60IN Z990G

## (undated) DEVICE — SUT  GUT PLAIN 2/0 CT1 27IN 843H

## (undated) DEVICE — STERILE POLYISOPRENE POWDER-FREE SURGICAL GLOVES WITH EMOLLIENT COATING: Brand: PROTEXIS

## (undated) DEVICE — GOWN,PREVENTION PLUS,XLNG/XXLARGE,STRL: Brand: MEDLINE

## (undated) DEVICE — SUT VIC 3/0 CTI 36IN J944H

## (undated) DEVICE — SUT GUT CHRM 1 CTX 36IN 905H

## (undated) DEVICE — PK C/SECT 60

## (undated) DEVICE — TRY SPINE PENCAN 24GA X4IN

## (undated) DEVICE — POOLE SUCTION INSTRUMENT WITH REMOVABLE SHEATH: Brand: POOLE

## (undated) DEVICE — DRSNG WND BORDR/ADHS NONADHR/GZ LF 4X10IN STRL

## (undated) DEVICE — GLV SURG TRIUMPH LT PF LTX 8 STRL

## (undated) DEVICE — INTENDED FOR TISSUE SEPARATION, AND OTHER PROCEDURES THAT REQUIRE A SHARP SURGICAL BLADE TO PUNCTURE OR CUT.: Brand: BARD-PARKER ® STAINLESS STEEL BLADES

## (undated) DEVICE — SUT VIC 0 CT 36IN J958H

## (undated) DEVICE — GLV SURG SENSICARE GREEN W/ALOE PF LF 6.5 STRL